# Patient Record
Sex: MALE | Race: WHITE | Employment: OTHER | URBAN - METROPOLITAN AREA
[De-identification: names, ages, dates, MRNs, and addresses within clinical notes are randomized per-mention and may not be internally consistent; named-entity substitution may affect disease eponyms.]

---

## 2020-12-09 ENCOUNTER — APPOINTMENT (OUTPATIENT)
Dept: CT IMAGING | Age: 75
DRG: 025 | End: 2020-12-09
Payer: MEDICARE

## 2020-12-09 ENCOUNTER — APPOINTMENT (OUTPATIENT)
Dept: MRI IMAGING | Age: 75
DRG: 025 | End: 2020-12-09
Payer: MEDICARE

## 2020-12-09 ENCOUNTER — HOSPITAL ENCOUNTER (INPATIENT)
Age: 75
LOS: 19 days | Discharge: NURSING, REHAB OR LONG-TERM CARE | DRG: 025 | End: 2020-12-28
Attending: EMERGENCY MEDICINE | Admitting: PSYCHIATRY & NEUROLOGY
Payer: MEDICARE

## 2020-12-09 ENCOUNTER — APPOINTMENT (OUTPATIENT)
Dept: GENERAL RADIOLOGY | Age: 75
DRG: 025 | End: 2020-12-09
Payer: MEDICARE

## 2020-12-09 PROBLEM — G93.89 BRAIN MASS: Status: ACTIVE | Noted: 2020-12-09

## 2020-12-09 LAB
% CKMB: 5.8 % (ref 0–3.5)
-: ABNORMAL
ABSOLUTE EOS #: <0.03 K/UL (ref 0–0.44)
ABSOLUTE IMMATURE GRANULOCYTE: <0.03 K/UL (ref 0–0.3)
ABSOLUTE LYMPH #: 0.98 K/UL (ref 1.1–3.7)
ABSOLUTE MONO #: 0.39 K/UL (ref 0.1–1.2)
ALBUMIN SERPL-MCNC: 3.9 G/DL (ref 3.5–5.2)
ALBUMIN/GLOBULIN RATIO: 1.3 (ref 1–2.5)
ALLEN TEST: ABNORMAL
ALP BLD-CCNC: 48 U/L (ref 40–129)
ALT SERPL-CCNC: 28 U/L (ref 5–41)
AMMONIA: 15 UMOL/L (ref 16–60)
AMORPHOUS: ABNORMAL
ANION GAP SERPL CALCULATED.3IONS-SCNC: 10 MMOL/L (ref 9–17)
ANION GAP: 10 MMOL/L (ref 7–16)
AST SERPL-CCNC: 23 U/L
BACTERIA: ABNORMAL
BASOPHILS # BLD: 0 % (ref 0–2)
BASOPHILS ABSOLUTE: <0.03 K/UL (ref 0–0.2)
BILIRUB SERPL-MCNC: 0.64 MG/DL (ref 0.3–1.2)
BILIRUBIN DIRECT: 0.18 MG/DL
BILIRUBIN URINE: NEGATIVE
BILIRUBIN, INDIRECT: 0.46 MG/DL (ref 0–1)
BUN BLDV-MCNC: 19 MG/DL (ref 8–23)
BUN/CREAT BLD: ABNORMAL (ref 9–20)
CALCIUM SERPL-MCNC: 9.5 MG/DL (ref 8.6–10.4)
CASTS UA: ABNORMAL /LPF (ref 0–8)
CHLORIDE BLD-SCNC: 98 MMOL/L (ref 98–107)
CHOLESTEROL, FASTING: 110 MG/DL
CHOLESTEROL/HDL RATIO: 3
CK MB: 9.7 NG/ML
CKMB INTERPRETATION: ABNORMAL
CO2: 25 MMOL/L (ref 20–31)
COLOR: YELLOW
CREAT SERPL-MCNC: 0.93 MG/DL (ref 0.7–1.2)
CRYSTALS, UA: ABNORMAL /HPF
DIFFERENTIAL TYPE: ABNORMAL
EOSINOPHILS RELATIVE PERCENT: 0 % (ref 1–4)
EPITHELIAL CELLS UA: ABNORMAL /HPF (ref 0–5)
FIO2: ABNORMAL
FOLATE: >20 NG/ML
GFR AFRICAN AMERICAN: >60 ML/MIN
GFR NON-AFRICAN AMERICAN: 59 ML/MIN
GFR NON-AFRICAN AMERICAN: >60 ML/MIN
GFR SERPL CREATININE-BSD FRML MDRD: >60 ML/MIN
GFR SERPL CREATININE-BSD FRML MDRD: ABNORMAL ML/MIN/{1.73_M2}
GLOBULIN: NORMAL G/DL (ref 1.5–3.8)
GLUCOSE BLD-MCNC: 113 MG/DL (ref 70–99)
GLUCOSE BLD-MCNC: 119 MG/DL (ref 74–100)
GLUCOSE URINE: NEGATIVE
HCO3 VENOUS: 29.8 MMOL/L (ref 22–29)
HCT VFR BLD CALC: 50.5 % (ref 40.7–50.3)
HDLC SERPL-MCNC: 37 MG/DL
HEMOGLOBIN: 16.8 G/DL (ref 13–17)
IMMATURE GRANULOCYTES: 0 %
INR BLD: 1
KETONES, URINE: ABNORMAL
LDL CHOLESTEROL: 52 MG/DL (ref 0–130)
LEUKOCYTE ESTERASE, URINE: NEGATIVE
LYMPHOCYTES # BLD: 18 % (ref 24–43)
MCH RBC QN AUTO: 31.1 PG (ref 25.2–33.5)
MCHC RBC AUTO-ENTMCNC: 33.3 G/DL (ref 28.4–34.8)
MCV RBC AUTO: 93.5 FL (ref 82.6–102.9)
MODE: ABNORMAL
MONOCYTES # BLD: 7 % (ref 3–12)
MUCUS: ABNORMAL
MYOGLOBIN: 111 NG/ML (ref 28–72)
NEGATIVE BASE EXCESS, VEN: ABNORMAL (ref 0–2)
NITRITE, URINE: NEGATIVE
NRBC AUTOMATED: 0 PER 100 WBC
O2 DEVICE/FLOW/%: ABNORMAL
O2 SAT, VEN: 54 % (ref 60–85)
OTHER OBSERVATIONS UA: ABNORMAL
PARTIAL THROMBOPLASTIN TIME: 25.4 SEC (ref 20.5–30.5)
PATIENT TEMP: ABNORMAL
PCO2, VEN: 48.6 MM HG (ref 41–51)
PDW BLD-RTO: 13 % (ref 11.8–14.4)
PH UA: 7.5 (ref 5–8)
PH VENOUS: 7.4 (ref 7.32–7.43)
PLATELET # BLD: 180 K/UL (ref 138–453)
PLATELET ESTIMATE: ABNORMAL
PMV BLD AUTO: 10.2 FL (ref 8.1–13.5)
PO2, VEN: 28.8 MM HG (ref 30–50)
POC CHLORIDE: 97 MMOL/L (ref 98–107)
POC CREATININE: 1.19 MG/DL (ref 0.51–1.19)
POC HEMATOCRIT: 50 % (ref 41–53)
POC HEMOGLOBIN: 16.9 G/DL (ref 13.5–17.5)
POC IONIZED CALCIUM: 1.18 MMOL/L (ref 1.15–1.33)
POC LACTIC ACID: 1.35 MMOL/L (ref 0.56–1.39)
POC PCO2 TEMP: ABNORMAL MM HG
POC PH TEMP: ABNORMAL
POC PO2 TEMP: ABNORMAL MM HG
POC POTASSIUM: 4.2 MMOL/L (ref 3.5–4.5)
POC SODIUM: 137 MMOL/L (ref 138–146)
POSITIVE BASE EXCESS, VEN: 4 (ref 0–3)
POTASSIUM SERPL-SCNC: 4.1 MMOL/L (ref 3.7–5.3)
PROTEIN UA: NEGATIVE
PROTHROMBIN TIME: 10.7 SEC (ref 9–12)
RBC # BLD: 5.4 M/UL (ref 4.21–5.77)
RBC # BLD: ABNORMAL 10*6/UL
RBC UA: ABNORMAL /HPF (ref 0–4)
RENAL EPITHELIAL, UA: ABNORMAL /HPF
SAMPLE SITE: ABNORMAL
SARS-COV-2, RAPID: NOT DETECTED
SARS-COV-2: NORMAL
SARS-COV-2: NORMAL
SEG NEUTROPHILS: 75 % (ref 36–65)
SEGMENTED NEUTROPHILS ABSOLUTE COUNT: 4.18 K/UL (ref 1.5–8.1)
SODIUM BLD-SCNC: 133 MMOL/L (ref 135–144)
SOURCE: NORMAL
SPECIFIC GRAVITY UA: 1.06 (ref 1–1.03)
TOTAL CK: 168 U/L (ref 39–308)
TOTAL CO2, VENOUS: 31 MMOL/L (ref 23–30)
TOTAL PROTEIN: 6.9 G/DL (ref 6.4–8.3)
TRICHOMONAS: ABNORMAL
TRIGLYCERIDE, FASTING: 107 MG/DL
TROPONIN INTERP: ABNORMAL
TROPONIN INTERP: ABNORMAL
TROPONIN T: ABNORMAL NG/ML
TROPONIN T: ABNORMAL NG/ML
TROPONIN, HIGH SENSITIVITY: 31 NG/L (ref 0–22)
TROPONIN, HIGH SENSITIVITY: 34 NG/L (ref 0–22)
TSH SERPL DL<=0.05 MIU/L-ACNC: 2.76 MIU/L (ref 0.3–5)
TURBIDITY: CLEAR
URINE HGB: NEGATIVE
UROBILINOGEN, URINE: NORMAL
VITAMIN B-12: 1113 PG/ML (ref 232–1245)
VLDLC SERPL CALC-MCNC: ABNORMAL MG/DL (ref 1–30)
WBC # BLD: 5.6 K/UL (ref 3.5–11.3)
WBC # BLD: ABNORMAL 10*3/UL
WBC UA: ABNORMAL /HPF (ref 0–5)
YEAST: ABNORMAL

## 2020-12-09 PROCEDURE — 74176 CT ABD & PELVIS W/O CONTRAST: CPT

## 2020-12-09 PROCEDURE — 82435 ASSAY OF BLOOD CHLORIDE: CPT

## 2020-12-09 PROCEDURE — 84132 ASSAY OF SERUM POTASSIUM: CPT

## 2020-12-09 PROCEDURE — 84443 ASSAY THYROID STIM HORMONE: CPT

## 2020-12-09 PROCEDURE — 85730 THROMBOPLASTIN TIME PARTIAL: CPT

## 2020-12-09 PROCEDURE — 6370000000 HC RX 637 (ALT 250 FOR IP): Performed by: PSYCHIATRY & NEUROLOGY

## 2020-12-09 PROCEDURE — 82746 ASSAY OF FOLIC ACID SERUM: CPT

## 2020-12-09 PROCEDURE — 6360000004 HC RX CONTRAST MEDICATION: Performed by: EMERGENCY MEDICINE

## 2020-12-09 PROCEDURE — 85610 PROTHROMBIN TIME: CPT

## 2020-12-09 PROCEDURE — 3209999900 CT THORACIC SPINE TRAUMA RECONSTRUCTION

## 2020-12-09 PROCEDURE — 96366 THER/PROPH/DIAG IV INF ADDON: CPT

## 2020-12-09 PROCEDURE — 81001 URINALYSIS AUTO W/SCOPE: CPT

## 2020-12-09 PROCEDURE — 99285 EMERGENCY DEPT VISIT HI MDM: CPT

## 2020-12-09 PROCEDURE — 2000000003 HC NEURO ICU R&B

## 2020-12-09 PROCEDURE — 6370000000 HC RX 637 (ALT 250 FOR IP): Performed by: STUDENT IN AN ORGANIZED HEALTH CARE EDUCATION/TRAINING PROGRAM

## 2020-12-09 PROCEDURE — 0042T CT BRAIN PERFUSION: CPT

## 2020-12-09 PROCEDURE — 87086 URINE CULTURE/COLONY COUNT: CPT

## 2020-12-09 PROCEDURE — 6360000004 HC RX CONTRAST MEDICATION: Performed by: PSYCHIATRY & NEUROLOGY

## 2020-12-09 PROCEDURE — 83605 ASSAY OF LACTIC ACID: CPT

## 2020-12-09 PROCEDURE — 70496 CT ANGIOGRAPHY HEAD: CPT

## 2020-12-09 PROCEDURE — 80048 BASIC METABOLIC PNL TOTAL CA: CPT

## 2020-12-09 PROCEDURE — 6360000004 HC RX CONTRAST MEDICATION: Performed by: FAMILY MEDICINE

## 2020-12-09 PROCEDURE — 80076 HEPATIC FUNCTION PANEL: CPT

## 2020-12-09 PROCEDURE — 85014 HEMATOCRIT: CPT

## 2020-12-09 PROCEDURE — 2500000003 HC RX 250 WO HCPCS: Performed by: STUDENT IN AN ORGANIZED HEALTH CARE EDUCATION/TRAINING PROGRAM

## 2020-12-09 PROCEDURE — 85025 COMPLETE CBC W/AUTO DIFF WBC: CPT

## 2020-12-09 PROCEDURE — 70450 CT HEAD/BRAIN W/O DYE: CPT

## 2020-12-09 PROCEDURE — 70553 MRI BRAIN STEM W/O & W/DYE: CPT

## 2020-12-09 PROCEDURE — 2580000003 HC RX 258: Performed by: STUDENT IN AN ORGANIZED HEALTH CARE EDUCATION/TRAINING PROGRAM

## 2020-12-09 PROCEDURE — 84484 ASSAY OF TROPONIN QUANT: CPT

## 2020-12-09 PROCEDURE — A9579 GAD-BASE MR CONTRAST NOS,1ML: HCPCS | Performed by: FAMILY MEDICINE

## 2020-12-09 PROCEDURE — 83874 ASSAY OF MYOGLOBIN: CPT

## 2020-12-09 PROCEDURE — 82550 ASSAY OF CK (CPK): CPT

## 2020-12-09 PROCEDURE — 82330 ASSAY OF CALCIUM: CPT

## 2020-12-09 PROCEDURE — 6360000002 HC RX W HCPCS: Performed by: FAMILY MEDICINE

## 2020-12-09 PROCEDURE — 99291 CRITICAL CARE FIRST HOUR: CPT

## 2020-12-09 PROCEDURE — U0002 COVID-19 LAB TEST NON-CDC: HCPCS

## 2020-12-09 PROCEDURE — 36415 COLL VENOUS BLD VENIPUNCTURE: CPT

## 2020-12-09 PROCEDURE — 82140 ASSAY OF AMMONIA: CPT

## 2020-12-09 PROCEDURE — 82803 BLOOD GASES ANY COMBINATION: CPT

## 2020-12-09 PROCEDURE — 82607 VITAMIN B-12: CPT

## 2020-12-09 PROCEDURE — 2580000003 HC RX 258: Performed by: FAMILY MEDICINE

## 2020-12-09 PROCEDURE — 95819 EEG AWAKE AND ASLEEP: CPT

## 2020-12-09 PROCEDURE — 71045 X-RAY EXAM CHEST 1 VIEW: CPT

## 2020-12-09 PROCEDURE — 82553 CREATINE MB FRACTION: CPT

## 2020-12-09 PROCEDURE — 96365 THER/PROPH/DIAG IV INF INIT: CPT

## 2020-12-09 PROCEDURE — 3209999900 CT LUMBAR SPINE TRAUMA RECONSTRUCTION

## 2020-12-09 PROCEDURE — 82565 ASSAY OF CREATININE: CPT

## 2020-12-09 PROCEDURE — 84295 ASSAY OF SERUM SODIUM: CPT

## 2020-12-09 PROCEDURE — 82947 ASSAY GLUCOSE BLOOD QUANT: CPT

## 2020-12-09 PROCEDURE — 72125 CT NECK SPINE W/O DYE: CPT

## 2020-12-09 PROCEDURE — 80061 LIPID PANEL: CPT

## 2020-12-09 PROCEDURE — 99222 1ST HOSP IP/OBS MODERATE 55: CPT | Performed by: PSYCHIATRY & NEUROLOGY

## 2020-12-09 PROCEDURE — 93005 ELECTROCARDIOGRAM TRACING: CPT | Performed by: EMERGENCY MEDICINE

## 2020-12-09 RX ORDER — ATORVASTATIN CALCIUM 40 MG/1
40 TABLET, FILM COATED ORAL NIGHTLY
Status: DISCONTINUED | OUTPATIENT
Start: 2020-12-09 | End: 2020-12-28 | Stop reason: HOSPADM

## 2020-12-09 RX ORDER — ASPIRIN 300 MG/1
300 SUPPOSITORY RECTAL ONCE
Status: COMPLETED | OUTPATIENT
Start: 2020-12-09 | End: 2020-12-09

## 2020-12-09 RX ORDER — LEVETIRACETAM 10 MG/ML
1000 INJECTION INTRAVASCULAR ONCE
Status: COMPLETED | OUTPATIENT
Start: 2020-12-09 | End: 2020-12-09

## 2020-12-09 RX ORDER — LEVETIRACETAM 5 MG/ML
500 INJECTION INTRAVASCULAR EVERY 12 HOURS
Status: DISCONTINUED | OUTPATIENT
Start: 2020-12-09 | End: 2020-12-16

## 2020-12-09 RX ORDER — SODIUM CHLORIDE 0.9 % (FLUSH) 0.9 %
10 SYRINGE (ML) INJECTION PRN
Status: DISCONTINUED | OUTPATIENT
Start: 2020-12-09 | End: 2020-12-28 | Stop reason: HOSPADM

## 2020-12-09 RX ORDER — SODIUM CHLORIDE 9 MG/ML
INJECTION, SOLUTION INTRAVENOUS CONTINUOUS
Status: DISCONTINUED | OUTPATIENT
Start: 2020-12-09 | End: 2020-12-16

## 2020-12-09 RX ORDER — LABETALOL HYDROCHLORIDE 5 MG/ML
10 INJECTION, SOLUTION INTRAVENOUS EVERY 10 MIN PRN
Status: DISCONTINUED | OUTPATIENT
Start: 2020-12-09 | End: 2020-12-14

## 2020-12-09 RX ADMIN — IOPAMIDOL 50 ML: 755 INJECTION, SOLUTION INTRAVENOUS at 15:20

## 2020-12-09 RX ADMIN — LEVETIRACETAM 1000 MG: 10 INJECTION INTRAVENOUS at 14:04

## 2020-12-09 RX ADMIN — GADOTERIDOL 18 ML: 279.3 INJECTION, SOLUTION INTRAVENOUS at 18:38

## 2020-12-09 RX ADMIN — IOPAMIDOL 90 ML: 755 INJECTION, SOLUTION INTRAVENOUS at 13:47

## 2020-12-09 RX ADMIN — Medication 10 ML: at 20:49

## 2020-12-09 RX ADMIN — SODIUM CHLORIDE: 9 INJECTION, SOLUTION INTRAVENOUS at 20:51

## 2020-12-09 RX ADMIN — FAMOTIDINE 20 MG: 10 INJECTION INTRAVENOUS at 20:49

## 2020-12-09 RX ADMIN — DESMOPRESSIN ACETATE 40 MG: 0.2 TABLET ORAL at 20:49

## 2020-12-09 RX ADMIN — ASPIRIN 300 MG: 300 SUPPOSITORY RECTAL at 14:05

## 2020-12-09 ASSESSMENT — PAIN SCALES - WONG BAKER: WONGBAKER_NUMERICALRESPONSE: 2

## 2020-12-09 ASSESSMENT — PAIN SCALES - GENERAL
PAINLEVEL_OUTOF10: 0
PAINLEVEL_OUTOF10: 0

## 2020-12-09 NOTE — CONSULTS
Department of Endovascular Neurosurgery                                         Resident Consult Note    Reason for Consult: unresponsiveness,fall,confusion,dysarthria  Requesting Physician:  Dr. Tiffany Pruett:   []Dr. April Medrano  [x]Dr. Leesa Foss  []Dr. Sharan Collet  []Dr. Rajat Duarte  []     History Obtained From:  family member - daughter, electronic medical record, staff    CHIEF COMPLAINT:        unresponsiveness,fall,confusion,dysarthria    HISTORY OF PRESENT ILLNESS:       The patient is a 66 y.o. male who presents with  Unresponsiveness,fall,confusion,dysarthria. According to the daughter patient posted via post on [employer] that was not making any sense and felt like it was not like his usual self. This morning patient called a friend telling them he had fallen and was noticed to have slurred speech. EMS was called and patient was brought to the hospital.  Per daughter patient did not have any significant past medical history  Not on any AC/AP at home  No known  history of stroke versus seizure versus tumor in the past  GCS E4, V1, M1  Patient was blinking his eyes in the ER and was noted to move right upper extremity. Patient was not responding, no gaze deviation was noted patient was also noticed to have left facial droop. BP in the   Glucose 119  Creatinine 1.19    nih 22     PAST MEDICAL HISTORY :       Past Medical History:    History reviewed. No pertinent past medical history. Past Surgical History:    History reviewed. No pertinent surgical history.     Social History:   Social History     Socioeconomic History    Marital status: Not on file     Spouse name: Not on file    Number of children: Not on file    Years of education: Not on file    Highest education level: Not on file   Occupational History    Not on file   Social Needs    Financial resource strain: Not on file    Food insecurity     Worry: Not on file     Inability: Not on file    Transportation needs     Medical: Not on file     Non-medical: Not on file   Tobacco Use    Smoking status: Unknown If Ever Smoked   Substance and Sexual Activity    Alcohol use: Not on file     Comment: SAUNDRA     Drug use: Not on file    Sexual activity: Not on file     Comment: SAUNDRA   Lifestyle    Physical activity     Days per week: Not on file     Minutes per session: Not on file    Stress: Not on file   Relationships    Social connections     Talks on phone: Not on file     Gets together: Not on file     Attends Hinduism service: Not on file     Active member of club or organization: Not on file     Attends meetings of clubs or organizations: Not on file     Relationship status: Not on file    Intimate partner violence     Fear of current or ex partner: Not on file     Emotionally abused: Not on file     Physically abused: Not on file     Forced sexual activity: Not on file   Other Topics Concern    Not on file   Social History Narrative    Not on file       Family History:   History reviewed. No pertinent family history. Allergies:  Patient has no allergy information on record. Home Medications:  Prior to Admission medications    Not on File       Current Medications:   No current facility-administered medications for this encounter. REVIEW OF SYSTEMS:       CONSTITUTIONAL: negative for fatigue and malaise   EYES: negative for double vision and photophobia    HEENT: negative for tinnitus and sore throat   RESPIRATORY: negative for cough, shortness of breath   CARDIOVASCULAR: negative for chest pain, palpitations   GASTROINTESTINAL: negative for nausea, vomiting   GENITOURINARY: negative for incontinence   MUSCULOSKELETAL: negative for neck or back pain   NEUROLOGICAL: negative for seizures   PSYCHIATRIC: negative for fatigue     Review of systems otherwise negative.     PHYSICAL EXAM:       BP (!) 181/96   Pulse 84   Temp 98.6 °F (37 °C) (Axillary)   Resp 18   Ht 6' 2\" (1.88 m) Wt 200 lb (90.7 kg)   SpO2 100%   BMI 25.68 kg/m²     CONSTITUTIONAL:  Well developed, well nourished, GCS E4,V1,M1   Blinking eyes, was moving right upper extremity, not responding, no murmur upon noxious stimulation in the extremities, no gaze deviation pupils equal round and reactive  Patient was moaning with left lower extremity noxious stimulus  Tone present in all extremities     HEAD:  normocephalic, atraumatic    EYES:  PERRLA, EOMI.   ENT:  moist mucous membranes   NECK:  supple, symmetric, no midline tenderness to palpation    BACK:  without midline tenderness, step-offs or deformities    LUNGS:  Equal air entry bilaterally   CARDIOVASCULAR:  normal s1 / s2   ABDOMEN:  Soft, no rigidity   NEUROLOGIC:  Well developed, well nourished, GCS E4,V1,M1   Blinking eyes, was moving right upper extremity, not responding, no murmur upon noxious stimulation in the extremities, no gaze deviation pupils equal round and reactive  Patient was moaning with left lower extremity noxious stimulus  Tone present in all extremities      INITIAL NIH STROKE SCALE:    Time Performed:  1:30 PM     1a. Level of consciousness:  2 - not alert, requires repeated stimulation to attend, or is obtunded and requires strong or painful stimulation to make movements (not stereotyped)   1b. Level of consciousness questions:  2 - answers neither question correctly  1c. Level of consciousness questions:  2 - performs neither task correctly  2. Best Gaze:  0 - normal  3. Visual:  0 - no visual loss  4. Facial Palsy:  1 - minor paralysis (flattened nasolabial fold, asymmetric on smiling)  5a. Motor left arm:  3 - no effort against gravity, limb falls  5b. Motor right arm:  1 - drift, limb holds 90 (or 45) degrees but drifts down before full 10 seconds: does not hit bed  6a. Motor left leg:  3 - no effort against gravity; leg falls to bed immediately  6b.   Motor right leg:  3 - no effort against gravity; leg falls to bed immediately  7. Limb Ataxia: unable to be examined,patient not following commands  8. Sensory:  1 - mild to moderate sensory loss; patient feels pinprick is less sharp or is dull on the affected side; there is a loss of superficial pain with pinprick but patient is aware of being touched   9. Best Language:  3 - mute, global aphasia; no usable speech or auditory comprehension  10. Dysarthria:  UN - intubated or other physical barrier  11. Extinction and Inattention: unable to be examined,patient not following commands    TOTAL:  20     SKIN:  no rash      LABS AND IMAGING:     CBC with Differential:    Lab Results   Component Value Date    WBC 5.6 12/09/2020    RBC 5.40 12/09/2020    HGB 16.8 12/09/2020    HCT 50.5 12/09/2020     12/09/2020    MCV 93.5 12/09/2020    MCH 31.1 12/09/2020    MCHC 33.3 12/09/2020    RDW 13.0 12/09/2020    LYMPHOPCT 18 12/09/2020    MONOPCT 7 12/09/2020    BASOPCT 0 12/09/2020    MONOSABS 0.39 12/09/2020    LYMPHSABS 0.98 12/09/2020    EOSABS <0.03 12/09/2020    BASOSABS <0.03 12/09/2020    DIFFTYPE NOT REPORTED 12/09/2020     BMP:    Lab Results   Component Value Date    NA PENDING 12/09/2020    K PENDING 12/09/2020    CL PENDING 12/09/2020    CO2 PENDING 12/09/2020    BUN PENDING 12/09/2020    CREATININE PENDING 12/09/2020    CALCIUM PENDING 12/09/2020    GFRAA PENDING 12/09/2020    LABGLOM PENDING 12/09/2020    GLUCOSE PENDING 12/09/2020       Radiology Review:  As above      ASSESSMENT AND PLAN:     There is no problem list on file for this patient. 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 Tova Rojas : 3 cm left posterior temporal lobe mass with surrounding vasogenic edema.   Minimal midline shift from left to right. 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 - CTA H/N: Unremarkable  - CT Brain Perfusion   - MRI Brain w WO to rule out intracranial structural abnormality             -EEG to rule out seizures              -Loaded with with Keppra 1 g in the ER              -will Start Keppra 500 mg twice daily   - ECHO   - ASA 81mg now then daily   - Folic acid 1mg BID   -lipitor 40mg nightly    - Fasting Lipid panel   - PT, OT, Speech eval    - Hydrate with  IVF NS @ 75cc/hr    - Telemetry    - Neuro checks per protocol  - We recommend SBP normotension  - Blood glucose goal less than 180  - Please avoid dextrose containing solutions    Additional recommendations may follow    Please contact [facility] with any changes in patients neurologic status. Thank you for your consult.        Ivet Acuna MD   12/9/2020  2:25 PM

## 2020-12-09 NOTE — ED NOTES
Report to [facility]. Pt ok to go to unit from MRI.        Maye English RN  12/09/20 2135

## 2020-12-09 NOTE — ED TRIAGE NOTES
Pt arrived via life flight for stroke like symptoms. Via report pt made a phone call yesterday and speech was slurred. Today pt made a call to a neighbor and they were unable to articulate what the pt was saying. Pt is nonverbal and only responds to right lower extremity pain. Pt is able to blink eyes at this time. Daughter called and stated pt was a DNR.

## 2020-12-09 NOTE — H&P
Neuro ICU History & Physical    Patient Name: Jonnathan Huertas  Patient : 1945  Room/Bed:   Code Status: Full code  Allergies: Not on File    CHIEF COMPLAINT     Altered mental status, weakness, dysarthria     HPI    History Obtained From: EMR and pt. The patient is a 66 y.o. male who presented to the emergency department for altered mental status. Supposedly making Facebook posts last night that did not make sense. Daughter was concerned and called her sister who lives closer to patient. Neighbor spoke to patient on the phone this afternoon and noted garbled speech and the patient suddenly stopped talking. EMS was called. Found patient altered with decreased responsiveness. Also concern for left-sided facial droop. Transported via [facility] with concern for stroke. Vital stable in route with exception of mild hypertension. Glucose within normal limits. Stroke alert called upon arrival.  Unclear last known well. GCS between 8 and 10 upon arrival; protecting airway. Initial NIH per stroke note of 20 for the following deficits: level of consciousness, facial palsy, left arm drift, weakness in lower extremities bilaterally, sensory loss, aphasia. Was taken immediately for CT head which demonstrated left posterior temporal lobe mass of approximately 3 cm with surrounding vasogenic edema and minimal midline shift. Neurosurgery team consulted. Plan for MRI with and without contrast.  Hold steroids at this time. Concern for possible seizure-like activity was loaded with 1 g of Keppra. Labs significant for mild hyponatremia 133 as well as mild elevation in myoglobin at 111. Also had mild troponin elevation but repeat troponin trending downward. Family contacted by ER resident. Only known medical problems include hypertension and possible dementia per ED note. No additional medical information on our records or in Care Everywhere. Upon exam patient was able to state his name. Heydimargo Camarena gave mother contact number for Dr. Don Serna but again there was in the packet I think no is getting e mumbling incomprehensible sounds. Intermittently following commands. Pupils equal round reactive to light. Extraocular movements intact. Mild left-sided facial droop. Able to hold both arms out in front of him with no obvious pronator drift. No spontaneous movements of lower extremities but retracting to pain. Admitted to ICU From: ED  Reason for ICU Admission: Altered mental status, brain mass       PATIENT HISTORY   Past Medical History:    History reviewed. No pertinent past medical history. Past Surgical History:    History reviewed. No pertinent surgical history. Social History:   Social History     Socioeconomic History    Marital status:       Spouse name: Not on file    Number of children: Not on file    Years of education: Not on file    Highest education level: Not on file   Occupational History    Not on file   Social Needs    Financial resource strain: Not on file    Food insecurity     Worry: Not on file     Inability: Not on file    Transportation needs     Medical: Not on file     Non-medical: Not on file   Tobacco Use    Smoking status: Unknown If Ever Smoked   Substance and Sexual Activity    Alcohol use: Not on file     Comment: SAUNDRA     Drug use: Not on file    Sexual activity: Not on file     Comment: SAUNDRA   Lifestyle    Physical activity     Days per week: Not on file     Minutes per session: Not on file    Stress: Not on file   Relationships    Social connections     Talks on phone: Not on file     Gets together: Not on file     Attends Shinto service: Not on file     Active member of club or organization: Not on file     Attends meetings of clubs or organizations: Not on file     Relationship status: Not on file    Intimate partner violence     Fear of current or ex partner: Not on file     Emotionally abused: Not on file     Physically abused: Not on file     Forced sexual activity: Not on file   Other Topics Concern    Not on file   Social History Narrative    Not on file       Family History:   History reviewed. No pertinent family history. Allergies:    Patient has no allergy information on record. Medications Prior to Admission:    Not in a hospital admission. Current Medications:  No current facility-administered medications for this encounter. REVIEW OF SYSTEMS     ROS unobtainable due to clinical condition     PHYSICAL EXAM:     BP (!) 143/91   Pulse 75   Temp 98.6 °F (37 °C) (Axillary)   Resp 12   Ht 6' 2\" (1.88 m)   Wt 200 lb (90.7 kg)   SpO2 99%   BMI 25.68 kg/m²     PHYSICAL EXAM:  CONSTITUTIONAL:  Alert and oriented x 1, in no acute distress. GCS 10. Nontoxic. Mixed aphasia. Mild dysarthria   HEAD:  normocephalic, atraumatic    EYES:  PERRLA, EOMI.   ENT:  moist mucous membranes   LUNGS:  Equal air entry bilaterally   CARDIOVASCULAR:  normal s1 / s2   ABDOMEN:  Tenderness to suprapubic region, moderate. No rebound. Abdomen is soft and nondistended   NECK supple, symmetric, no midline tenderness to palpation. c-collar in place   BACK without midline tenderness, step-offs or deformities    EXTREMITIES No deformity or evidence of acute injury to extreities   NEUROLOGIC:  Mental Status:  A & O x1 (to self), drowsy but wakes to verbal stimuli             Cranial Nerves:    II: Visual acuity:  Responds to visual threat  III: Pupils:  equal, round, reactive to light  III,IV,VI: Extra Ocular Movements: intact  VII: Facial strength: abnormal mild left-sided facial droop    Motor Exam:    Drift: Mild drift bilaterally R>L; will hold both arms up for > 5 seconds with repeated prompting  Tone:  normal    Bilateral UE 4/5 strength  Bilateral LE antigravity     Sensory:    Touch:     Withdraws all extremities to pain    Deep Tendon Reflexes:    Right Knee:  1+  Left Knee:  1+    Plantar Response:  Right:  downgoing  Left: downgoing    Clonus:  absent     SKIN No obvious ecchymosis, rashes, or lesions      LABS AND IMAGING:     RECENT LABS:  CBC with Differential:    Lab Results   Component Value Date    WBC 5.6 12/09/2020    RBC 5.40 12/09/2020    HGB 16.8 12/09/2020    HCT 50.5 12/09/2020     12/09/2020    MCV 93.5 12/09/2020    MCH 31.1 12/09/2020    MCHC 33.3 12/09/2020    RDW 13.0 12/09/2020    LYMPHOPCT 18 12/09/2020    MONOPCT 7 12/09/2020    BASOPCT 0 12/09/2020    MONOSABS 0.39 12/09/2020    LYMPHSABS 0.98 12/09/2020    EOSABS <0.03 12/09/2020    BASOSABS <0.03 12/09/2020    DIFFTYPE NOT REPORTED 12/09/2020     BMP:    Lab Results   Component Value Date     12/09/2020    K 4.1 12/09/2020    CL 98 12/09/2020    CO2 25 12/09/2020    BUN 19 12/09/2020    CREATININE 0.93 12/09/2020    CALCIUM 9.5 12/09/2020    GFRAA >60 12/09/2020    LABGLOM >60 12/09/2020    GLUCOSE 113 12/09/2020       RADIOLOGY:     CT CHEST ABDOMEN PELVIS WO CONTRAST   Final Result   No acute disease. Incidental findings as above. CT LUMBAR SPINE TRAUMA RECONSTRUCTION   Preliminary Result   CT thoracic spine: Degenerative and degenerative disc disease. No acute   fracture or traumatic malalignment. CT lumbar spine: No acute fracture. Patient has an anterolisthesis L5 upon   S1 and bilateral chronic spondylosis. Multilevel degenerative and   degenerative disc changes are noted as above. Multilevel disc bulges. Exiting L5-S1 nerve roots are elevated and borderline impinged. CT THORACIC SPINE TRAUMA RECONSTRUCTION   Preliminary Result   CT thoracic spine: Degenerative and degenerative disc disease. No acute   fracture or traumatic malalignment. CT lumbar spine: No acute fracture. Patient has an anterolisthesis L5 upon   S1 and bilateral chronic spondylosis. Multilevel degenerative and   degenerative disc changes are noted as above. Multilevel disc bulges.    Exiting L5-S1 nerve roots are elevated and borderline attending, will reevaluate patient along with attending. PLAN/MEDICAL DECISION MAKING:      NEUROLOGIC:  - CT head: left posterior temporal lobe bass approximately 3cm in diameter with surrounding vasogenic edema and minimal midline shift.  - AEDs: loaded with 1g keppra in ED; will continue keppra 500mg BID   - LTME  - Altered mental status of unclear etiology   - unclear last known well   - Brain mass on CT; concern for metastatic disease vs lymphoma; hold steroids at this time   - MRI with and without pending   - additional labs pending to rule out encephalopathy (B12, folate, ammonia, TSH)  - Goal SBP <160  - Neuro checks per protocol    CARDIOVASCULAR:  - Goal SBP <160  - Hx of HTN per family but unclear of home meds  - echocardiogram tomorrow  - Continue telemetry    PULMONARY:  - No respiratory distress; protectin airway  - CXR unremarkable  - CT chest/abd/pelvis without to evaluate for primary CA    RENAL/FLUID/ELECTROLYTE:  - BUN 19/ Creatinine 0.93  - normal kidney function  - Monitor I/Os  - IVF: 100cc/h  - mild hyponatremia 1.33; likely dehydration   - will monitor for evidence SIADH  - Replace electrolytes PRN  - Daily BMP    GI/NUTRITION:  NUTRITION:  No diet orders on file  - Bowel regimen: Glycolax PRN  - GI prophylaxis: Pepcid BID  - f/u ammonia and LFTs  - CT Abd/pelvis without contrast pending    ID:  - Afebrile in ED.  37 degrees celsius   - CXR normal  - no leukocytosis  - UA/Urine culture pending  - covid negative (12/9/20)  - Continue to monitor for fevers  - Daily CBC    HEME:   - H&H 16.8/50.5; possible hemoconcentration secondary to dehydration  - Platelets 029  - Daily CBC    ENDOCRINE:  - Continue to monitor blood glucose, goal <180  - Monitor glucose; no known hx of DM  - TSH pending    OTHER:  - PT/OT/ST     PROPHYLAXIS:  Stress ulcer: H2 blocker    DVT PROPHYLAXIS:  - SCD sleeves - Thigh High   - Hold AC and AP at this time    DISPOSITION: admit to neuro ICU      Deepika Britton DO  Neuro Critical Care Service   Pager [phone]  12/9/2020     5:26 PM

## 2020-12-09 NOTE — ED NOTES
Returned from [facility]. Pt coughs up CT and is able to clear sputum to lips. Secretions suctioned per this writer. Pt. Is unable to answer questions with words, and mouths the words \"I can't breathe. \"  Pt nods slightly that C-collar is hurting him and follows commands such as eye opening. Pt re-oriented to date and place. Unable to complete MRI checklist at this time.      Victorio Gaucher, RN  12/09/20 0714

## 2020-12-09 NOTE — ED NOTES
Bed: 12  Expected date:   Expected time:   Means of arrival:   Comments:     Tejal Bartholomew RN  12/09/20 [id number]

## 2020-12-09 NOTE — ED PROVIDER NOTES
block  ST Segments: no acute change  T Waves: no acute change  Q Waves: no acute change    Clinical Impression:  nonspecific EKG. CRITICAL CARE: There was a high probability of clinically significant/life threatening deterioration in this patient's condition which required my urgent intervention. Total critical care time was 30 minutes. This excludes any time for separately reportable procedures. This patient was evaluated in the Emergency Department for symptoms described in the history of present illness. He/she was evaluated in the context of the global COVID-19 pandemic, which necessitated consideration that the patient might be at risk for infection with the SARS-CoV-2 virus that causes COVID-19. Institutional protocols and algorithms that pertain to the evaluation of patients at risk for COVID-19 are in a state of rapid change based on information released by regulatory bodies including the CDC and federal and state organizations. These policies and algorithms were followed during the patient's care in the ED. (Please note that portions of this note were completed with a voice recognition program. Efforts were made to edit the dictations but occasionally words are mis-transcribed.  Whenever words are used in this note in any gender, they shall be construed as though they were used in the gender appropriate to the circumstances; and whenever words are used in this note in the singular or plural form, they shall be construed as though they were used in the form appropriate to the circumstances.)    MD Maix Alicia  Attending Emergency Medicine Physician             Gab Davidson MD  12/09/20 58 John Adame MD  12/09/20 [id number]

## 2020-12-09 NOTE — ED PROVIDER NOTES
[facility] ED  Emergency Department Encounter  Emergency Medicine Resident     Pt Name: Lizeth Uribe  MRN: [id number]  [place] 1945  Date of evaluation: 12/9/20  PCP:  No primary care provider on file. CHIEF COMPLAINT       Chief Complaint   Patient presents with    Cerebrovascular Accident       HISTORY OFPRESENT ILLNESS  (Location/Symptom, Timing/Onset, Context/Setting, Quality, Duration, Modifying Factors,Severity.)      Lizeth Uribe is a 66 y.o. male who presents with altered mental status, likely stroke. Patient not speaking, apparently called a neighbor with severe dysarthria who then called EMS. Patient brought in by Brittany myers. Unknown medical history or medical problems. Patient able to hold his right arm up against gravity. Some tone on the left arm when arm was held up, it drifted slowly to the bed. Stroke team at bedside upon arrival.      PAST MEDICAL / SURGICAL / SOCIAL / FAMILY HISTORY      has no past medical history on file. has no past surgical history on file. Social History     Socioeconomic History    Marital status:       Spouse name: Not on file    Number of children: Not on file    Years of education: Not on file    Highest education level: Not on file   Occupational History    Not on file   Social Needs    Financial resource strain: Not on file    Food insecurity     Worry: Not on file     Inability: Not on file    Transportation needs     Medical: Not on file     Non-medical: Not on file   Tobacco Use    Smoking status: Unknown If Ever Smoked   Substance and Sexual Activity    Alcohol use: Not on file     Comment: SAUNDRA     Drug use: Not on file    Sexual activity: Not on file     Comment: SAUNDRA   Lifestyle    Physical activity     Days per week: Not on file     Minutes per session: Not on file    Stress: Not on file   Relationships    Social connections     Talks on phone: Not on file     Gets together: Not on file Attends Spiritism service: Not on file     Active member of club or organization: Not on file     Attends meetings of clubs or organizations: Not on file     Relationship status: Not on file    Intimate partner violence     Fear of current or ex partner: Not on file     Emotionally abused: Not on file     Physically abused: Not on file     Forced sexual activity: Not on file   Other Topics Concern    Not on file   Social History Narrative    Not on file       History reviewed. No pertinent family history. Allergies:  Patient has no allergy information on record. Home Medications:  Prior to Admission medications    Not on File       REVIEW OFSYSTEMS    (2-9 systems for level 4, 10 or more for level 5)      Review of Systems   Unable to perform ROS: Mental status change       PHYSICAL EXAM   (up to 7 for level 4, 8 or more forlevel 5)      INITIAL VITALS:   ED Triage Vitals   BP Temp Temp src Pulse Resp SpO2 Height Weight   -- -- -- -- -- -- -- --       Physical Exam  Vitals signs and nursing note reviewed. Constitutional:       Comments: Awake, unresponsive   HENT:      Head: Normocephalic. Comments: L sided facial droop     Mouth/Throat:      Mouth: Mucous membranes are moist.      Pharynx: Oropharynx is clear. Cardiovascular:      Rate and Rhythm: Normal rate and regular rhythm. Pulses: Normal pulses. Pulmonary:      Effort: Pulmonary effort is normal. No respiratory distress. Breath sounds: Normal breath sounds. Comments: Protecting own airway despite unresponsiveness  Abdominal:      General: There is no distension. Palpations: Abdomen is soft. Tenderness: There is no abdominal tenderness. Skin:     Capillary Refill: Capillary refill takes 2 to 3 seconds. Neurological:      GCS: GCS eye subscore is 4. GCS verbal subscore is 1. GCS motor subscore is 3.       Comments: Hold R arm against gravity, left arm with moderate drift, does not follow commands, pupils equal and reactive. Responds to pain only in LLE, no response to pain in UE or sternal rub. DIFFERENTIAL  DIAGNOSIS     PLAN (LABS / IMAGING / EKG):  Orders Placed This Encounter   Procedures    Culture, Urine    CT HEAD WO CONTRAST    CTA HEAD NECK W CONTRAST    CT BRAIN PERFUSION    MRI BRAIN W WO CONTRAST    XR CHEST PORTABLE    CT THORACIC SPINE TRAUMA RECONSTRUCTION    CT LUMBAR SPINE TRAUMA RECONSTRUCTION    CT CERVICAL SPINE WO CONTRAST    CT CHEST ABDOMEN PELVIS WO CONTRAST    STROKE PANEL    Hemoglobin and hematocrit, blood    SODIUM (POC)    POTASSIUM (POC)    CHLORIDE (POC)    CALCIUM, IONIC (POC)    Troponin    COVID-19    Urinalysis with Microscopic    AMMONIA    TSH with Reflex    VITAMIN B12 & FOLATE    Hepatic Function Panel    Inpatient consult to Stroke Team    Inpatient consult to Neurosurgery    Inpatient consult to Neurocritical care    Venous Blood Gas, POC    Creatinine W/GFR Point of Care    Lactic Acid, POC    POCT Glucose    Anion Gap (Calc) POC    EKG 12 Lead    EEG video monitoring    PATIENT STATUS (FROM ED OR OR/PROCEDURAL) Inpatient       MEDICATIONS ORDERED:  Orders Placed This Encounter   Medications    iopamidol (ISOVUE-370) 76 % injection 90 mL    levetiracetam (KEPPRA) 1000 mg/100 mL IVPB    aspirin suppository 300 mg    iopamidol (ISOVUE-370) 76 % injection 50 mL       DDX: Stroke, seizure, postictal    Initial MDM/Plan: 66 y.o. male who presents with altered mental status. Patient apparently called his neighbor, began to have dysarthria on the phone and the neighbor called EMS. Patient unresponsive on arrival, but protecting his own airway. Response the pain in the left lower extremity only. Pupils equal round and reactive, no gaze drift but left facial droop present. Some tone in the left upper extremity but good tone in the right upper extremity. Likely stroke given patient's symptoms.  Stroke alert called upon arrival. CT head, CTA head and neck, and stroke panel ordered in addition to stroke alert. We will continue to monitor.     DIAGNOSTIC RESULTS / EMERGENCYDEPARTMENT COURSE / MDM     LABS:  Labs Reviewed   STROKE PANEL - Abnormal; Notable for the following components:       Result Value    Glucose 113 (*)     Sodium 133 (*)     Hematocrit 50.5 (*)     % CKMB 5.8 (*)     Seg Neutrophils 75 (*)     Lymphocytes 18 (*)     Eosinophils % 0 (*)     Absolute Lymph # 0.98 (*)     Myoglobin 111 (*)     Troponin, High Sensitivity 34 (*)     All other components within normal limits   SODIUM (POC) - Abnormal; Notable for the following components:    POC Sodium 137 (*)     All other components within normal limits   CHLORIDE (POC) - Abnormal; Notable for the following components:    POC Chloride 97 (*)     All other components within normal limits   TROPONIN - Abnormal; Notable for the following components:    Troponin, High Sensitivity 31 (*)     All other components within normal limits   URINALYSIS WITH MICROSCOPIC - Abnormal; Notable for the following components:    Ketones, Urine SMALL (*)     Specific Gravity, UA 1.057 (*)     All other components within normal limits   AMMONIA - Abnormal; Notable for the following components:    Ammonia 15 (*)     All other components within normal limits   VENOUS BLOOD GAS, POINT OF CARE - Abnormal; Notable for the following components:    pO2, Matthew 28.8 (*)     HCO3, Venous 29.8 (*)     Total CO2, Venous 31 (*)     Positive Base Excess, Matthew 4 (*)     O2 Sat, Matthew 54 (*)     All other components within normal limits   CREATININE W/GFR POINT OF CARE - Abnormal; Notable for the following components:    GFR Non- 59 (*)     All other components within normal limits   POCT GLUCOSE - Abnormal; Notable for the following components:    POC Glucose 119 (*)     All other components within normal limits   CULTURE, URINE   HGB/HCT   POTASSIUM (POC)   CALCIUM, IONIC (POC)   COVID-19   TSH WITH REFLEX   VITAMIN B12 visualized skull or soft tissues. CTA NECK: AORTIC ARCH/ARCH VESSELS: No dissection or arterial injury. No significant stenosis of the brachiocephalic or subclavian arteries. CAROTID ARTERIES: No dissection, arterial injury, or hemodynamically significant stenosis by NASCET criteria. VERTEBRAL ARTERIES: No dissection, arterial injury, or significant stenosis. SOFT TISSUES: The lung apices are clear. No cervical or superior mediastinal lymphadenopathy. The larynx and pharynx are unremarkable. No acute abnormality of the salivary and thyroid glands. BONES: No acute osseous abnormality. CTA HEAD: ANTERIOR CIRCULATION: No significant stenosis of the intracranial internal carotid, anterior cerebral, or middle cerebral arteries. No aneurysm. POSTERIOR CIRCULATION: No significant stenosis of the vertebral, basilar, or posterior cerebral arteries. No aneurysm. OTHER: No dural venous sinus thrombosis on this non-dedicated study     1. Noncontrast CT of the head demonstrates presence of a 3 cm left posterior temporal lobe mass with surrounding vasogenic edema. Minimal midline shift from left to right is noted. 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 MRI of the brain with contrast is recommended. 2. Unremarkable CTA of the head and neck. The findings were sent to the Radiology Results [address] at 2:15 pm on 12/9/2020to be communicated to a licensed caregiver. Ct Cervical Spine Wo Contrast    Result Date: 12/9/2020  EXAMINATION: CT OF THE CERVICAL SPINE WITHOUT CONTRAST 12/9/2020 4:12 pm TECHNIQUE: CT of the cervical spine was performed without the administration of intravenous contrast. Multiplanar reformatted images are provided for review. Dose modulation, iterative reconstruction, and/or weight based adjustment of the mA/kV was utilized to reduce the radiation dose to as low as reasonably achievable. COMPARISON: None.  HISTORY: ORDERING SYSTEM PROVIDED HISTORY: weakness, possible fall TECHNOLOGIST PROVIDED HISTORY: weakness, possible fall FINDINGS: BONES/ALIGNMENT: There is no acute fracture or traumatic malalignment. DEGENERATIVE CHANGES: Multilevel degenerative changes SOFT TISSUES: There is no prevertebral soft tissue swelling. No acute abnormality of the cervical spine. Xr Chest Portable    Result Date: 12/9/2020  EXAMINATION: ONE XRAY VIEW OF THE CHEST 12/9/2020 3:47 pm COMPARISON: None. HISTORY: ORDERING SYSTEM PROVIDED HISTORY: Altered mental status TECHNOLOGIST PROVIDED HISTORY: Altered mental status Reason for Exam: supine Acuity: Unknown Type of Exam: Unknown FINDINGS: Cardiomediastinal silhouette is upper limits of normal in size. No pulmonary consolidation, pleural effusion, or pneumothorax. No acute osseous abnormality. No acute cardiopulmonary abnormality. Ct Brain Perfusion    Result Date: 12/9/2020  EXAMINATION: CT OF THE HEAD WITH CONTRAST WITH PERFUSION 12/9/2020 3:19 pm: TECHNIQUE: CT of the head/brain was performed with the administration of intravenous contrast. Multiplanar reformatted images are provided for review. MIP images are provided for review. Dose modulation, iterative reconstruction, and/or weight based adjustment of the mA/kV was utilized to reduce the radiation dose to as low as reasonably achievable. COMPARISON: CTA head and CT head from today HISTORY: ORDERING SYSTEM PROVIDED HISTORY: stroke TECHNOLOGIST PROVIDED HISTORY: stroke FINDINGS: CT PERFUSION: There is symmetric perfusion to the brain without a perfusion mismatch. There is ring-like matching increased cerebral blood volume, cerebral blood flow, and mean transit time in the left temporal lobe mass. 1. No perfusion mismatch. 2. Left temporal lobe mass demonstrates matching increased cerebral blood flow cerebral blood volume and mean transit time.      Cta Head Neck W Contrast    Result Date: 12/9/2020  EXAMINATION: CT OF THE HEAD WITHOUT CONTRAST; CTA OF THE HEAD AND NECK WITH CONTRAST 12/9/2020 1:46 pm; 12/9/2020 1:59 pm: TECHNIQUE: CT of the head was performed without the administration of intravenous contrast. Dose modulation, iterative reconstruction, and/or weight based adjustment of the mA/kV was utilized to reduce the radiation dose to as low as reasonably achievable.; CTA of the head and neck was performed with the administration of intravenous contrast. Multiplanar reformatted images are provided for review. MIP images are provided for review. Stenosis of the internal carotid arteries measured using NASCET criteria. Dose modulation, iterative reconstruction, and/or weight based adjustment of the mA/kV was utilized to reduce the radiation dose to as low as reasonably achievable. Noncontrast CT of the head with reconstructed 2-D images are also provided for review. COMPARISON: None. HISTORY: ORDERING SYSTEM PROVIDED HISTORY: [facility] stroke TECHNOLOGIST PROVIDED HISTORY: ams stroke Reason for Exam: ams Acuity: Acute Type of Exam: Initial; ORDERING SYSTEM PROVIDED HISTORY: stroke TECHNOLOGIST PROVIDED HISTORY: stroke Reason for Exam: ams Acuity: Acute Type of Exam: Initial FINDINGS: CT HEAD: BRAIN/VENTRICLES:  No acute intracranial hemorrhage or extraaxial fluid collection. Grey-white differentiation is maintained. No hydrocephalus is evident. There is a 3 cm mass within the posterior left temporal lobe with surrounding vasogenic edema. Slight midline shift from left to right is identified. ORBITS: The visualized portion of the orbits demonstrate no acute abnormality. SINUSES:  There is opacification of the right posterior ethmoid air cells and right sphenoid sinus. The mastoid air cells are well pneumatized SOFT TISSUES/SKULL: No acute abnormality of the visualized skull or soft tissues. CTA NECK: AORTIC ARCH/ARCH VESSELS: No dissection or arterial injury. No significant stenosis of the brachiocephalic or subclavian arteries.  CAROTID ARTERIES: No dissection, arterial injury, or hemodynamically significant stenosis by NASCET criteria. VERTEBRAL ARTERIES: No dissection, arterial injury, or significant stenosis. SOFT TISSUES: The lung apices are clear. No cervical or superior mediastinal lymphadenopathy. The larynx and pharynx are unremarkable. No acute abnormality of the salivary and thyroid glands. BONES: No acute osseous abnormality. CTA HEAD: ANTERIOR CIRCULATION: No significant stenosis of the intracranial internal carotid, anterior cerebral, or middle cerebral arteries. No aneurysm. POSTERIOR CIRCULATION: No significant stenosis of the vertebral, basilar, or posterior cerebral arteries. No aneurysm. OTHER: No dural venous sinus thrombosis on this non-dedicated study     1. Noncontrast CT of the head demonstrates presence of a 3 cm left posterior temporal lobe mass with surrounding vasogenic edema. Minimal midline shift from left to right is noted. 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 MRI of the brain with contrast is recommended. 2. Unremarkable CTA of the head and neck. The findings were sent to the Radiology Results [id number] at 2:15 pm on 12/9/2020to be communicated to a licensed caregiver. Ct Chest Abdomen Pelvis Wo Contrast    Result Date: 12/9/2020  EXAMINATION: CT OF THE CHEST, ABDOMEN, AND PELVIS WITHOUT CONTRAST 12/9/2020 4:37 pm TECHNIQUE: CT of the chest, abdomen and pelvis was performed without the administration of intravenous contrast. Multiplanar reformatted images are provided for review. Dose modulation, iterative reconstruction, and/or weight based adjustment of the mA/kV was utilized to reduce the radiation dose to as low as reasonably achievable. Residual intravenous contrast noted in the renal collecting systems. COMPARISON: Chest x-ray December 9, 2020 HISTORY: ORDERING SYSTEM PROVIDED HISTORY: abd pain TECHNOLOGIST PROVIDED HISTORY: abd pain FINDINGS: Chest: Mediastinum: Calcific coronary artery disease.   Heart size is normal. There is no pericardial effusion. Without intravenous contrast, evaluation for adenopathy is of lower sensitivity. Within the limitations of a noncontrast exam, there is no evidence of hilar or mediastinal lymphadenopathy. Lungs/pleura: Clear Soft Tissues/Bones: Normal Abdomen/Pelvis: Organs: Fat containing umbilical hernia contains knuckle of small bowel without evidence of obstruction. The liver, spleen, pancreas, and adrenals appear normal.  Gallbladder normal. Kidneys appear normal.  Residual intravenous contrast within the collecting systems. Bladder is filled with hyperdense contrast.  Prostate is enlarged and nodular. Fat containing left inguinal hernia. GI/Bowel: The stomach,small bowel, and colon appear normal. Appendix is not well visualized. Pelvis: Normal Peritoneum/Retroperitoneum: The abdominal aorta and iliac arteries are normal in caliber. There is no pathologic adenopathy. Retroaortic left renal vein. Bones/Soft Tissues: Multilevel vacuum disc phenomena. 1 cm anterior subluxation L5 on S1 with bilateral pars defects. No acute disease. Incidental findings as above. Ct Lumbar Spine Trauma Reconstruction    Result Date: 12/9/2020  EXAMINATION: CT OF THE THORACIC SPINE WITHOUT CONTRAST; CT OF THE LUMBAR SPINE WITHOUT CONTRAST  12/9/2020 TECHNIQUE: CT of the thoracic spine was performed without the administration of intravenous contrast. Multiplanar reformatted images are provided for review. Dose modulation, iterative reconstruction, and/or weight based adjustment of the mA/kV was utilized to reduce the radiation dose to as low as reasonably achievable.; CT of the lumbar spine was performed without the administration of intravenous contrast. Multiplanar reformatted images are provided for review. Dose modulation, iterative reconstruction, and/or weight based adjustment of the mA/kV was utilized to reduce the radiation dose to as low as reasonably achievable.  COMPARISON: None HISTORY: ORDERING SYSTEM PROVIDED HISTORY: BLE weakness TECHNOLOGIST PROVIDED HISTORY: BLE weakness; ORDERING SYSTEM PROVIDED HISTORY: LOWER EXTREMITY WEAKNESS TECHNOLOGIST PROVIDED HISTORY: BLE weakness FINDINGS: CT thoracic spine: BONES/ALIGNMENT: There is normal alignment of the spine. The vertebral body heights are maintained. No osseous destructive lesion is seen. DEGENERATIVE CHANGES: Diffuse mild degenerative and degenerative disc changes are noted. No gross spinal canal stenosis or bony neural foraminal narrowing of the thoracic spine. SOFT TISSUES: No paraspinal mass is seen. CT lumbar spine: BONES/ALIGNMENT: A 6 mm grade 1 anterolisthesis of L5 upon S1 is noted with bilateral nonacute L5 spondylolysis. Mild grade 1 retrolisthesis L4 upon L5 is present. No acute fracture is evident. DEGENERATIVE CHANGES: Mild-to-moderate degenerative and degenerative disc changes are present most significant at L2-L3, L3-L4 and L5-S1 with vacuum phenomenon. Multilevel disc protrusions are noted with disc protrusions noted L2-L3 through L5-S1. Exiting nerve roots at L2-L3 and L3-L4 elevated but not impinged. Exiting nerve roots are elevated and appear borderline impinged at L5-S1. No gross bony canal stenosis is evident. SOFT TISSUES/RETROPERITONEUM: No paraspinal mass is seen. CT thoracic spine: Degenerative and degenerative disc disease. No acute fracture or traumatic malalignment. CT lumbar spine: No acute fracture. Patient has an anterolisthesis L5 upon S1 and bilateral chronic spondylosis. Multilevel degenerative and degenerative disc changes are noted as above. Multilevel disc bulges. Exiting L5-S1 nerve roots are elevated and borderline impinged.      Ct Thoracic Spine Trauma Reconstruction    Result Date: 12/9/2020  EXAMINATION: CT OF THE THORACIC SPINE WITHOUT CONTRAST; CT OF THE LUMBAR SPINE WITHOUT CONTRAST  12/9/2020 TECHNIQUE: CT of the thoracic spine was performed without the administration of intravenous contrast. Multiplanar reformatted images are provided for review. Dose modulation, iterative reconstruction, and/or weight based adjustment of the mA/kV was utilized to reduce the radiation dose to as low as reasonably achievable.; CT of the lumbar spine was performed without the administration of intravenous contrast. Multiplanar reformatted images are provided for review. Dose modulation, iterative reconstruction, and/or weight based adjustment of the mA/kV was utilized to reduce the radiation dose to as low as reasonably achievable. COMPARISON: None HISTORY: ORDERING SYSTEM PROVIDED HISTORY: BLE weakness TECHNOLOGIST PROVIDED HISTORY: BLE weakness; ORDERING SYSTEM PROVIDED HISTORY: LOWER EXTREMITY WEAKNESS TECHNOLOGIST PROVIDED HISTORY: BLE weakness FINDINGS: CT thoracic spine: BONES/ALIGNMENT: There is normal alignment of the spine. The vertebral body heights are maintained. No osseous destructive lesion is seen. DEGENERATIVE CHANGES: Diffuse mild degenerative and degenerative disc changes are noted. No gross spinal canal stenosis or bony neural foraminal narrowing of the thoracic spine. SOFT TISSUES: No paraspinal mass is seen. CT lumbar spine: BONES/ALIGNMENT: A 6 mm grade 1 anterolisthesis of L5 upon S1 is noted with bilateral nonacute L5 spondylolysis. Mild grade 1 retrolisthesis L4 upon L5 is present. No acute fracture is evident. DEGENERATIVE CHANGES: Mild-to-moderate degenerative and degenerative disc changes are present most significant at L2-L3, L3-L4 and L5-S1 with vacuum phenomenon. Multilevel disc protrusions are noted with disc protrusions noted L2-L3 through L5-S1. Exiting nerve roots at L2-L3 and L3-L4 elevated but not impinged. Exiting nerve roots are elevated and appear borderline impinged at L5-S1. No gross bony canal stenosis is evident. SOFT TISSUES/RETROPERITONEUM: No paraspinal mass is seen. CT thoracic spine: Degenerative and degenerative disc disease.   No acute fracture or traumatic malalignment. CT lumbar spine: No acute fracture. Patient has an anterolisthesis L5 upon S1 and bilateral chronic spondylosis. Multilevel degenerative and degenerative disc changes are noted as above. Multilevel disc bulges. Exiting L5-S1 nerve roots are elevated and borderline impinged. EKG:  EKG Interpretation    Interpreted by emergency department physician    Rhythm: normal sinus   Rate: normal  Axis: normal  Ectopy: none  Conduction: first degree AV block  ST Segments: no acute change  T Waves: no acute change  Q Waves: none    Clinical Impression: non-specific EKG    Yomi Odell    EMERGENCY DEPARTMENT COURSE:  ED Course as of Dec 09 1834   Wed Dec 09, 2020   1504 CT head consistent with mass. Neurosurgery consulted. [JG]   1505 Repeat troponin ordered. [JG]   1600 Repeat troponin stable. Discussed with neuro critical care, who accepted the patient for admission. On their evaluation, patient was having some mild abdominal tenderness. Remainder of CT scans were ordered without contrast given patient has already received a contrast load twice. MRI was ordered per stroke team.  Patient awake, acting somewhat more appropriate but is likely not back at his baseline given he lived in [facility] previously. Patient's daughter was updated on patient's admission and findings so far. [JG]      ED Course User Index  [JG] Deberah Osgood Gruenbaum,           PROCEDURES:  None    CONSULTS:  IP CONSULT TO STROKE TEAM  IP CONSULT TO NEUROSURGERY  IP CONSULT TO NEUROCRITICAL CARE  IP CONSULT TO PHARMACY  PHARMACY TO CHANGE BASE FLUIDS    CRITICAL CARE:  Please see attending note    FINAL IMPRESSION      1. Brain mass    2. [place] coma scale total score 3-8, in the field (EMT or ambulance) [facility])          [address] / [address] Admitted 12/09/2020 04:53:40 PM      PATIENT REFERRED TO:  No follow-up provider specified.     DISCHARGE MEDICATIONS:  New Prescriptions    No medications on file Nelson Thompson DO  Emergency Medicine Resident    (Please note that portions of this note were completed with a voice recognition program.Efforts were made to edit the dictations but occasionally words are mis-transcribed.)     Nelson Thompson DO  Resident  12/09/20 [id number]

## 2020-12-09 NOTE — ED NOTES
Pt able to communicate a few words to bed side nurses. Pt stated \"he may have had a stroke but didn't know\" Pt was able to lift hips for us while undressing him at this time.       Edy Simpson RN  12/09/20 [id number]

## 2020-12-09 NOTE — ED NOTES
MRI will complete MRI checklist via radiologist and call dept when ready.      Krysta Avilez RN  12/09/20 [id number]

## 2020-12-09 NOTE — PLAN OF CARE
Patient was cleared through xrays for his MRI. Spoke to [facility] and stated that the patient is ICU. RN to call back when someone is available to bring the patient over.

## 2020-12-09 NOTE — CONSULTS
Department of Neurosurgery                                            Nurse Practitioner Consult Note      Reason for Consult:  Brain Mass  Requesting Physician:  Dr. Gildardo Vega  Neurosurgeon:   [] Dr. Logan Hernandez  [] Dr. Donavan Rashid  [x] Dr. Abiodun Gonzalez  [] Dr. Jasmin Gold      History Obtained From:  Kamryn torres, Dr. Lisbet Stevens:         Weakness, dysarthria    HISTORY OF PRESENT ILLNESS:       The patient is a 66 y.o. male who presents with complaints of confusion, dysarthria, unresponsiveness and possible fall. No family present at bedside, history limited. Stroke team was able to speak with daughter who felt that the patient had posted something on Facebook that did not make sense and was not like his usual self. Per report patient called a neighbor this morning telling him that he had fallen and it was noted he had some slurred speech. EMS was called and patient was brought via [facility] with concerns for stroke. Stroke alert was called on patient's arrival.  Per report from stroke team patient did not have any significant past medical history and there is no known history of brain mass, stroke or seizure. PAST MEDICAL HISTORY :       Past Medical History:    History reviewed. No pertinent past medical history. Past Surgical History:    History reviewed. No pertinent surgical history.     Social History:   Social History     Socioeconomic History    Marital status: Not on file     Spouse name: Not on file    Number of children: Not on file    Years of education: Not on file    Highest education level: Not on file   Occupational History    Not on file   Social Needs    Financial resource strain: Not on file    Food insecurity     Worry: Not on file     Inability: Not on file    Transportation needs     Medical: Not on file     Non-medical: Not on file   Tobacco Use    Smoking status: Unknown If Ever Smoked   Substance and Sexual Activity    Alcohol use: Not on file Comment: SAUNDRA     Drug use: Not on file    Sexual activity: Not on file     Comment: SAUNDRA   Lifestyle    Physical activity     Days per week: Not on file     Minutes per session: Not on file    Stress: Not on file   Relationships    Social connections     Talks on phone: Not on file     Gets together: Not on file     Attends Bahai service: Not on file     Active member of club or organization: Not on file     Attends meetings of clubs or organizations: Not on file     Relationship status: Not on file    Intimate partner violence     Fear of current or ex partner: Not on file     Emotionally abused: Not on file     Physically abused: Not on file     Forced sexual activity: Not on file   Other Topics Concern    Not on file   Social History Narrative    Not on file       Family History:   History reviewed. No pertinent family history. Allergies:  Patient has no allergy information on record. Home Medications:  Prior to Admission medications    Not on File       Current Medications:   No current facility-administered medications for this encounter.      REVIEW OF SYSTEMS:       Unable to be obtained due to mental status    PHYSICAL EXAM:       /81   Pulse 73   Temp 98.6 °F (37 °C) (Axillary)   Resp 16   Ht 6' 2\" (1.88 m)   Wt 200 lb (90.7 kg)   SpO2 98%   BMI 25.68 kg/m²       CONSTITUTIONAL: no apparent distress, appears stated age   HEAD: normocephalic, atraumatic   ENT:  Tacky mucous membranes   NECK: supple, symmetric, no midline tenderness to palpation, c-collar in place   BACK: without midline tenderness, step-offs or deformities   LUNGS: clear to auscultation bilaterally   CARDIOVASCULAR: regular rate and rhythm   ABDOMEN: Soft, non-tender, non-distended with normal active bowel sounds   NEUROLOGIC:  EYE OPENING     Spontaneous - 4 []       To voice - 3 [x]       To pain - 2 []       None - 1 []    VERBAL RESPONSE     Appropriate, oriented - 5 []       Dazed or confused - 4 [] Syllables, expletives - 3 [x]       Grunts - 2 []       None - 1 []    MOTOR RESPONSE     Spontaneous, command - 6 []       Localizes pain - 5 []       Withdraws pain - 4 [x]       Abnormal flexion - 3 []       Abnormal extension - 2 []       None - 1 []            Total GCS: 10    Mental Status: Confused, not answering questions appropriately               Cranial Nerves:    II: Visual acuity: Response to threat bilaterally  No gaze deviation  III: Pupils:  equal, round, reactive to light  VII: Facial strength: abnormal - left-sided facial droop    Motor Exam:    Tone:  normal    Right arm with drift, 3 out of 5 strength, left upper extremity - no effort against gravity, bilateral lower extremity - no effort against gravity    Sensory:    Withdraws to pain in bilateral upper and lower extremities, diminished sensation of the left side    Deep Tendon Reflexes:    Right Knee:  2+  Left Knee:  2+    Plantar Response:    Right:  downgoing  Left:  downgoing      Gait: Unable to assess   SKIN: no rash       LABS AND IMAGING:     CBC with Differential:    Lab Results   Component Value Date    WBC 5.6 12/09/2020    RBC 5.40 12/09/2020    HGB 16.8 12/09/2020    HCT 50.5 12/09/2020     12/09/2020    MCV 93.5 12/09/2020    MCH 31.1 12/09/2020    MCHC 33.3 12/09/2020    RDW 13.0 12/09/2020    LYMPHOPCT 18 12/09/2020    MONOPCT 7 12/09/2020    BASOPCT 0 12/09/2020    MONOSABS 0.39 12/09/2020    LYMPHSABS 0.98 12/09/2020    EOSABS <0.03 12/09/2020    BASOSABS <0.03 12/09/2020    DIFFTYPE NOT REPORTED 12/09/2020     BMP:    Lab Results   Component Value Date     12/09/2020    K 4.1 12/09/2020    CL 98 12/09/2020    CO2 25 12/09/2020    BUN 19 12/09/2020    CREATININE 0.93 12/09/2020    CALCIUM 9.5 12/09/2020    GFRAA >60 12/09/2020    LABGLOM >60 12/09/2020    GLUCOSE 113 12/09/2020       Radiology Review:    CT Head:  1.  Noncontrast CT of the head demonstrates presence of a 3 cm left posterior    temporal lobe mass with surrounding vasogenic edema.  Minimal midline shift    from left to right is noted.  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 MRI of the brain with contrast is recommended. 2. Unremarkable CTA of the head and neck. The findings were sent to the Radiology Results [address] at 2:15    pm on 12/9/2020to be communicated to a licensed caregiver. ASSESSMENT AND PLAN:     There is no problem list on file for this patient. A/P:  This is a 66 y.o. male with confusion, dysarthria, weakness and found to have 3 cm left posterior temporal lobe mass with surrounding vasogenic edema, minimal midline shift from left to right  Patient care will be discussed with attending, will reevaluated patient along with attending.      - No neurosurgical interventions planned for now  - CTLS recommendations: Clear   - Obtain CT head for any acute change in mental status   - Follow-up MRI brain with and without contrast w/ stealth protocol   - Neuro checks per protocol  - Hold all antiplatelets and anticoagulants  - Continue Keppra 500mg BID    Additional recommendations may follow    Please contact neurosurgery with any changes in patients neurologic status. Thank you for your consult.        DO TERRELL Nieves pager [phone]  12/9/2020  3:09 PM

## 2020-12-10 LAB
ABSOLUTE EOS #: 0.08 K/UL (ref 0–0.44)
ABSOLUTE IMMATURE GRANULOCYTE: <0.03 K/UL (ref 0–0.3)
ABSOLUTE LYMPH #: 1.31 K/UL (ref 1.1–3.7)
ABSOLUTE MONO #: 0.52 K/UL (ref 0.1–1.2)
ANION GAP SERPL CALCULATED.3IONS-SCNC: 12 MMOL/L (ref 9–17)
BASOPHILS # BLD: 0 % (ref 0–2)
BASOPHILS ABSOLUTE: <0.03 K/UL (ref 0–0.2)
BUN BLDV-MCNC: 19 MG/DL (ref 8–23)
BUN/CREAT BLD: ABNORMAL (ref 9–20)
CALCIUM SERPL-MCNC: 8.5 MG/DL (ref 8.6–10.4)
CHLORIDE BLD-SCNC: 100 MMOL/L (ref 98–107)
CO2: 25 MMOL/L (ref 20–31)
CREAT SERPL-MCNC: 0.94 MG/DL (ref 0.7–1.2)
CULTURE: NO GROWTH
DIFFERENTIAL TYPE: ABNORMAL
EKG ATRIAL RATE: 89 BPM
EKG P AXIS: 44 DEGREES
EKG P-R INTERVAL: 230 MS
EKG Q-T INTERVAL: 344 MS
EKG QRS DURATION: 88 MS
EKG QTC CALCULATION (BAZETT): 418 MS
EKG R AXIS: 11 DEGREES
EKG T AXIS: 89 DEGREES
EKG VENTRICULAR RATE: 89 BPM
EOSINOPHILS RELATIVE PERCENT: 1 % (ref 1–4)
ESTIMATED AVERAGE GLUCOSE: 111 MG/DL
GFR AFRICAN AMERICAN: >60 ML/MIN
GFR NON-AFRICAN AMERICAN: >60 ML/MIN
GFR SERPL CREATININE-BSD FRML MDRD: ABNORMAL ML/MIN/{1.73_M2}
GFR SERPL CREATININE-BSD FRML MDRD: ABNORMAL ML/MIN/{1.73_M2}
GLUCOSE BLD-MCNC: 102 MG/DL (ref 70–99)
HBA1C MFR BLD: 5.5 % (ref 4–6)
HCT VFR BLD CALC: 45.1 % (ref 40.7–50.3)
HEMOGLOBIN: 15 G/DL (ref 13–17)
IMMATURE GRANULOCYTES: 0 %
LYMPHOCYTES # BLD: 24 % (ref 24–43)
Lab: NORMAL
MAGNESIUM: 2 MG/DL (ref 1.6–2.6)
MCH RBC QN AUTO: 31.3 PG (ref 25.2–33.5)
MCHC RBC AUTO-ENTMCNC: 33.3 G/DL (ref 28.4–34.8)
MCV RBC AUTO: 94.2 FL (ref 82.6–102.9)
MONOCYTES # BLD: 9 % (ref 3–12)
NRBC AUTOMATED: 0 PER 100 WBC
PDW BLD-RTO: 12.9 % (ref 11.8–14.4)
PHOSPHORUS: 3 MG/DL (ref 2.5–4.5)
PLATELET # BLD: 158 K/UL (ref 138–453)
PLATELET ESTIMATE: ABNORMAL
PMV BLD AUTO: 9.7 FL (ref 8.1–13.5)
POTASSIUM SERPL-SCNC: 3.6 MMOL/L (ref 3.7–5.3)
RBC # BLD: 4.79 M/UL (ref 4.21–5.77)
RBC # BLD: ABNORMAL 10*6/UL
SEG NEUTROPHILS: 66 % (ref 36–65)
SEGMENTED NEUTROPHILS ABSOLUTE COUNT: 3.58 K/UL (ref 1.5–8.1)
SODIUM BLD-SCNC: 137 MMOL/L (ref 135–144)
SPECIMEN DESCRIPTION: NORMAL
TROPONIN INTERP: ABNORMAL
TROPONIN T: ABNORMAL NG/ML
TROPONIN, HIGH SENSITIVITY: 28 NG/L (ref 0–22)
WBC # BLD: 5.5 K/UL (ref 3.5–11.3)
WBC # BLD: ABNORMAL 10*3/UL

## 2020-12-10 PROCEDURE — 92523 SPEECH SOUND LANG COMPREHEN: CPT

## 2020-12-10 PROCEDURE — 51798 US URINE CAPACITY MEASURE: CPT

## 2020-12-10 PROCEDURE — 2580000003 HC RX 258: Performed by: FAMILY MEDICINE

## 2020-12-10 PROCEDURE — 93010 ELECTROCARDIOGRAM REPORT: CPT | Performed by: INTERNAL MEDICINE

## 2020-12-10 PROCEDURE — 84100 ASSAY OF PHOSPHORUS: CPT

## 2020-12-10 PROCEDURE — 2500000003 HC RX 250 WO HCPCS: Performed by: STUDENT IN AN ORGANIZED HEALTH CARE EDUCATION/TRAINING PROGRAM

## 2020-12-10 PROCEDURE — 85025 COMPLETE CBC W/AUTO DIFF WBC: CPT

## 2020-12-10 PROCEDURE — 6360000002 HC RX W HCPCS: Performed by: STUDENT IN AN ORGANIZED HEALTH CARE EDUCATION/TRAINING PROGRAM

## 2020-12-10 PROCEDURE — 97162 PT EVAL MOD COMPLEX 30 MIN: CPT

## 2020-12-10 PROCEDURE — 95720 EEG PHY/QHP EA INCR W/VEEG: CPT | Performed by: PSYCHIATRY & NEUROLOGY

## 2020-12-10 PROCEDURE — 84484 ASSAY OF TROPONIN QUANT: CPT

## 2020-12-10 PROCEDURE — 36415 COLL VENOUS BLD VENIPUNCTURE: CPT

## 2020-12-10 PROCEDURE — 95714 VEEG EA 12-26 HR UNMNTR: CPT

## 2020-12-10 PROCEDURE — 6370000000 HC RX 637 (ALT 250 FOR IP): Performed by: STUDENT IN AN ORGANIZED HEALTH CARE EDUCATION/TRAINING PROGRAM

## 2020-12-10 PROCEDURE — 83735 ASSAY OF MAGNESIUM: CPT

## 2020-12-10 PROCEDURE — 80048 BASIC METABOLIC PNL TOTAL CA: CPT

## 2020-12-10 PROCEDURE — 97166 OT EVAL MOD COMPLEX 45 MIN: CPT

## 2020-12-10 PROCEDURE — 99223 1ST HOSP IP/OBS HIGH 75: CPT | Performed by: PSYCHIATRY & NEUROLOGY

## 2020-12-10 PROCEDURE — 97535 SELF CARE MNGMENT TRAINING: CPT

## 2020-12-10 PROCEDURE — 97530 THERAPEUTIC ACTIVITIES: CPT

## 2020-12-10 PROCEDURE — 83036 HEMOGLOBIN GLYCOSYLATED A1C: CPT

## 2020-12-10 PROCEDURE — 2000000003 HC NEURO ICU R&B

## 2020-12-10 RX ORDER — DEXAMETHASONE SODIUM PHOSPHATE 4 MG/ML
4 INJECTION, SOLUTION INTRA-ARTICULAR; INTRALESIONAL; INTRAMUSCULAR; INTRAVENOUS; SOFT TISSUE EVERY 6 HOURS
Status: DISCONTINUED | OUTPATIENT
Start: 2020-12-10 | End: 2020-12-13

## 2020-12-10 RX ADMIN — Medication 10 ML: at 21:00

## 2020-12-10 RX ADMIN — LEVETIRACETAM 500 MG: 5 INJECTION INTRAVENOUS at 16:17

## 2020-12-10 RX ADMIN — ENOXAPARIN SODIUM 30 MG: 100 INJECTION SUBCUTANEOUS at 21:00

## 2020-12-10 RX ADMIN — DESMOPRESSIN ACETATE 40 MG: 0.2 TABLET ORAL at 20:59

## 2020-12-10 RX ADMIN — FAMOTIDINE 20 MG: 10 INJECTION INTRAVENOUS at 21:00

## 2020-12-10 RX ADMIN — POTASSIUM BICARBONATE 40 MEQ: 782 TABLET, EFFERVESCENT ORAL at 16:17

## 2020-12-10 RX ADMIN — FAMOTIDINE 20 MG: 10 INJECTION INTRAVENOUS at 08:55

## 2020-12-10 RX ADMIN — ENOXAPARIN SODIUM 30 MG: 100 INJECTION SUBCUTANEOUS at 12:02

## 2020-12-10 RX ADMIN — LEVETIRACETAM 500 MG: 5 INJECTION INTRAVENOUS at 01:50

## 2020-12-10 RX ADMIN — DEXAMETHASONE SODIUM PHOSPHATE 4 MG: 4 INJECTION, SOLUTION INTRAMUSCULAR; INTRAVENOUS at 18:59

## 2020-12-10 ASSESSMENT — PAIN - FUNCTIONAL ASSESSMENT: PAIN_FUNCTIONAL_ASSESSMENT: 0-10

## 2020-12-10 ASSESSMENT — PAIN SCALES - GENERAL: PAINLEVEL_OUTOF10: 0

## 2020-12-10 NOTE — PROCEDURES
LONG-TERM EEG-VIDEO [id number]    Patient: Ariadna Hernandez  Age: 66 y.o. MRN: [id number]    Referring Physician: No ref. provider found  History: The patient is a 66 y.o. male who presented breakthrough seizure/encephalopathy. This long-term video-EEG monitoring study was performed to determine the nature of the patient's clinical events. The patient is on neuroactive medications. Ariadna Hernandez   Current Facility-Administered Medications   Medication Dose Route Frequency Provider Last Rate Last Dose    0.9 % sodium chloride infusion   Intravenous Continuous Sanjana Corns,  mL/hr at 12/09/20 2051      labetalol (NORMODYNE;TRANDATE) injection 10 mg  10 mg Intravenous Q10 Min PRN Sanjana Corns, DO        levetiracetam (KEPPRA) 500 mg/100 mL IVPB  500 mg Intravenous Q12H Sanjana Corns, DO   Stopped at 12/10/20 0205    famotidine (PEPCID) injection 20 mg  20 mg Intravenous BID Sanjana Corns, DO   20 mg at 12/09/20 2049    atorvastatin (LIPITOR) tablet 40 mg  40 mg Oral Nightly Sanjana Corns, DO   40 mg at 12/09/20 2049    sodium chloride flush 0.9 % injection 10 mL  10 mL Intravenous PRN Madisyn Tang MD   10 mL at 12/09/20 2049     Technical Description: This is a 21-channel digital EEG recording with time-locked video. Electrodes were placed in accordance with the 10-20 International System of Electrode Placement. Single lead EKG monitoring was included. Baseline EEG Recording:  A formal baseline EEG recording was not obtained. Day 1 - 12/9/20, starting at 19:39    Interictal EEG Samples: The background activity over the right hemisphere consisted of 8 to 9 Hz of alpha activity of 25 to 40 µV. Activity over the left hemisphere was less well moderated and consisted of 7 to 8 Hz of activity.   There was continuous left frontotemporal polymorphic 2 to 3 Hz of delta slowing of 40 to 50 µV.  Occasional left anterior temporal sharp waves at [facility] were seen. During behavioral sleep, sleep spindles were seen better formed over the right hemisphere. . The EKG channel revealed no abnormalities. Ictal EEG Recording / Patient Events: During this period the patient had no events or seizures. Summary: During this day of recording no events were recorded. The interictal EEG was abnormal due to continuous left frontotemporal polymorphic delta slowing suggesting underlying structural defect. Occasional left anterior temporal sharp waves at F7 conferred an increased risk for focal onset seizures. Monitoring was continued in order to record the patient's typical events. The EKG channel revealed no abnormalities. Day 2 - 12/10/20, reviewed through 7:30 pm    Interictal EEG Samples: Interictal EEG was unchanged from yesterday. Ictal EEG Recording / Patient Events: During this period the patient had no events or seizures. Summary: During this day of recording no events were recorded. The interictal EEG was abnormal due to continuous left frontotemporal polymorphic delta slowing suggesting underlying structural defect. Rare left anterior temporal sharp waves at F7 conferred an increased risk for focal onset seizures. Monitoring was continued in order to record the patient's typical events. The EKG channel revealed no abnormalities. Elaine Ellison MD  Diplomate, American Board of Psychiatry and Neurology  Diplomate, American Board of Clinical Neurophysiology  Diplomate, American Board of Epilepsy     Please note this is a preliminary report and updated daily. The final report will have a summary of behavior and electrographic findings with clinical correlation.

## 2020-12-10 NOTE — CARE COORDINATION
Case Management Initial Discharge Plan  Lillie Malik,             Spoke with daughter via phone to discuss discharge plans. Information verified: address, contacts, phone number, , insurance Yes    Emergency Contact/Next of Kin name & number: Daughter Jennifer Given [phone]    PCP: Dr Nuzhat Segura  Date of last visit: 2020    Insurance Provider: [facility] [place] [facility]    Discharge Planning    Living Arrangements:    alone  Support Systems:   family and friends    Home has 1 stories  0 stairs to climb to get into front door,  0stairs to climb to reach second floor  Location of bedroom/bathroom in home main floor    Patient able to perform ADL's:Independent    Current Services (outpatient & in home) none   DME equipment: none   DME provider: n/a    Receiving oral anticoagulation therapy? No    If indicated:   Physician managing anticoagulation treatment:   Where does patient obtain lab work for ATC treatment? Potential Assistance Needed:       Patient agreeable to home care: Yes  Freedom of choice provided:  yes    Prior SNF/Rehab Placement and Facility:   Agreeable to SNF/Rehab: acute rehab not snf  Avery of choice provided: yes     Evaluation: no    Expected Discharge date:       Patient expects to be discharged to: Follow Up Appointment: Best Day/ Time:      Transportation provider: self and family  Transportation arrangements needed for discharge: Yes    Readmission Risk              Risk of Unplanned Readmission:        11             Does patient have a readmission risk score greater than 14?: no  If yes, follow-up appointment must be made within 7 days of discharge.      Goals of Care: safety    [facility]          Electronically signed by Dean RN on 12/10/20 at 5:10 PM EST

## 2020-12-10 NOTE — PROGRESS NOTES
Patient arrived to unit from MRI. Vitals taken. Patient and family updated. Report given to Fabio Reed and Winston Garcia.

## 2020-12-10 NOTE — PROGRESS NOTES
Daily Progress Note  Neuro Critical Care    Patient Name: Gregg Subramanian  Patient : 1945  Room/Bed: 0522/0522-01  Code Status: Full code  Allergies: No Known Allergies    CHIEF COMPLAINT:        Altered mental status, brain mass      INTERVAL HISTORY    Initial Presentation (Admitted 20): The patient is a 66 y.o. male who presented to the emergency department for altered mental status. Supposedly making Facebook posts last night that did not make sense. Daughter was concerned and called her sister who lives closer to patient. Neighbor spoke to patient on the phone this afternoon and noted garbled speech and the patient suddenly stopped talking. EMS was called. Found patient altered with decreased responsiveness. Also concern for left-sided facial droop. Transported via [facility] with concern for stroke. Vital stable in route with exception of mild hypertension. Glucose within normal limits. Stroke alert called upon arrival.  Unclear last known well. GCS between 8 and 10 upon arrival; protecting airway. Initial NIH per stroke note of 20 for the following deficits: level of consciousness, facial palsy, left arm drift, weakness in lower extremities bilaterally, sensory loss, aphasia. Was taken immediately for CT head which demonstrated left posterior temporal lobe mass of approximately 3 cm with surrounding vasogenic edema and minimal midline shift. Neurosurgery team consulted. Plan for MRI with and without contrast.  Hold steroids at this time. Concern for possible seizure-like activity was loaded with 1 g of Keppra. Labs significant for mild hyponatremia 133 as well as mild elevation in myoglobin at 111. Also had mild troponin elevation but repeat troponin trending downward. Family contacted by ER resident. Only known medical problems include hypertension and possible dementia per ED note.   No additional medical information on our records or in Care Everywhere.     Upon exam patient was able to state his name. Otherwis mumbling incomprehensible sounds. Intermittently following commands. Pupils equal round reactive to light. Extraocular movements intact. Mild left-sided facial droop. Able to hold both arms out in front of him with no obvious pronator drift. No spontaneous movements of lower extremities but retracting to pain. Last 24h:   No acute events overnight. More alert this morning. A/O to person and can state that he is in hospital. Speaking in short sentences with mild dysarthria and broken speech. Persistent facial droop. Following commands. No pronator drift. Mild coughing with bedside swallow; speech to perform formal swallow study later today.      CURRENT MEDICATIONS:  SCHEDULED MEDICATIONS:   levetiracetam  500 mg Intravenous Q12H    famotidine (PEPCID) injection  20 mg Intravenous BID    atorvastatin  40 mg Oral Nightly     CONTINUOUS INFUSIONS:   sodium chloride 100 mL/hr at 20     PRN MEDICATIONS:   labetalol, sodium chloride flush    VITALS:  Temperature Range: Temp: 96.8 °F (36 °C) Temp  Av.8 °F (36.6 °C)  Min: 96.8 °F (36 °C)  Max: 98.6 °F (37 °C)  BP Range: Systolic (26MXZ), BNM:320 , Min:111 , OQA:264     Diastolic (88TTW), GOL:45, Min:57, Max:96    Pulse Range: Pulse  Av.6  Min: 55  Max: 92  Respiration Range: Resp  Av  Min: 10  Max: 26  Current Pulse Ox: SpO2: 97 %  24HR Pulse Ox Range: SpO2  Av %  Min: 96 %  Max: 100 %  Patient Vitals for the past 12 hrs:   BP Temp Temp src Pulse Resp SpO2   12/10/20 0700 (!) 116/57 -- -- 56 20 97 %   12/10/20 0600 (!) 111/57 -- -- 58 21 96 %   12/10/20 0500 117/64 -- -- 55 22 96 %   12/10/20 0400 130/66 96.8 °F (36 °C) Oral 61 20 96 %   12/10/20 0200 (!) 147/68 -- -- 62 21 98 %   12/10/20 0100 126/69 -- -- 66 24 97 %   12/10/20 0000 121/68 97.7 °F (36.5 °C) Oral 66 20 98 %   20 2300 130/68 -- -- 69 18 100 %   20 2200 139/65 -- -- 68 23 99 %   20 2100 (!) 158/83 -- -- 72 26 98 %   12/09/20 2000 (!) 140/71 97.9 °F (36.6 °C) Oral 60 15 97 %     Estimated body mass index is 25.68 kg/m² as calculated from the following:    Height as of this encounter: 6' 2\" (1.88 m). Weight as of this encounter: 200 lb (90.7 kg).  []<16 Severe malnutrition  []16-16.99 Moderate malnutrition  []17-18.49 Mild malnutrition  []18.5-24.9 Normal  [x]25-29.9 Overweight (not obese)  []30-34.9 Obese class 1 (Low Risk)  []35-39.9 Obese class 2 (Moderate Risk)  []?40 Obese class 3 (High Risk)    RECENT LABS:   Lab Results   Component Value Date    WBC 5.5 12/10/2020    HGB 15.0 12/10/2020    HCT 45.1 12/10/2020     12/10/2020    HDL 37 (L) 12/09/2020    ALT 28 12/09/2020    AST 23 12/09/2020     12/10/2020    K 3.6 (L) 12/10/2020     12/10/2020    CREATININE 0.94 12/10/2020    BUN 19 12/10/2020    CO2 25 12/10/2020    TSH 2.76 12/09/2020    INR 1.0 12/09/2020     24 HOUR INTAKE/OUTPUT:    Intake/Output Summary (Last 24 hours) at 12/10/2020 0758  Last data filed at 12/10/2020 0400  Gross per 24 hour   Intake 742 ml   Output 400 ml   Net 342 ml       IMAGING:   MRI BRAIN W WO CONTRAST   Final Result   Ring-enhancing left temporal lobe mass consistent with abscess versus primary   or secondary neoplasm. Local edema and slight midline shift. Mixed   cytotoxic and vasogenic edema. Microscopic hemorrhage. CT CHEST ABDOMEN PELVIS WO CONTRAST   Final Result   No acute disease. Incidental findings as above. CT LUMBAR SPINE TRAUMA RECONSTRUCTION   Final Result   CT thoracic spine: Degenerative and degenerative disc disease. No acute   fracture or traumatic malalignment. CT lumbar spine: No acute fracture. Patient has an anterolisthesis L5 upon   S1 and bilateral chronic spondylosis. Multilevel degenerative and   degenerative disc changes are noted as above. Multilevel disc bulges. Exiting L5-S1 nerve roots are elevated and borderline impinged.          CT THORACIC SPINE Nabil Pena  [] There are no new interval images to review. PHYSICAL EXAM       CONSTITUTIONAL:  Well developed. Drowsy this morning but more alert. GCS 15. alert and oriented x 1, in no acute distress. Nontoxic. Dysarthric. Broken speech   HEAD:  normocephalic, atraumatic    EYES:  PERRLA, EOMI.   ENT:  moist mucous membranes   NECK:  supple, symmetric   LUNGS:  Equal air entry bilaterally   CARDIOVASCULAR:  normal s1 / s2, RRR, distal pulses intact   ABDOMEN:  Soft, no rigidity   NEUROLOGIC:  Mental Status:  Not oriented to person, Drowsy but wakes to verbal stimuli. GCS 15. Cranial Nerves:    II: Visual acuity:  Responds to visual threat  III: Pupils:  equal, round, reactive to light  III,IV,VI: Extra Ocular Movements: intact  VII: Facial strength: abnormal mild left facial droop  IX: Palate:  intact  XII: Tongue movement:  normal    Motor Exam:    Drift:  absent  Tone:  normal    4/5 strength bilateral UEs  No spont movements of lower extremities and will no follow commands but will lift off bed to painful stimuli    Sensory:    Touch:    Right Upper Extremity:  normal  Left Upper Extremity:  normal  Right Lower Extremity:  abnormal - diminished sensation  Left Lower Extremity:  abnormal - diminished sensatio    Deep Tendon Reflexes:    Right Knee:  1+  Left Knee:  1+    Plantar Response:  Right:  equivocal  Left:  equivocal    Clonus:  absent         ASSESSMENT AND PLAN:       This is a 66 y.o. male with altered mental status. Initially stroke alert due to dysarthria and concern for unilateral deficits. Found to have a 3cm left temporal lobe brain mass, vasogenic edema, minimal midline shift. MRI showed ring enhancing lesion concerning for mass vs abscess.      NEUROLOGIC:  - CT head: left posterior temporal lobe bass approximately 3cm in diameter with surrounding vasogenic edema and minimal midline shift.  - AEDs: loaded with 1g keppra in ED; will continue keppra 500mg BID              - LTME; negative; continue through 12/11  - Altered mental status of unclear etiology              - unclear last known well              - Brain mass on CT; concern for infectious etiology vs neoplasm              - MRI: Ring-enhancing left temporal lobe mass consistent with abscess versus neoplasm. Mixed cytotoxic and vasogenic edema with minimal midline shift. Microscopic hemorrhage.    -NS following; appreciate recs   - start steroids if cleared by NS team  - Inintially loaded with ASA due to concern for stroke; will hold AP at this time  - Goal SBP <160  - Neuro checks per protocol    CARDIOVASCULAR:  - Goal SBP <160  - Hx of HTN per family but unclear of home meds  - echocardiogram completed; results pending  - continue lipitor  - Lipid: mildly low HDL, otherwise unremarkable   - Trop 34 > 31 > 28  - Continue telemetry    PULMONARY:  - No respiratory distress; protectin airway  - CXR unremarkable  - CT chest/abd/pelvis without to evaluate for primary CA    RENAL/FLUID/ELECTROLYTE:  - BUN 19/ Creatinine 0.93  - normal kidney function  - Monitor I/Os  - IVF: 100cc/h; will decrease as pt is able to tolerate PO  - Initial mild hyponatremia; resolved after fluids; sodium 137  - Replace electrolytes PRN  - Daily BMP    GI/NUTRITION:  NUTRITION:  Diet NPO Effective Now  - Bowel regimen: Glycolax PRN  - GI prophylaxis: Pepcid BID  - ammonia and LFTs within normal limits  - CT Abd/pelvis without contrast pending    ID:  - Afebrile   - CXR normal  - no leukocytosis  - UA/Urine culture pending  - covid negative (12/9/20)  - Continue to monitor for fevers  - Daily CBC    HEME:   - H&H 15/45  - Platelets 400  - Daily CBC    ENDOCRINE:  - Continue to monitor blood glucose, goal <180  - A1C pending    OTHER:  - PT/OT/ST   - Code Status: Full code    PROPHYLAXIS:  Stress ulcer: H2 blocker; DC after starting PO    DVT PROPHYLAXIS:  - SCD sleeves - Thigh High   - Lovenox for DVT prophylaxis; will hold prior to NS taking to

## 2020-12-10 NOTE — CARE COORDINATION
Spoke to daughter on the phone,  She informs me that at this time the patient lives alone but she and her fiance are planning on moving in with the patient post discharge

## 2020-12-10 NOTE — PLAN OF CARE
TODAY:  12/10/20      AWAKE & FOLLOWING COMMANDS:  [] No   [x] Yes    INTUBATED:   [x] No   [] Yes    SEDATION/ANALGESIA:    [] Propofol gtt  [] Versed gtt  [] Ativan gtt   [x] No Sedation       FEEDING: Able to take PO?  [] No:  [x] NPO for: speech for eval     DVT Prophylaxis:  [x] Yes: Starting lovenox           [] No rationale:      Stress Ulcer Prophylaxis: [] Yes:   [x] Not indicated    VASOPRESSORS:  [x] No    [] Yes     CENTRAL/ARTERIAL LINES:  [x] No    .      MOJICA CATHETER: [x] No        DRAINS: [x] No      Head of Bed: [x] Elevated:          [] Flat    Glucose management: [x] Not indicated, consistently less than 180 [] Sliding Scale       [] Long Acting:    Secondary Stroke PPX: [] Antiplatelet:  No              [x] Statin:    Yes Lipitor 40            Electronically signed by Hyun Gillespie MD on 12/10/2020 at 9:58 AM

## 2020-12-10 NOTE — PROGRESS NOTES
Speech Language Pathology  Facility/Department: [address]  Initial Speech/Language/Cognitive Assessment    NAME: Stacy Reynaga  : 1945   MRN: [id number]  ADMISSION DATE: 2020  ADMITTING DIAGNOSIS: has Brain mass on their problem list.    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  Local edema and slight midline shift.  Mixed    cytotoxic and vasogenic edema.  Microscopic hemorrhage.                Primary Complaint: Per chart, The patient is a 66 y.o. male who presented to the emergency department for altered mental status. Supposedly making Facebook posts last night that did not make sense. Daughter was concerned and called her sister who lives closer to patient. Neighbor spoke to patient on the phone this afternoon and noted garbled speech and the patient suddenly stopped talking. EMS was called. Found patient altered with decreased responsiveness. Also concern for left-sided facial droop. Transported via [facility] with concern for stroke. Vital stable in route with exception of mild hypertension. Glucose within normal limits. Stroke alert called upon arrival. Unclear last known well. GCS between 8 and 10 upon arrival; protecting airway. Initial NIH per stroke note of 20 for the following deficits: level of consciousness, facial palsy, left arm drift, weakness in lower extremities bilaterally, sensory loss, aphasia. Was taken immediately for CT head which demonstrated left posterior temporal lobe mass of approximately 3 cm with surrounding vasogenic edema and minimal midline shift. Neurosurgery team consulted. Plan for MRI with and without contrast. Hold steroids at this time. Concern for possible seizure-like activity was loaded with 1 g of Keppra.  Labs significant for mild hyponatremia 133 as well as mild elevation in myoglobin at 111. Also had mild troponin elevation but repeat troponin trending downward. Family contacted by ER resident. Only known medical problems include hypertension and possible dementia per ED note. No additional medical information on our records or in Care Everywhere. Upon exam patient was able to state his name. Kenyon Lanes gave mother contact number for Dr. Jacquie Rodríguez but again there was in the packet I think no is getting e mumbling incomprehensible sounds. Intermittently following commands. Pupils equal round reactive to light. Extraocular movements intact. Mild left-sided facial droop. Able to hold both arms out in front of him with no obvious pronator drift. No spontaneous movements of lower extremities but retracting to pain. Pain:  Pain Assessment  Pain Assessment: 0-10  Pain Level: 0    Assessment: Pt presents with moderate-severe receptive & expressive language deficits characterized by difficulty with stating biographical/orientation information, follow 1- and 2-step commands, answering yes/no questions, identifying common objects, and completing automatic language tasks. Pt stated \"I can't think of the word right now. \" No dysarthria or oral motor deficits noted. ST to follow up to address noted deficits. Results & recommendations reviewed with pt & reported to RN. Recommendations:  Requires SLP Intervention: Yes  Duration/Frequency of Treatment: 3-5x per week  D/C Recommendations: Further therapy recommended at discharge. Plan:   Goals:  Short-term Goals  Goal 1: Pt will state biographical/orientation information with 90% accuracy. Goal 2: Pt will complete 1- and 2-step commands with 90% accuracy. Goal 3: Pt will answer yes/no questions with 90% accuracy. Goal 4: Pt will identify common objects with 90% accuracy. Goal 5: Pt will compelte automatic language tasks with 90% accuracy.    Patient/family involved in developing goals and treatment plan: yes    Subjective:  General  Chart Reviewed: Yes  Family / Caregiver Present: No     Vision  Vision: Within Functional Limits  Hearing  Hearing: Exceptions to [facility]  Hearing Exceptions: Hard of hearing/hearing concerns     Objective:     Oral/Motor  Oral Motor: Within functional limits    Auditory Comprehension  Comprehension: Exceptions  Yes/No Questions: Severe(1/3)  One Step Basic Commands: Moderate(3/5)  Two Step Basic Commands: Severe(0/2)  Common Objects: Severe(0/3)    Expression  Primary Mode of Expression: Verbal    Verbal Expression  Verbal Expression: Exceptions to functional limits  Automatic Speech: Severe(not able to count 1-10 despite model, for MATT, stated \"monday monday monday\")  Confrontation: Severe(0/3)  Divergent: Severe(0 concrete category members in 30 seconds)  Responsive: Severe(0/3)    Motor Speech  Motor Speech: Within Functional Limits    Cognition:      Orientation  Overall Orientation Status: Impaired  Orientation Level: Oriented to person;Disoriented to place; Disoriented to time;Disoriented to situation       Prognosis:  Speech Therapy Prognosis  Prognosis: Fair  Individuals consulted  Consulted and agree with results and recommendations: Patient    Education:  Patient Education: yes  Patient Education Response: Verbalizes understanding          Therapy Time:   Individual Concurrent Group Co-treatment   Time In 1108         Time Out 1119         Minutes 6                 Michael Ronquillo M.S. [id number] [facility]    12/10/2020 12:05 PM

## 2020-12-10 NOTE — PROGRESS NOTES
Physical Therapy    Facility/Department: [address]  Initial Assessment    NAME: Scott Guo  : 1945  MRN: [id number]    Date of Service: 12/10/2020  Chief Complaint   Patient presents with    Cerebrovascular Accident       Discharge Recommendations:    Further therapy recommended at discharge. PT Equipment Recommendations  Equipment Needed: (Continue to assess pending progression of mobility- pt currently unsafe to perform functional mobility unassisted)    Assessment   Body structures, Functions, Activity limitations: Decreased functional mobility ; Decreased posture;Decreased endurance;Decreased ROM; Decreased strength;Decreased safe awareness;Decreased balance  Assessment: Pt ambulated 2ft w/ RW Vj, modA to perform bed mobility. Pt is a high fall risk due to significant confusion and decreased cognition. Pt is expected to require continued skilled physical therapy to address functional mobility deficits and maximize independence. Prognosis: Good  Decision Making: Medium Complexity  PT Education: Goals;PT Role;Plan of Care;General Safety  REQUIRES PT FOLLOW UP: Yes  Activity Tolerance  Activity Tolerance: Patient limited by endurance; Patient limited by cognitive status       Patient Diagnosis(es): The primary encounter diagnosis was Brain mass. A diagnosis of Светлана coma scale total score 3-8, in the field (EMT or ambulance) [facility]) was also pertinent to this visit. has no past medical history on file. has no past surgical history on file.     Restrictions  Restrictions/Precautions  Restrictions/Precautions: Fall Risk  Required Braces or Orthoses?: No  Position Activity Restriction  Other position/activity restrictions: CTLS clear, LTME  Vision/Hearing  Vision: Within Functional Limits  Hearing: Exceptions to [facility]  Hearing Exceptions: Hard of hearing/hearing concerns     Subjective  General  Patient assessed for rehabilitation services?: Yes  Response To Previous Treatment: Not applicable  Family / Caregiver Present: No  Follows Commands: Impaired(pt intermittently)  Subjective  Subjective: RN and pt in agreement for PT eval; pt supine in bed upon PT arrival, pt on 2.5L NC. Pt pleasantly confused throughout session. Pain Screening  Patient Currently in Pain: Denies  Vital Signs  Patient Currently in Pain: Denies       Orientation  Orientation  Overall Orientation Status: Impaired  Orientation Level: Oriented to person;Disoriented to place; Disoriented to time;Disoriented to situation  Social/Functional History  Social/Functional History  Lives With: Alone  Type of Home: House  Home Layout: One level  Home Access: Level entry  Bathroom Shower/Tub: Walk-in shower  Bathroom Toilet: Standard  Bathroom Equipment: Built-in shower seat, Grab bars in [place]: Rolling walker, Cane(pt reported no use of DME at baseline)  ADL Assistance: Independent  Homemaking Assistance: Independent  Homemaking Responsibilities: Yes  Meal Prep Responsibility: Primary  Laundry Responsibility: Primary  Cleaning Responsibility: Primary  Ambulation Assistance: Independent  Transfer Assistance: Independent  Active : Yes  Mode of Transportation: Car  Occupation: Retired  Additional Comments: above information obtained from pt but pt questionable historian due to confusion  Cognition   Cognition  Overall Cognitive Status: Exceptions  Arousal/Alertness: Delayed responses to stimuli  Following Commands: Follows one step commands with repetition; Follows one step commands with increased time  Attention Span: Attends with cues to redirect  Safety Judgement: Decreased awareness of need for assistance;Decreased awareness of need for safety  Insights: Decreased awareness of deficits  Initiation: Requires cues for all  Sequencing: Requires cues for all    Objective  Joint Mobility  Spine: WFL  ROM RLE: PROM WFL- difficulty assessing AROM due to following commands  ROM LLE: PROM WFL- difficulty assessing AROM Training, Patient/Caregiver Education & Training, ROM, Balance Training, Gait Training, Home Exercise Program, Functional Mobility Training, Stair training, Safety Education & Training  Safety Devices  Type of devices: Left in bed, Bed alarm in place, Call light within reach, Gait belt, Nurse notified, Patient at risk for falls  Restraints  Initially in place: No  AM-PAC Score     AM-PAC Inpatient Mobility without Stair Climbing Raw Score : 13 (12/10/20 1426)  AM-PAC Inpatient without Stair Climbing T-Scale Score : 38.96 (12/10/20 1426)  Mobility Inpatient CMS 0-100% Score: 58.44 (12/10/20 1426)  Mobility Inpatient without Stair CMS G-Code Modifier : CK (12/10/20 1426)       Goals  Short term goals  Time Frame for Short term goals: 14  Short term goal 1: Pt to perform bed mobility CGA  Short term goal 2: Pt to demonstrate functional transfers CGA  Short term goal 3: Ambulate 100ft w/ least restrictive AD CGA  Short term goal 4: Demonstrate standing balance of good - to decrease fall risk  Patient Goals   Patient goals :  To go home       Therapy Time   Individual Concurrent Group Co-treatment   Time In 7730         Time Out 0920         Minutes 27         Timed Code Treatment Minutes: 2[address] Antonio Green PT

## 2020-12-10 NOTE — PROGRESS NOTES
Occupational Therapy   Occupational Therapy Initial Assessment  Date: 12/10/2020   Patient Name: Samantha Felix  MRN: [id number]     : 1945    Date of Service: 12/10/2020    Discharge Recommendations:    Further therapy recommended at discharge. Assessment   Performance deficits / Impairments: Decreased functional mobility ; Decreased safe awareness;Decreased ADL status; Decreased endurance;Decreased high-level IADLs;Decreased cognition;Decreased strength  Assessment: pt would be unsafe to return to home alone at this time due to required increased physical assistance verbal cuing. pt would benefit from further therap giselle discharge in order to increase safety and independence. Treatment Diagnosis: stroke like symptoms  Prognosis: Good  Decision Making: Medium Complexity  Patient Education: pt ed on POC, purpose ofeval, importance of movement, safety during functional transfers/functional mobility, benefits of therapy. fair return  REQUIRES OT FOLLOW UP: Yes  Activity Tolerance  Activity Tolerance: Patient Tolerated treatment well;Treatment limited secondary to decreased cognition  Safety Devices  Safety Devices in place: Yes  Type of devices: Gait belt;Patient at risk for falls;Call light within reach; Bed alarm in place; Left in bed  Restraints  Initially in place: No           Patient Diagnosis(es): The primary encounter diagnosis was Brain mass. A diagnosis of Bryant coma scale total score 3-8, in the field (EMT or ambulance) [facility]) was also pertinent to this visit. has no past medical history on file. has no past surgical history on file.     Treatment Diagnosis: stroke like symptoms      Restrictions  Restrictions/Precautions  Restrictions/Precautions: Fall Risk  Required Braces or Orthoses?: No  Position Activity Restriction  Other position/activity restrictions: CTLS clear, LTME    Subjective   General  Patient assessed for rehabilitation services?: Yes  Family / Caregiver Present: No  Diagnosis: stroke like symptoms  General Comment  Comments: RN ok'd for therapy this morning. Pt agreeable to participate in session and cooperative/pleasant throughout, although confused  Patient Currently in Pain: Denies    Social/Functional History  Social/Functional History  Lives With: Alone  Type of Home: House  Home Layout: One level  Home Access: Level entry  Bathroom Shower/Tub: Walk-in shower  Bathroom Toilet: Standard  Bathroom Equipment: Built-in shower seat, Grab bars in [address] [place]: Rolling walker, Cane(pt reported no use of DME at baseline)  ADL Assistance: Independent  Homemaking Assistance: Independent  Homemaking Responsibilities: Yes  Meal Prep Responsibility: Primary  Laundry Responsibility: Primary  Cleaning Responsibility: Primary  Ambulation Assistance: Independent  Transfer Assistance: Independent  Active : Yes  Mode of Transportation: Car  Occupation: Retired  Additional Comments: above information obtained from pt but pt questionable historian due to confusion       Objective   Vision: Within Functional Limits  Hearing: Exceptions to [facility]  Hearing Exceptions: Hard of hearing/hearing concerns    Orientation  Overall Orientation Status: Impaired  Orientation Level: Oriented to person;Disoriented to place; Disoriented to time;Disoriented to situation     Balance  Sitting Balance: Stand by assistance(~6 minutes on eOB)  Standing Balance: Minimal assistance(with RW)  Standing Balance  Time: ~2 minutes  Activity: pt took 2 side steps toward [facility]  Comment: pt required min A for walker navigation  ADL  Feeding: Supervision  Grooming: Minimal assistance  UE Bathing: Minimal assistance; Increased time to complete  LE Bathing: Maximum assistance; Increased time to complete  UE Dressing: Minimal assistance; Increased time to complete  LE Dressing: Maximum assistance; Increased time to complete  Toileting: Increased time to complete; Moderate assistance  Additional Comments: pt using urinal upon arrival and required assistance for placement and keeping urinal in place  Tone RUE  RUE Tone: Normotonic  Tone LUE  LUE Tone: Normotonic  Coordination  Movements Are Fluid And Coordinated: No  Coordination and Movement description: Decreased speed;Right UE;Left UE     Bed mobility  Supine to Sit: Moderate assistance  Sit to Supine: Minimal assistance  Scooting: Minimal assistance  Transfers  Sit to stand: Minimal assistance;2 Person assistance  Stand to sit: Minimal assistance;2 Person assistance  Transfer Comments: with RW     Cognition  Overall Cognitive Status: Exceptions  Arousal/Alertness: Delayed responses to stimuli  Following Commands: Follows one step commands with repetition; Follows one step commands with increased time  Attention Span: Attends with cues to redirect  Safety Judgement: Decreased awareness of need for assistance;Decreased awareness of need for safety  Insights: Decreased awareness of deficits  Initiation: Requires cues for all  Sequencing: Requires cues for all        Sensation  Overall Sensation Status: WFL        LUE AROM (degrees)  LUE AROM : Exceptions  L Shoulder Flexion 0-180: 0-80  L Elbow Flexion 0-145: WFL  L Wrist Flexion 0-80: WFL  L Wrist Extension 0-70: WFL  Left Hand AROM (degrees)  Left Hand AROM: WFL  RUE AROM (degrees)  RUE AROM : Exceptions  R Shoulder Flexion 0-180: 0-90  R Elbow Flexion 0-145:  Wfl  R Wrist Flexion 0-80: wFL  R Wrist Extension 0-70: WFL  Right Hand AROM (degrees)  Right Hand AROM: WFL  LUE Strength  Gross LUE Strength: Exceptions to [facility]  L Hand General: 4-/5  RUE Strength  Gross RUE Strength: Exceptions to [facility]  R Hand General: 4-/5         Plan   Plan  Times per week: 3-5x/wk    AM-PAC Score        AM-PAC Inpatient Daily Activity Raw Score: 15 (12/10/20 1244)  AM-PAC Inpatient ADL T-Scale Score : 34.69 (12/10/20 1244)  ADL Inpatient CMS 0-100% Score: 56.46 (12/10/20 1244)  ADL Inpatient CMS G-Code Modifier : CK (12/10/20 1244)    Goals  Short term goals  Time Frame for Short term goals: pt will, by discharge  Short term goal 1: maintain attention to task for ~6 minutes with 2-3 verbal cues for redirection  Short term goal 2: complete LB ADLs with mod A, set up and AE, as needed  Short term goal 3: complete UB ADLs and grooming tasks with SBA and set up  Short term goal 4: increase activity tolerance to 20+ minutes in order to participate in daily tasks  Short term goal 5: dem SBA during functional transfers/functional mobility with LRD       Therapy Time   Individual Concurrent Group Co-treatment   Time In 0853         Time Out 0918         Minutes 25      co-eval with PT    Timed Code Treatment Minutes: Lisa 4145, OTR/L

## 2020-12-10 NOTE — PROCEDURES
LONG-TERM EEG-VIDEO [address]    Patient: Paul Moise  Age: 66 y.o. MRN: [id number]    Referring Physician: No ref. provider found  History: The patient is a 66 y.o. male who presented breakthrough seizure/encephalopathy. This long-term video-EEG monitoring study was performed to determine the nature of the patient's clinical events. The patient is on neuroactive medications. Pual Moise   Current Facility-Administered Medications   Medication Dose Route Frequency Provider Last Rate Last Dose    0.9 % sodium chloride infusion   Intravenous Continuous Emmy Baltic, DO        labetalol (NORMODYNE;TRANDATE) injection 10 mg  10 mg Intravenous Q10 Min PRN Emmy Anderson, DO        levetiracetam (KEPPRA) 500 mg/100 mL IVPB  500 mg Intravenous Q12H Emmy Baltic, DO        famotidine (PEPCID) injection 20 mg  20 mg Intravenous BID Emmy Anderson, DO        atorvastatin (LIPITOR) tablet 40 mg  40 mg Oral Nightly Emmy Anderson, DO        sodium chloride flush 0.9 % injection 10 mL  10 mL Intravenous PRN Jessica Shea MD         Technical Description: This is a 21-channel digital EEG recording with time-locked video. Electrodes were placed in accordance with the 10-20 International System of Electrode Placement. Single lead EKG monitoring was included. Baseline EEG Recording:  A formal baseline EEG recording was not obtained. Day 1 - 12/9/20, starting at 19:39    Interictal EEG Samples: The background activity over the right hemisphere consisted of 8 to 9 Hz of alpha activity of 25 to 40 µV. Activity over the left hemisphere was less well moderated and consisted of 7 to 8 Hz of activity. There was continuous left frontotemporal polymorphic 2 to 3 Hz of delta slowing of 40 to 50 µV. Occasional left anterior temporal sharp waves at [facility] were seen.   During behavioral sleep, sleep spindles were

## 2020-12-11 LAB
ANION GAP SERPL CALCULATED.3IONS-SCNC: 9 MMOL/L (ref 9–17)
BUN BLDV-MCNC: 18 MG/DL (ref 8–23)
BUN/CREAT BLD: ABNORMAL (ref 9–20)
CALCIUM SERPL-MCNC: 8.6 MG/DL (ref 8.6–10.4)
CHLORIDE BLD-SCNC: 107 MMOL/L (ref 98–107)
CO2: 21 MMOL/L (ref 20–31)
CREAT SERPL-MCNC: 0.84 MG/DL (ref 0.7–1.2)
GFR AFRICAN AMERICAN: >60 ML/MIN
GFR NON-AFRICAN AMERICAN: >60 ML/MIN
GFR SERPL CREATININE-BSD FRML MDRD: ABNORMAL ML/MIN/{1.73_M2}
GFR SERPL CREATININE-BSD FRML MDRD: ABNORMAL ML/MIN/{1.73_M2}
GLUCOSE BLD-MCNC: 137 MG/DL (ref 70–99)
GLUCOSE BLD-MCNC: 165 MG/DL (ref 75–110)
GLUCOSE BLD-MCNC: 171 MG/DL (ref 75–110)
HCT VFR BLD CALC: 49.5 % (ref 40.7–50.3)
HEMOGLOBIN: 16.1 G/DL (ref 13–17)
LV EF: 55 %
LVEF MODALITY: NORMAL
MAGNESIUM: 2.3 MG/DL (ref 1.6–2.6)
MCH RBC QN AUTO: 31.3 PG (ref 25.2–33.5)
MCHC RBC AUTO-ENTMCNC: 32.5 G/DL (ref 28.4–34.8)
MCV RBC AUTO: 96.3 FL (ref 82.6–102.9)
NRBC AUTOMATED: 0 PER 100 WBC
PDW BLD-RTO: 12.5 % (ref 11.8–14.4)
PHOSPHORUS: 2.5 MG/DL (ref 2.5–4.5)
PLATELET # BLD: 165 K/UL (ref 138–453)
PMV BLD AUTO: 9.9 FL (ref 8.1–13.5)
POTASSIUM SERPL-SCNC: 4.2 MMOL/L (ref 3.7–5.3)
RBC # BLD: 5.14 M/UL (ref 4.21–5.77)
SODIUM BLD-SCNC: 137 MMOL/L (ref 135–144)
WBC # BLD: 5.1 K/UL (ref 3.5–11.3)

## 2020-12-11 PROCEDURE — 85027 COMPLETE CBC AUTOMATED: CPT

## 2020-12-11 PROCEDURE — 99221 1ST HOSP IP/OBS SF/LOW 40: CPT | Performed by: PHYSICAL MEDICINE & REHABILITATION

## 2020-12-11 PROCEDURE — 6360000002 HC RX W HCPCS: Performed by: STUDENT IN AN ORGANIZED HEALTH CARE EDUCATION/TRAINING PROGRAM

## 2020-12-11 PROCEDURE — 97110 THERAPEUTIC EXERCISES: CPT

## 2020-12-11 PROCEDURE — 6370000000 HC RX 637 (ALT 250 FOR IP): Performed by: STUDENT IN AN ORGANIZED HEALTH CARE EDUCATION/TRAINING PROGRAM

## 2020-12-11 PROCEDURE — 99291 CRITICAL CARE FIRST HOUR: CPT | Performed by: NEUROLOGICAL SURGERY

## 2020-12-11 PROCEDURE — 84100 ASSAY OF PHOSPHORUS: CPT

## 2020-12-11 PROCEDURE — 99233 SBSQ HOSP IP/OBS HIGH 50: CPT | Performed by: PSYCHIATRY & NEUROLOGY

## 2020-12-11 PROCEDURE — 82947 ASSAY GLUCOSE BLOOD QUANT: CPT

## 2020-12-11 PROCEDURE — 97116 GAIT TRAINING THERAPY: CPT

## 2020-12-11 PROCEDURE — 2500000003 HC RX 250 WO HCPCS: Performed by: STUDENT IN AN ORGANIZED HEALTH CARE EDUCATION/TRAINING PROGRAM

## 2020-12-11 PROCEDURE — 95714 VEEG EA 12-26 HR UNMNTR: CPT

## 2020-12-11 PROCEDURE — 36415 COLL VENOUS BLD VENIPUNCTURE: CPT

## 2020-12-11 PROCEDURE — 2060000000 HC ICU INTERMEDIATE R&B

## 2020-12-11 PROCEDURE — 80048 BASIC METABOLIC PNL TOTAL CA: CPT

## 2020-12-11 PROCEDURE — 93306 TTE W/DOPPLER COMPLETE: CPT

## 2020-12-11 PROCEDURE — APPSS30 APP SPLIT SHARED TIME 16-30 MINUTES: Performed by: REGISTERED NURSE

## 2020-12-11 PROCEDURE — 97535 SELF CARE MNGMENT TRAINING: CPT

## 2020-12-11 PROCEDURE — 97530 THERAPEUTIC ACTIVITIES: CPT

## 2020-12-11 PROCEDURE — 2580000003 HC RX 258: Performed by: STUDENT IN AN ORGANIZED HEALTH CARE EDUCATION/TRAINING PROGRAM

## 2020-12-11 PROCEDURE — 83735 ASSAY OF MAGNESIUM: CPT

## 2020-12-11 PROCEDURE — 95720 EEG PHY/QHP EA INCR W/VEEG: CPT | Performed by: PSYCHIATRY & NEUROLOGY

## 2020-12-11 RX ORDER — DEXTROSE MONOHYDRATE 50 MG/ML
100 INJECTION, SOLUTION INTRAVENOUS PRN
Status: DISCONTINUED | OUTPATIENT
Start: 2020-12-11 | End: 2020-12-28 | Stop reason: HOSPADM

## 2020-12-11 RX ORDER — DEXTROSE MONOHYDRATE 25 G/50ML
12.5 INJECTION, SOLUTION INTRAVENOUS PRN
Status: DISCONTINUED | OUTPATIENT
Start: 2020-12-11 | End: 2020-12-28 | Stop reason: HOSPADM

## 2020-12-11 RX ORDER — NICOTINE POLACRILEX 4 MG
15 LOZENGE BUCCAL PRN
Status: DISCONTINUED | OUTPATIENT
Start: 2020-12-11 | End: 2020-12-28 | Stop reason: HOSPADM

## 2020-12-11 RX ADMIN — LEVETIRACETAM 500 MG: 5 INJECTION INTRAVENOUS at 02:42

## 2020-12-11 RX ADMIN — FAMOTIDINE 20 MG: 10 INJECTION INTRAVENOUS at 20:59

## 2020-12-11 RX ADMIN — ENOXAPARIN SODIUM 30 MG: 100 INJECTION SUBCUTANEOUS at 20:59

## 2020-12-11 RX ADMIN — FAMOTIDINE 20 MG: 10 INJECTION INTRAVENOUS at 09:23

## 2020-12-11 RX ADMIN — DEXAMETHASONE SODIUM PHOSPHATE 4 MG: 4 INJECTION, SOLUTION INTRAMUSCULAR; INTRAVENOUS at 10:17

## 2020-12-11 RX ADMIN — LEVETIRACETAM 500 MG: 5 INJECTION INTRAVENOUS at 13:38

## 2020-12-11 RX ADMIN — SODIUM CHLORIDE: 9 INJECTION, SOLUTION INTRAVENOUS at 13:38

## 2020-12-11 RX ADMIN — POTASSIUM BICARBONATE 40 MEQ: 782 TABLET, EFFERVESCENT ORAL at 09:24

## 2020-12-11 RX ADMIN — DEXAMETHASONE SODIUM PHOSPHATE 4 MG: 4 INJECTION, SOLUTION INTRAMUSCULAR; INTRAVENOUS at 18:17

## 2020-12-11 RX ADMIN — DEXAMETHASONE SODIUM PHOSPHATE 4 MG: 4 INJECTION, SOLUTION INTRAMUSCULAR; INTRAVENOUS at 23:38

## 2020-12-11 RX ADMIN — DEXAMETHASONE SODIUM PHOSPHATE 4 MG: 4 INJECTION, SOLUTION INTRAMUSCULAR; INTRAVENOUS at 00:18

## 2020-12-11 RX ADMIN — DESMOPRESSIN ACETATE 40 MG: 0.2 TABLET ORAL at 20:59

## 2020-12-11 RX ADMIN — DEXAMETHASONE SODIUM PHOSPHATE 4 MG: 4 INJECTION, SOLUTION INTRAMUSCULAR; INTRAVENOUS at 05:31

## 2020-12-11 RX ADMIN — ENOXAPARIN SODIUM 30 MG: 100 INJECTION SUBCUTANEOUS at 09:23

## 2020-12-11 RX ADMIN — INSULIN LISPRO 1 UNITS: 100 INJECTION, SOLUTION INTRAVENOUS; SUBCUTANEOUS at 18:17

## 2020-12-11 ASSESSMENT — PAIN SCALES - GENERAL
PAINLEVEL_OUTOF10: 0
PAINLEVEL_OUTOF10: 0

## 2020-12-11 NOTE — PROGRESS NOTES
Daily Progress Note  Neuro Critical Care    Patient Name: Ella Stein  Patient : 1945  Room/Bed: 0522/0522-01  Code Status: Full code  Allergies: No Known Allergies    CHIEF COMPLAINT:        Altered mental status, brain mass      INTERVAL HISTORY    Initial Presentation (Admitted 20): The patient is a 66 y.o. male who presented to the emergency department for altered mental status. Supposedly making Facebook posts last night that did not make sense. Daughter was concerned and called her sister who lives closer to patient. Neighbor spoke to patient on the phone this afternoon and noted garbled speech and the patient suddenly stopped talking. EMS was called. Found patient altered with decreased responsiveness. Also concern for left-sided facial droop. Transported via [facility] with concern for stroke. Vital stable in route with exception of mild hypertension. Glucose within normal limits. Stroke alert called upon arrival.  Unclear last known well. GCS between 8 and 10 upon arrival; protecting airway. Initial NIH per stroke note of 20 for the following deficits: level of consciousness, facial palsy, left arm drift, weakness in lower extremities bilaterally, sensory loss, aphasia. Was taken immediately for CT head which demonstrated left posterior temporal lobe mass of approximately 3 cm with surrounding vasogenic edema and minimal midline shift. Neurosurgery team consulted. Plan for MRI with and without contrast.  Hold steroids at this time. Concern for possible seizure-like activity was loaded with 1 g of Keppra. Labs significant for mild hyponatremia 133 as well as mild elevation in myoglobin at 111. Also had mild troponin elevation but repeat troponin trending downward. Family contacted by ER resident. Only known medical problems include hypertension and possible dementia per ED note.   No additional medical information on our records or in Care Everywhere.     Upon exam patient was able to state his name. Otherwis mumbling incomprehensible sounds. Intermittently following commands. Pupils equal round reactive to light. Extraocular movements intact. Mild left-sided facial droop. Able to hold both arms out in front of him with no obvious pronator drift. No spontaneous movements of lower extremities but retracting to pain. 12/10:  No acute events overnight. More alert this morning. A/O to person and can state that he is in hospital. Speaking in short sentences with mild dysarthria and broken speech. Persistent facial droop. Following commands. No pronator drift. Mild coughing with bedside swallow; speech to perform formal swallow study later today. Last 24 hrs  No acute events overnight. No BM overnight. Good UOP.     CURRENT MEDICATIONS:  SCHEDULED MEDICATIONS:   insulin lispro  0-6 Units Subcutaneous TID WC    enoxaparin  30 mg Subcutaneous BID    potassium bicarb-citric acid  40 mEq Oral Daily    dexamethasone  4 mg Intravenous Q6H    levetiracetam  500 mg Intravenous Q12H    famotidine (PEPCID) injection  20 mg Intravenous BID    atorvastatin  40 mg Oral Nightly     CONTINUOUS INFUSIONS:   dextrose      sodium chloride 75 mL/hr at 20 1338     PRN MEDICATIONS:   glucose, dextrose, glucagon (rDNA), dextrose, labetalol, sodium chloride flush    VITALS:  Temperature Range: Temp: 97.9 °F (36.6 °C) Temp  Av.8 °F (36.6 °C)  Min: 97.8 °F (36.6 °C)  Max: 98 °F (36.7 °C)  BP Range: Systolic (75GZH), OKD:625 , Min:94 , BFC:783     Diastolic (02QFV), WUC:66, Min:54, Max:91    Pulse Range: Pulse  Av.8  Min: 56  Max: 98  Respiration Range: Resp  Av.2  Min: 14  Max: 23  Current Pulse Ox: SpO2: 98 %  24HR Pulse Ox Range: SpO2  Av.8 %  Min: 94 %  Max: 100 %  Patient Vitals for the past 12 hrs:   BP Temp Temp src Pulse Resp SpO2   20 1400 -- -- -- 97 21 98 %   20 1300 (!) 128/57 -- -- 79 19 99 %   20 1200 (!) 135/59 97.9 °F (36.6 °C) Oral 82 20 100 %   12/11/20 1100 (!) 157/91 -- -- 98 -- --   12/11/20 1000 138/67 -- -- 86 17 97 %   12/11/20 0900 (!) 116/59 -- -- 64 16 96 %   12/11/20 0800 137/78 97.8 °F (36.6 °C) Oral 75 14 97 %   12/11/20 0743 -- 97.8 °F (36.6 °C) Oral -- -- --   12/11/20 0700 137/79 -- -- 69 19 98 %   12/11/20 0600 124/75 -- -- 65 18 97 %   12/11/20 0500 138/64 -- -- 67 18 96 %   12/11/20 0400 138/74 97.8 °F (36.6 °C) -- 67 15 97 %   12/11/20 0300 (!) 94/54 -- -- 56 22 95 %     Estimated body mass index is 25.68 kg/m² as calculated from the following:    Height as of this encounter: 6' 2\" (1.88 m). Weight as of this encounter: 200 lb (90.7 kg).  []<16 Severe malnutrition  []16-16.99 Moderate malnutrition  []17-18.49 Mild malnutrition  []18.5-24.9 Normal  [x]25-29.9 Overweight (not obese)  []30-34.9 Obese class 1 (Low Risk)  []35-39.9 Obese class 2 (Moderate Risk)  []?40 Obese class 3 (High Risk)    RECENT LABS:   Lab Results   Component Value Date    WBC 5.1 12/11/2020    HGB 16.1 12/11/2020    HCT 49.5 12/11/2020     12/11/2020    HDL 37 (L) 12/09/2020    ALT 28 12/09/2020    AST 23 12/09/2020     12/11/2020    K 4.2 12/11/2020     12/11/2020    CREATININE 0.84 12/11/2020    BUN 18 12/11/2020    CO2 21 12/11/2020    TSH 2.76 12/09/2020    INR 1.0 12/09/2020    LABA1C 5.5 12/10/2020     24 HOUR INTAKE/OUTPUT:    Intake/Output Summary (Last 24 hours) at 12/11/2020 1426  Last data filed at 12/11/2020 0900  Gross per 24 hour   Intake 1999 ml   Output 1750 ml   Net 249 ml       IMAGING:   MRI BRAIN W WO CONTRAST   Final Result   Ring-enhancing left temporal lobe mass consistent with abscess versus primary   or secondary neoplasm. Local edema and slight midline shift. Mixed   cytotoxic and vasogenic edema. Microscopic hemorrhage. CT CHEST ABDOMEN PELVIS WO CONTRAST   Final Result   No acute disease. Incidental findings as above.          CT LUMBAR SPINE TRAUMA RECONSTRUCTION   Final Result   CT thoracic spine: Degenerative and degenerative disc disease. No acute   fracture or traumatic malalignment. CT lumbar spine: No acute fracture. Patient has an anterolisthesis L5 upon   S1 and bilateral chronic spondylosis. Multilevel degenerative and   degenerative disc changes are noted as above. Multilevel disc bulges. Exiting L5-S1 nerve roots are elevated and borderline impinged. CT THORACIC SPINE TRAUMA RECONSTRUCTION   Final Result   CT thoracic spine: Degenerative and degenerative disc disease. No acute   fracture or traumatic malalignment. CT lumbar spine: No acute fracture. Patient has an anterolisthesis L5 upon   S1 and bilateral chronic spondylosis. Multilevel degenerative and   degenerative disc changes are noted as above. Multilevel disc bulges. Exiting L5-S1 nerve roots are elevated and borderline impinged. CT CERVICAL SPINE WO CONTRAST   Final Result   No acute abnormality of the cervical spine. XR CHEST PORTABLE   Final Result   No acute cardiopulmonary abnormality. CT BRAIN PERFUSION   Final Result   1. No perfusion mismatch. 2. Left temporal lobe mass demonstrates matching increased cerebral blood   flow cerebral blood volume and mean transit time. CTA HEAD NECK W CONTRAST   Final Result   1. Noncontrast CT of the head demonstrates presence of a 3 cm left posterior   temporal lobe mass with surrounding vasogenic edema. Minimal midline shift   from left to right is noted. 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 MRI of the brain with contrast is recommended. 2. Unremarkable CTA of the head and neck. The findings were sent to the Radiology Results [address] at 2:15   pm on 12/9/2020to be communicated to a licensed caregiver. CT HEAD WO CONTRAST   Final Result   1. Noncontrast CT of the head demonstrates presence of a 3 cm left posterior   temporal lobe mass with surrounding vasogenic edema. Minimal midline shift   from left to right is noted. 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 MRI of the brain with contrast is recommended. 2. Unremarkable CTA of the head and neck. The findings were sent to the Radiology Results [address] at 2:15   pm on 12/9/2020to be communicated to a licensed caregiver. Labs and Images reviewed with:  [x] Dr. Prem Granados    [] Dr. Perez Araujo  [] Dr. Jaziel Hamlin  [] There are no new interval images to review. PHYSICAL EXAM       CONSTITUTIONAL:  Well developed. alert. GCS 15. alert and oriented x 2, in no acute distress. Nontoxic. Dysarthric   HEAD:  normocephalic, atraumatic    EYES:  PERRLA, EOMI.   ENT:  moist mucous membranes   NECK:  supple, symmetric   LUNGS:  Equal air entry bilaterally   CARDIOVASCULAR:  normal s1 / s2, RRR, distal pulses intact   ABDOMEN:  Soft, no rigidity   NEUROLOGIC:  Mental Status:  Not oriented to person, Drowsy but wakes to verbal stimuli. GCS 15. Cranial Nerves:    II: Visual acuity:  Responds to visual threat  III: Pupils:  equal, round, reactive to light  III,IV,VI: Extra Ocular Movements: intact  VII: Facial strength: abnormal mild left facial droop  IX: Palate:  intact  XII: Tongue movement:  normal    Motor Exam:    Drift:  absent  Tone:  normal    4/5 strength bilateral UEs  No spont movements of lower extremities, does  Not hit bed    Sensory:    Touch:    Right Upper Extremity:  normal  Left Upper Extremity:  normal  Right Lower Extremity:  abnormal - diminished sensation  Left Lower Extremity:  abnormal - diminished sensatio    Deep Tendon Reflexes:    Right Knee:  1+  Left Knee:  1+    Plantar Response:  Right:  equivocal  Left:  equivocal    Clonus:  absent         ASSESSMENT AND PLAN:       This is a 66 y.o. male with altered mental status. Initially stroke alert due to dysarthria and concern for unilateral deficits.  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 For any changes in exam or patient status please contact Neuro Critical Care.       Fabian Contreras DO  Neuro Critical Care  Pager [phone]  12/11/2020     2:26 PM

## 2020-12-11 NOTE — PROCEDURES
LONG-TERM EEG-VIDEO [address]    Patient: Lawanda Stephenson  Age: 66 y.o. MRN: [id number]    Referring Physician: No ref. provider found  History: The patient is a 66 y.o. male who presented breakthrough seizure/encephalopathy. This long-term video-EEG monitoring study was performed to determine the nature of the patient's clinical events. The patient is on neuroactive medications. Lawanda Stephenson   Current Facility-Administered Medications   Medication Dose Route Frequency Provider Last Rate Last Dose    enoxaparin (LOVENOX) injection 30 mg  30 mg Subcutaneous BID [place] Bee, DO   30 mg at 12/11/20 2793    potassium bicarb-citric acid (EFFER-K) effervescent tablet 40 mEq  40 mEq Oral Daily [place] Bee, DO   40 mEq at 12/11/20 8145    dexamethasone (DECADRON) injection 4 mg  4 mg Intravenous Q6H [facility], DO   4 mg at 12/11/20 0531    0.9 % sodium chloride infusion   Intravenous Continuous [place] [facility],  mL/hr at 12/09/20 2051      labetalol (NORMODYNE;TRANDATE) injection 10 mg  10 mg Intravenous Q10 Min PRN [facility], DO        levetiracetam (KEPPRA) 500 mg/100 mL IVPB  500 mg Intravenous Q12H [place] Bee, DO   Stopped at 12/11/20 0257    famotidine (PEPCID) injection 20 mg  20 mg Intravenous BID Deirdre Bee, DO   20 mg at 12/11/20 6029    atorvastatin (LIPITOR) tablet 40 mg  40 mg Oral Nightly Deirdre Bee, DO   40 mg at 12/10/20 2059    sodium chloride flush 0.9 % injection 10 mL  10 mL Intravenous PRN Delmy Beebe MD   10 mL at 12/10/20 2100     Technical Description: This is a 21-channel digital EEG recording with time-locked video. Electrodes were placed in accordance with the 10-20 International System of Electrode Placement. Single lead EKG monitoring was included. Baseline EEG Recording:  A formal baseline EEG recording was not obtained. Day 1 - 12/9/20, starting at 19:39    Interictal EEG Samples: The background activity over the right hemisphere consisted of 8 to 9 Hz of alpha activity of 25 to 40 µV. Activity over the left hemisphere was less well moderated and consisted of 7 to 8 Hz of activity. There was continuous left frontotemporal polymorphic 2 to 3 Hz of delta slowing of 40 to 50 µV. Occasional left anterior temporal sharp waves at [facility] were seen. During behavioral sleep, sleep spindles were seen better formed over the right hemisphere. . The EKG channel revealed no abnormalities. Ictal EEG Recording / Patient Events: During this period the patient had no events or seizures. Summary: During this day of recording no events were recorded. The interictal EEG was abnormal due to continuous left frontotemporal polymorphic delta slowing suggesting underlying structural defect. Occasional left anterior temporal sharp waves at F7 conferred an increased risk for focal onset seizures. Monitoring was continued in order to record the patient's typical events. The EKG channel revealed no abnormalities. Day 2 - 12/10/20    Interictal EEG Samples: Interictal EEG was unchanged from yesterday. Ictal EEG Recording / Patient Events: During this period the patient had no events or seizures. Summary: During this day of recording no events were recorded. The interictal EEG was abnormal due to continuous left frontotemporal polymorphic delta slowing suggesting underlying structural defect. Rare left anterior temporal sharp waves at F7 conferred an increased risk for focal onset seizures. Monitoring was continued in order to record the patient's typical events. The EKG channel revealed no abnormalities. Day 3 - 12/11/20, reviewed through end of the recording at 11 am    Interictal EEG Samples: Interictal EEG was unchanged from yesterday.     Ictal EEG Recording / Patient Events: During this period the patient had no events or seizures. Summary: During this day of recording no events were recorded. The interictal EEG was abnormal due to continuous left frontotemporal polymorphic delta slowing suggesting underlying structural defect. Rare left anterior temporal sharp waves at F7 conferred an increased risk for focal onset seizures. Monitoring was discontinued. The EKG channel revealed no abnormalities. Summary and behavior: The interictal EEG was abnormal. Continuous left frontotemporal polymorphic delta slowing was seen. Rare left anterior temporal sharp waves at [facility] were seen. Conclusion: This is an abnormal 3-day video EEG recording. Continuous left frontotemporal polymorphic delta slowing suggested underlying structural defect. Rare left anterior temporal sharp waves at F7 conferred an increased risk for focal onset seizures.      Juli Colvin MD  Diplomate, American Board of Psychiatry and Neurology  Diplomate, American Board of Clinical Neurophysiology  Diplomate, American Board of Epilepsy

## 2020-12-11 NOTE — PROGRESS NOTES
Physical Therapy  Facility/Department: [facility] NEURO  Daily Treatment Note  NAME: Sanchez Don  : 1945  MRN: [id number]    Date of Service: 2020    Discharge Recommendations:  Patient would benefit from continued therapy after discharge   PT Equipment Recommendations  Other: CTA    Assessment   Body structures, Functions, Activity limitations: Decreased functional mobility ; Decreased posture;Decreased endurance;Decreased ROM; Decreased strength;Decreased safe awareness;Decreased balance  Assessment: Pt ambulated 10ft forward, 10ft backward with min Ax 2. Pt required increased time to process commands, and frequent rest breaks due to fatigue. Pt would benefit from cont PT to address deficits. Prognosis: Good  PT Education: Goals;PT Role;Plan of Care;General Safety;Gait Training;Transfer Training  REQUIRES PT FOLLOW UP: Yes  Activity Tolerance  Activity Tolerance: Patient limited by cognitive status; Patient limited by fatigue;Patient limited by pain(c/o pain in LLE when ambulating)     Patient Diagnosis(es): The primary encounter diagnosis was Brain mass. A diagnosis of Светлана coma scale total score 3-8, in the field (EMT or ambulance) [facility]) was also pertinent to this visit. has no past medical history on file. has no past surgical history on file. Restrictions  Restrictions/Precautions  Restrictions/Precautions: Fall Risk  Required Braces or Orthoses?: No  Position Activity Restriction  Other position/activity restrictions: Up with assist  Subjective   General  Chart Reviewed: Yes  Response To Previous Treatment: Patient with no complaints from previous session. Family / Caregiver Present: No  Subjective  Subjective: RN and pt agreeable to PT. Pt supine in bed upon arrival, on 2.5L NC. Objective   Bed mobility  Supine to Sit: Moderate assistance  Scooting: Contact guard assistance  Comment: Pt required verbal cues for sequencing. Retired to w/c at end of session.   Transfers  Sit to Stand: Minimal Assistance;2 Person Assistance  Stand to sit: Minimal Assistance;2 Person Assistance  Bed to Chair: Minimal assistance;2 Person Assistance  Comment: STS x2 with RW. Pt required increased time to process commands, verbal cues for hand placement on walker, and sequencing with good return. Ambulation  Ambulation?: Yes  Ambulation 1  Surface: level tile  Device: Rolling Walker  Assistance: Minimal assistance;2 Person assistance  Quality of Gait: High reliance on RW, high steppage gait with L.  Gait Deviations: Slow Juliet;Decreased step length;Decreased step height  Distance: 10ft forward, 10ft backward  Comments: Required assistance with backward due to LOB, MIN A for recovery. Ambulation limited due to fatigue, and c/o pain in LLE (RN notified). Stairs/Curb  Stairs?: No     Balance  Sitting - Static: Good;-  Sitting - Dynamic: Good;-  Standing - Static: Fair;-  Standing - Dynamic: Fair;-  Exercises  Knee Long Arc Quad: BLE x10  Ankle Pumps: BLE x10  Core Strengthening: EOB ~ 10 minutes (CGA-SBA)  Upper extremity exercises: Bicep curl, shoulder flexion/extension,shoulder abduction/adduction. Reps: BUE x10   Comments:  Required increased time due to fatigue. Goals  Short term goals  Time Frame for Short term goals: 15  Short term goal 1: Pt to perform bed mobility CGA  Short term goal 2: Pt to demonstrate functional transfers CGA  Short term goal 3: Ambulate 100ft w/ least restrictive AD CGA  Short term goal 4: Demonstrate standing balance of good - to decrease fall risk  Patient Goals   Patient goals :  To go home    Plan    Plan  Times per week: 5-6x/week  Current Treatment Recommendations: Strengthening, Transfer Training, Endurance Training, Patient/Caregiver Education & Training, ROM, Balance Training, Gait Training, Home Exercise Program, Functional Mobility Training, Stair training, Safety Education & Training  Safety Devices  Type of devices: Gait belt, Nurse notified(Pt transferring to [facility] at end of session. In W/C accompanied by RN.)  Restraints  Initially in place: No     Therapy Time   Individual Concurrent Group Co-treatment   Time In 1350         Time Out 1432         Minutes 42         Timed Code Treatment Minutes: Gracie   Treatment performed by Student PTA under the supervision of co-signing PTA who agrees with all treatment and documentation.   Cody Ellington PTA

## 2020-12-11 NOTE — PROGRESS NOTES
Orientation Status: Impaired  Orientation Level: Oriented to person;Disoriented to place; Disoriented to time;Disoriented to situation (pt oriented to first name however not last name)  Objective    ADL  Grooming: Stand by assistance;Setup;Verbal cueing(face washing completed seated at EOB)  LE Dressing: Moderate assistance;Setup;Verbal cueing; Increased time to complete(to doff/don socks seated at EOB pt req Max A with LLE and Min A with RLE)  Additional Comments: Pt pleasant and cooperative throughout session. Education and demonstration provided on 4 figure dressing technique pt demo F hip flexion  Balance  Sitting Balance: Stand by assistance(Pt tolerated approx 6-8 min seated at EOB static/dynamic)  Standing Balance: Contact guard assistance  Standing Balance  Time: Pt tolerated approx 1-2 min  Activity: static standing  Comment: utilizing RW  Functional Mobility  Functional - Mobility Device: Rolling Walker  Activity: To/from bathroom  Assist Level: Minimal assistance  Functional Mobility Comments: pt req assist with walker management  Toilet Transfers  Toilet - Technique: Ambulating  Equipment Used: Standard toilet  Toilet Transfer: Minimal assistance  Toilet Transfers Comments: utilizing grab bar for sit><stand from low toilet BSC placed over toilet to increase height in attempt to increase pt independence  Bed mobility  Supine to Sit: Contact guard assistance  Sit to Supine: Contact guard assistance  Scooting: Contact guard assistance  Comment: HOB elevated  Transfers  Stand Step Transfers: Minimal assistance  Sit to stand: Minimal assistance  Stand to sit: Minimal assistance  Transfer Comments: utilizing RW  Cognition  Overall Cognitive Status: Exceptions  Arousal/Alertness: Appropriate responses to stimuli  Following Commands: Follows one step commands with repetition; Follows one step commands with increased time  Attention Span: Attends with cues to redirect  Memory: Decreased short term memory;Decreased recall of biographical Information;Decreased recall of precautions;Decreased recall of recent events  Safety Judgement: Decreased awareness of need for assistance;Decreased awareness of need for safety  Problem Solving: Decreased awareness of errors;Assistance required to identify errors made;Assistance required to generate solutions;Assistance required to implement solutions;Assistance required to correct errors made  Insights: Decreased awareness of deficits  Initiation: Requires cues for some  Sequencing: Requires cues for some     Pt and RN agreeable to therapy this day. ADL tasks of LB dressing and grooming completed see above for LOF. Pt ambulated EOB><toilet however declined toileting task this day. 1 LOB noted during mobility pt able to self correct with min A. At session end pt supine in bed with call light in reach, bed alarm on and RN present.    Plan   Plan  Times per week: 3-5x/wk  Cont POC  Goals  Short term goals  Time Frame for Short term goals: pt will, by discharge  Short term goal 1: maintain attention to task for ~6 minutes with 2-3 verbal cues for redirection  Short term goal 2: complete LB ADLs with mod A, set up and AE, as needed  Short term goal 3: complete UB ADLs and grooming tasks with SBA and set up  Short term goal 4: increase activity tolerance to 20+ minutes in order to participate in daily tasks  Short term goal 5: dem SBA during functional transfers/functional mobility with LRD       Therapy Time   Individual Concurrent Group Co-treatment   Time In 1444         Time Out 1515         Minutes 31         Timed Code Treatment Minutes: [address] [facility], ROMERO/ Inflammation Suggestive Of Cancer Camouflage Histology Text: There was a dense lymphocytic infiltrate which prevented adequate histologic evaluation of adjacent structures.

## 2020-12-11 NOTE — PROGRESS NOTES
Speech Language Pathology  [address] [facility]  Speech Language Pathology    Date: 12/11/2020  Patient Name: Denice Marc  YOB: 1945   AGE: 66 y.o. MRN: [id number]        Patient Not Available for Speech Therapy     Due to:  [] Testing  [] Hemodialysis  [] Cancelled by RN  [] Surgery   [] Intubation/Sedation/Pain Medication  [] Medical instability  [x] Other: ECHO at bedside, ST to attempt in PM as able.      Next scheduled treatment: 12/11/2020 in PM as able; 12/12/2020    Completed by: Lavanda Sandhoff, M.S. CF-SLP

## 2020-12-11 NOTE — CONSULTS
Physical Medicine & Rehabilitation  Consult Note      Admitting Physician: Venkatesh Delgadillo MD    Primary Care Provider: No primary care provider on file. Reason for Consult:  Acute Inpatient Rehabilitation    Chief Complaint: Change in mental status, weakness, dysarthria    History of Present Illness:  Referring Provider is requesting an evaluation for appropriate placement upon discharge from acute care. Mr. Lillie Malik is a 66 y.o.  male who was admitted to [facility] on 12/9/2020 with Cerebrovascular Accident    57-year-old male presented with altered mental status with garbled speech left facial droop. On arrival GCS 8-10-noted left facial palsy left arm drift and weakness lower extremities bilaterally, sensory loss and aphasia CT showed left posterior temporal lobe mass approximate 3 cm with surrounding vasogenic edema. Neurosurgery consulted. Patient started on Keppra due to possible seizure-like activity    Neurosurgery-left posterior temporal lobe enhancing mass, plan for mass resection and biopsy on Monday 12/14, on Decadron    Neurology-CT left posterior temporal lobe mass 3 cm, Keppra for possible seizure,, neurosurgery follow loaded with aspirin concern of stroke, continue Lipitor echo pending    Neuro [id number]    MRI brain  Impression:          Ring-enhancing left temporal lobe mass consistent with abscess versus primary   or secondary neoplasm.  Local edema and slight midline shift.  Mixed   cytotoxic and vasogenic edema.  Microscopic hemorrhage. CT thoracic/lumbar spine  Impression:          CT thoracic spine: Degenerative and degenerative disc disease.  No acute   fracture or traumatic malalignment. CT lumbar spine: No acute fracture.  Patient has an anterolisthesis L5 upon   S1 and bilateral chronic spondylosis.  Multilevel degenerative and   degenerative disc changes are noted as above.  Multilevel disc bulges.    Exiting L5-S1 nerve roots are elevated and borderline impinged. Non contrast CT head 12/9   Impression:         1. Noncontrast CT of the head demonstrates presence of a 3 cm left posterior   temporal lobe mass with surrounding vasogenic edema.  Minimal midline shift   from left to right is noted.  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 MRI of the brain with contrast is recommended. 2. Unremarkable CTA of the head and neck. Review of Systems:  Constitutional: negative for anorexia, chills, fatigue, fevers, sweats and weight loss  Eyes: negative for redness and visual disturbance  Ears, nose, mouth, throat, and face: negative for earaches, sore throat and tinnitus  Respiratory: negative for cough and shortness of breath  Cardiovascular: negative for chest pain, dyspnea and palpitations  Gastrointestinal: negative for abdominal pain, change in bowel habits, constipation, nausea and vomiting  Genitourinary:negative for dysuria, frequency, hesitancy and urinary incontinence  Integument/breast: negative for pruritus and rash  Musculoskeletal:negative for muscle weakness and stiff joints  Neurological: negative for dizziness, headaches and weakness  Behavioral/Psych: negative for decreased appetite, depression and fatigue    Functional History:  PTA: Independent with all activities. Current:  PT:  Restrictions/Precautions: Fall Risk  Other position/activity restrictions: CTLS clear, LTME   Transfers  Sit to Stand: Minimal Assistance, 2 Person Assistance  Stand to sit: Minimal Assistance, 2 Person Assistance  Comment: Sit to stand performed with RW. Pt required verbal and tactile cueing for hand placement and sequencing with good return demo. Ambulation 1  Surface: level tile  Device: Rolling Walker  Other Apparatus: O2(2.5L)  Assistance: Minimal assistance  Quality of Gait: High reliance on RW  Gait Deviations: Slow Juliet  Distance: 2ft  Comments: Assist provided for RW progression and maintenance of balance.  Ambulation distance limited due to fatigue and LTME. Max verbal cueing required for sequencing with good return demo. OT:   ADL  Feeding: Supervision  Grooming: Minimal assistance  UE Bathing: Minimal assistance; Increased time to complete  LE Bathing: Maximum assistance; Increased time to complete  UE Dressing: Minimal assistance; Increased time to complete  LE Dressing: Maximum assistance; Increased time to complete  Toileting: Increased time to complete; Moderate assistance  Additional Comments: pt using urinal upon arrival and required assistance for placement and keeping urinal in place      ST:    Assessment: Pt presents with moderate-severe receptive & expressive language deficits characterized by difficulty with stating biographical/orientation information, follow 1- and 2-step commands, answering yes/no questions, identifying common objects, and completing automatic language tasks. Pt stated \"I can't think of the word right now. \" No dysarthria or oral motor deficits noted. ST to follow up to address noted deficits. Results & recommendations reviewed with pt & reported to RN. Past Medical History:    History reviewed. No pertinent past medical history. Past Surgical History:    History reviewed. No pertinent surgical history.     Allergies:    No Known Allergies     Current Medications:   Current Facility-Administered Medications: insulin lispro (HUMALOG) injection vial 0-6 Units, 0-6 Units, Subcutaneous, TID WC  glucose (GLUTOSE) 40 % oral gel 15 g, 15 g, Oral, PRN  dextrose 50 % IV solution, 12.5 g, Intravenous, PRN  glucagon (rDNA) injection 1 mg, 1 mg, Intramuscular, PRN  dextrose 5 % solution, 100 mL/hr, Intravenous, PRN  enoxaparin (LOVENOX) injection 30 mg, 30 mg, Subcutaneous, BID  potassium bicarb-citric acid (EFFER-K) effervescent tablet 40 mEq, 40 mEq, Oral, Daily  dexamethasone (DECADRON) injection 4 mg, 4 mg, Intravenous, Q6H  0.9 % sodium chloride infusion, , Intravenous, Continuous  labetalol (NORMODYNE;TRANDATE) injection 10 mg, 10 mg, Intravenous, Q10 Min PRN  levetiracetam (KEPPRA) 500 mg/100 mL IVPB, 500 mg, Intravenous, Q12H  famotidine (PEPCID) injection 20 mg, 20 mg, Intravenous, BID  atorvastatin (LIPITOR) tablet 40 mg, 40 mg, Oral, Nightly  sodium chloride flush 0.9 % injection 10 mL, 10 mL, Intravenous, PRN    Social History:  Social History     Socioeconomic History    Marital status:       Spouse name: Not on file    Number of children: Not on file    Years of education: Not on file    Highest education level: Not on file   Occupational History    Not on file   Social Needs    Financial resource strain: Not on file    Food insecurity     Worry: Not on file     Inability: Not on file    Transportation needs     Medical: Not on file     Non-medical: Not on file   Tobacco Use    Smoking status: Unknown If Ever Smoked   Substance and Sexual Activity    Alcohol use: Not on file     Comment: SAUNDRA     Drug use: Not on file    Sexual activity: Not on file     Comment: SAUNDRA   Lifestyle    Physical activity     Days per week: Not on file     Minutes per session: Not on file    Stress: Not on file   Relationships    Social connections     Talks on phone: Not on file     Gets together: Not on file     Attends Orthodox service: Not on file     Active member of club or organization: Not on file     Attends meetings of clubs or organizations: Not on file     Relationship status: Not on file    Intimate partner violence     Fear of current or ex partner: Not on file     Emotionally abused: Not on file     Physically abused: Not on file     Forced sexual activity: Not on file   Other Topics Concern    Not on file   Social History Narrative    Not on file     Social/Functional History  Lives With: Alone  Type of Home: House  Home Layout: One level  Home Access: Level entry  Bathroom Shower/Tub: Walk-in shower  Bathroom Toilet: Standard  Bathroom Equipment: Built-in shower seat, Grab bars in shower  Home Equipment: Rolling walker, Cane(pt reported no use of DME at baseline)  ADL Assistance: [facility]: Independent  Homemaking Responsibilities: Yes  Meal Prep Responsibility: Primary  Laundry Responsibility: Primary  Cleaning Responsibility: Primary  Ambulation Assistance: Independent  Transfer Assistance: Independent  Active : Yes  Mode of Transportation: Car  Occupation: Retired  Additional Comments: above information obtained from pt but pt questionable historian due to confusion    Family History:   History reviewed. No pertinent family history. Physical Exam:    BP (!) 128/57   Pulse 97   Temp 97.9 °F (36.6 °C) (Oral)   Resp 21   Ht 6' 2\" (1.88 m)   Wt 200 lb (90.7 kg)   SpO2 98%   BMI 25.68 kg/m²     General appearance: alert, appears stated age,   Head: Normocephalic, without obvious abnormality, atraumatic  Eyes: conjunctivae clear. Throat: lips, mucosa, and tongue normal.  Neck: , symmetrical, trachea midline. Lungs: clear to auscultation bilaterally. Heart: regular rate and rhythm, no murmur. Abdomen: soft, non-tender; bowel sounds normal.  Extremities: extremities normal, atraumatic, no edema, normal tone. Mental status: Alert, questionable orientation states he does not know why he is here, did follow one-step commands. Sensory: Intact in BUE and BLE to soft and pin sensation. Motor: Muscle tone and bulk are normal bilaterally.    At least antigravity strength upper and lower extremities      Diagnostics:  CBC   Lab Results   Component Value Date    WBC 5.1 12/11/2020    RBC 5.14 12/11/2020    HGB 16.1 12/11/2020    HCT 49.5 12/11/2020    MCV 96.3 12/11/2020    RDW 12.5 12/11/2020     12/11/2020     BMP    Lab Results   Component Value Date     12/11/2020    K 4.2 12/11/2020     12/11/2020    CO2 21 12/11/2020    BUN 18 12/11/2020     Uric Acid  No components found for: URIC  VITAMIN B12 No components found for: B12  PT/INR  No results found for: PTINR    Radiology:     Impression: Mr. Michelle Christopher is a 66 y.o.  male with a history of <principal problem not specified>    1. Left posterior temporal mass, change in mental status, left facial droop-plan for resection/biopsy 12/14 Decadron statin  2. Possible seizure-Keppra  3. Aphasia  4. GERD-Pepcid    Recommendations:  1. Diagnosis: Left temporal mass with left facial droop, change in mental status, aphasia  2. Therapy: Min assist 2 person sit to stand, ambulate 2 feet slow ulx, min to max assist ADLs, expressive and receptive aphasia  3. Medical  Necessity: As above  4. Support: Clarify, will need 24/7 due to cognitive deficits  5. Rehab recommendation: Suspect will benefit from acute inpatient rotation when medically ready, however will need to clarify support and will follow up with therapies post mass resection  6. DVT proph: Lovenox    It was my pleasure to evaluate Michelle Christopher today. Please call with questions. Dora Hartmann. Gail Bocanegra MD          This note is created with the assistance of a speech recognition program.  While intending to generate a document that actually reflects the content of the visit, the document can still have some errors including those of syntax and sound a like substitutions which may escape proof reading.   In such instances, actual meaning can be extrapolated by contextual diversion

## 2020-12-11 NOTE — PLAN OF CARE
TODAY:  12/11/20      AWAKE & FOLLOWING COMMANDS:  [x] No   [] Yes    INTUBATED:   [x] No   [] Yes    SEDATION/ANALGESIA:    [] Propofol gtt  [] Versed gtt  [] Ativan gtt   [] No Sedation       FEEDING: Able to take PO?         [x] Yes:  Diet:  Dental soft diet   DVT Prophylaxis:  [x] Yes:           [] No rationale:     Stress Ulcer Prophylaxis: [x] Yes:  Pepcid  [] Not indicated    VASOPRESSORS:  [x] No           CENTRAL/ARTERIAL LINES:  [x] No         MOJICA CATHETER: [x] No    [] Yes:       DRAINS: [x] No    [] Yes:       Head of Bed: [x] Elevated:          [] Flat    Glucose management: [x] Not indicated, consistently less than 180 [x] Sliding Scale :    Low dose sliding scale         [] Long Acting:    Secondary Stroke PPX: [] Antiplatelet:                [x] Statin:   Lipitor 40    Electronically signed by Santosh Fermin MD on 12/11/2020 at 10:11 AM

## 2020-12-12 LAB
ANION GAP SERPL CALCULATED.3IONS-SCNC: 6 MMOL/L (ref 9–17)
BUN BLDV-MCNC: 28 MG/DL (ref 8–23)
BUN/CREAT BLD: ABNORMAL (ref 9–20)
CALCIUM SERPL-MCNC: 8.4 MG/DL (ref 8.6–10.4)
CHLORIDE BLD-SCNC: 106 MMOL/L (ref 98–107)
CO2: 21 MMOL/L (ref 20–31)
CREAT SERPL-MCNC: 1.02 MG/DL (ref 0.7–1.2)
GFR AFRICAN AMERICAN: >60 ML/MIN
GFR NON-AFRICAN AMERICAN: >60 ML/MIN
GFR SERPL CREATININE-BSD FRML MDRD: ABNORMAL ML/MIN/{1.73_M2}
GFR SERPL CREATININE-BSD FRML MDRD: ABNORMAL ML/MIN/{1.73_M2}
GLUCOSE BLD-MCNC: 122 MG/DL (ref 75–110)
GLUCOSE BLD-MCNC: 141 MG/DL (ref 75–110)
GLUCOSE BLD-MCNC: 146 MG/DL (ref 75–110)
GLUCOSE BLD-MCNC: 149 MG/DL (ref 70–99)
GLUCOSE BLD-MCNC: 159 MG/DL (ref 75–110)
HCT VFR BLD CALC: 42.7 % (ref 40.7–50.3)
HEMOGLOBIN: 14.2 G/DL (ref 13–17)
MCH RBC QN AUTO: 31.8 PG (ref 25.2–33.5)
MCHC RBC AUTO-ENTMCNC: 33.3 G/DL (ref 28.4–34.8)
MCV RBC AUTO: 95.7 FL (ref 82.6–102.9)
NRBC AUTOMATED: 0 PER 100 WBC
PDW BLD-RTO: 12.5 % (ref 11.8–14.4)
PHOSPHORUS: 2.7 MG/DL (ref 2.5–4.5)
PLATELET # BLD: 162 K/UL (ref 138–453)
PMV BLD AUTO: 9.9 FL (ref 8.1–13.5)
POTASSIUM SERPL-SCNC: 4.2 MMOL/L (ref 3.7–5.3)
RBC # BLD: 4.46 M/UL (ref 4.21–5.77)
SODIUM BLD-SCNC: 133 MMOL/L (ref 135–144)
WBC # BLD: 12.5 K/UL (ref 3.5–11.3)

## 2020-12-12 PROCEDURE — 84100 ASSAY OF PHOSPHORUS: CPT

## 2020-12-12 PROCEDURE — 82947 ASSAY GLUCOSE BLOOD QUANT: CPT

## 2020-12-12 PROCEDURE — 6370000000 HC RX 637 (ALT 250 FOR IP): Performed by: STUDENT IN AN ORGANIZED HEALTH CARE EDUCATION/TRAINING PROGRAM

## 2020-12-12 PROCEDURE — 99232 SBSQ HOSP IP/OBS MODERATE 35: CPT | Performed by: NEUROLOGICAL SURGERY

## 2020-12-12 PROCEDURE — 00B00ZZ EXCISION OF BRAIN, OPEN APPROACH: ICD-10-PCS | Performed by: NEUROLOGICAL SURGERY

## 2020-12-12 PROCEDURE — 36415 COLL VENOUS BLD VENIPUNCTURE: CPT

## 2020-12-12 PROCEDURE — 2500000003 HC RX 250 WO HCPCS: Performed by: STUDENT IN AN ORGANIZED HEALTH CARE EDUCATION/TRAINING PROGRAM

## 2020-12-12 PROCEDURE — 6360000002 HC RX W HCPCS: Performed by: STUDENT IN AN ORGANIZED HEALTH CARE EDUCATION/TRAINING PROGRAM

## 2020-12-12 PROCEDURE — 85027 COMPLETE CBC AUTOMATED: CPT

## 2020-12-12 PROCEDURE — 80048 BASIC METABOLIC PNL TOTAL CA: CPT

## 2020-12-12 PROCEDURE — 2580000003 HC RX 258: Performed by: STUDENT IN AN ORGANIZED HEALTH CARE EDUCATION/TRAINING PROGRAM

## 2020-12-12 PROCEDURE — 97110 THERAPEUTIC EXERCISES: CPT

## 2020-12-12 PROCEDURE — APPNB45 APP NON BILLABLE 31-45 MINUTES: Performed by: NURSE PRACTITIONER

## 2020-12-12 PROCEDURE — 97530 THERAPEUTIC ACTIVITIES: CPT

## 2020-12-12 PROCEDURE — 2060000000 HC ICU INTERMEDIATE R&B

## 2020-12-12 PROCEDURE — 97116 GAIT TRAINING THERAPY: CPT

## 2020-12-12 RX ADMIN — DEXAMETHASONE SODIUM PHOSPHATE 4 MG: 4 INJECTION, SOLUTION INTRAMUSCULAR; INTRAVENOUS at 05:36

## 2020-12-12 RX ADMIN — LEVETIRACETAM 500 MG: 5 INJECTION INTRAVENOUS at 02:16

## 2020-12-12 RX ADMIN — DESMOPRESSIN ACETATE 40 MG: 0.2 TABLET ORAL at 19:34

## 2020-12-12 RX ADMIN — LEVETIRACETAM 500 MG: 5 INJECTION INTRAVENOUS at 14:20

## 2020-12-12 RX ADMIN — DEXAMETHASONE SODIUM PHOSPHATE 4 MG: 4 INJECTION, SOLUTION INTRAMUSCULAR; INTRAVENOUS at 17:53

## 2020-12-12 RX ADMIN — INSULIN LISPRO 1 UNITS: 100 INJECTION, SOLUTION INTRAVENOUS; SUBCUTANEOUS at 17:53

## 2020-12-12 RX ADMIN — POTASSIUM BICARBONATE 40 MEQ: 782 TABLET, EFFERVESCENT ORAL at 09:04

## 2020-12-12 RX ADMIN — ENOXAPARIN SODIUM 30 MG: 100 INJECTION SUBCUTANEOUS at 19:34

## 2020-12-12 RX ADMIN — FAMOTIDINE 20 MG: 10 INJECTION INTRAVENOUS at 19:34

## 2020-12-12 RX ADMIN — INSULIN LISPRO 1 UNITS: 100 INJECTION, SOLUTION INTRAVENOUS; SUBCUTANEOUS at 09:04

## 2020-12-12 RX ADMIN — SODIUM CHLORIDE: 9 INJECTION, SOLUTION INTRAVENOUS at 02:16

## 2020-12-12 RX ADMIN — ENOXAPARIN SODIUM 30 MG: 100 INJECTION SUBCUTANEOUS at 09:04

## 2020-12-12 RX ADMIN — FAMOTIDINE 20 MG: 10 INJECTION INTRAVENOUS at 09:04

## 2020-12-12 RX ADMIN — DEXAMETHASONE SODIUM PHOSPHATE 4 MG: 4 INJECTION, SOLUTION INTRAMUSCULAR; INTRAVENOUS at 12:01

## 2020-12-12 NOTE — PROGRESS NOTES
Physical Therapy  Daily Treatment Note  NAME: Samantha Felix  : 1945  MRN: [id number]    Date of Service: 2020    Discharge Recommendations:  Patient would benefit from continued therapy after discharge   PT Equipment Recommendations  Other: CTA    Assessment   Body structures, Functions, Activity limitations: Decreased functional mobility ; Decreased posture;Decreased endurance;Decreased ROM; Decreased strength;Decreased safe awareness;Decreased balance  Assessment: Pt ambulated ~150ft with a RW and CGA. Pt required increased time to process commands, and 2 rest breaks to focus on breathing. Pt would benefit from cont PT to address deficits. Prognosis: Good  PT Education: Goals;PT Role;Plan of Care;General Safety;Gait Training;Transfer Training;Home Exercise Program;Adaptive Device Training; Injury Prevention;Pressure Relief; Functional Mobility Training;Orientation  Patient Education: safe use of RW  REQUIRES PT FOLLOW UP: Yes  Activity Tolerance  Activity Tolerance: Patient limited by fatigue;Patient limited by endurance; Patient limited by cognitive status     Patient Diagnosis(es): The primary encounter diagnosis was Brain mass. A diagnosis of Светлана coma scale total score 3-8, in the field (EMT or ambulance) [facility]) was also pertinent to this visit. has no past medical history on file. has no past surgical history on file. Restrictions  Restrictions/Precautions  Restrictions/Precautions: Fall Risk  Required Braces or Orthoses?: No  Position Activity Restriction  Other position/activity restrictions: Up with assist     Subjective   General  Chart Reviewed: Yes  Response To Previous Treatment: Patient with no complaints from previous session. Family / Caregiver Present: No  Subjective  Subjective: RN and pt agreeable to PT.  Pt looking forward to walking  Pain Screening  Patient Currently in Pain: Denies  Vital Signs  Patient Currently in Pain: Denies       Orientation  Orientation Overall Orientation Status: Within Functional Limits(slow verbal responses; oriented with some guidance)    Objective   Bed mobility  Supine to Sit: Stand by assistance  Sit to Supine: Stand by assistance  Scooting: Stand by assistance  Comment: HOB upright; assist with lines  Transfers  Sit to Stand: Contact guard assistance  Stand to sit: Contact guard assistance  Comment: used RW  Ambulation  Ambulation?: Yes  Ambulation 1  Surface: level tile  Device: Rolling Walker  Other Apparatus: Wheelchair follow  Assistance: Contact guard assistance  Quality of Gait: no LOB  Gait Deviations: Slow Juliet;Decreased step length;Decreased step height  Distance: ~150ft  Stairs/Curb  Stairs?: No     Balance  Posture: Good  Sitting - Static: Good  Sitting - Dynamic: Good  Standing - Static: Fair;+  Exercises  Hip Flexion: BLE x10  Hip Abduction: BLE x10  Knee Long Arc Quad: BLE x10  Ankle Pumps: BLE x10       Goals  Short term goals  Time Frame for Short term goals: 14  Short term goal 1: Pt to perform bed mobility CGA  Short term goal 2: Pt to demonstrate functional transfers CGA  Short term goal 3: Ambulate 100ft w/ least restrictive AD CGA  Short term goal 4: Demonstrate standing balance of good - to decrease fall risk  Patient Goals   Patient goals : To go home    Plan    Plan  Times per week: 5-6x/week  Current Treatment Recommendations: Strengthening, Transfer Training, Endurance Training, Patient/Caregiver Education & Training, ROM, Balance Training, Gait Training, Home Exercise Program, Functional Mobility Training, Stair training, Safety Education & Training  Safety Devices  Type of devices: Gait belt, Nurse notified(Pt transferring to [facility] at end of session.  In W/C accompanied by RN.)  Restraints  Initially in place: No     Therapy Time   Individual Concurrent Group Co-treatment   Time In 1496         Time Out 1605         Minutes 38         Timed Code Treatment Minutes: [address], PTA

## 2020-12-12 NOTE — PROGRESS NOTES
Neurosurgery Service  Resident Daily Progress Note   12/12/2020 7:48 AM     Subjective  Patient is seen and evaluated at bedside, chart reviewed  No acute events overnight. No new complaints. Objective    Vitals:    12/11/20 1625 12/11/20 1831 12/12/20 0000 12/12/20 0400   BP: 132/72 (!) 141/64 (!) 103/55 (!) 100/57   Pulse: 84 85 63 58   Resp: 13 22 18 19   Temp: 97.8 °F (36.6 °C) 97.5 °F (36.4 °C) 97.8 °F (36.6 °C) 97.4 °F (36.3 °C)   TempSrc: Oral Oral Oral Oral   SpO2: 98%  94% 91%   Weight:       Height:           Physical Exam:  Gen: Resting comfortably, no acute distress, oriented to self  CV: RRR  Resp: CTAB  GI: Abdomen soft, non-tender  Skin: Cap refill< 2 seconds,   Neuro:    A&Ox3   PERRL, EOMI   CNII-XII intact   Normal speech and mentation  Lift all extremities against gravity  Sensation intact    Labs:  Lab Results   Component Value Date    WBC 12.5 (H) 12/12/2020    HGB 14.2 12/12/2020    HCT 42.7 12/12/2020     12/12/2020    HDL 37 (L) 12/09/2020    ALT 28 12/09/2020    AST 23 12/09/2020     (L) 12/12/2020    K 4.2 12/12/2020     12/12/2020    CREATININE 1.02 12/12/2020    BUN 28 (H) 12/12/2020    CO2 21 12/12/2020    TSH 2.76 12/09/2020    INR 1.0 12/09/2020    LABA1C 5.5 12/10/2020           ASSESSMENT & PLAN:    Mil Alejandra is a 66 y.o. male who presents with left posterior temporal lobe enhancing mass    -Plan for mass resection and biopsy on Monday  -Continue Decadron 4 mg every 6  -On Keppra 500 mg twice daily for seizure prevention      Please contact neurosurgery with any changes in patient's neurologic status.       Dariela Swanson MD  PGY-3 Neurology Resident Physician  Neurosurgery/Neuro Critical Care Team  Pager [phone]

## 2020-12-13 ENCOUNTER — ANESTHESIA EVENT (OUTPATIENT)
Dept: OPERATING ROOM | Age: 75
DRG: 025 | End: 2020-12-13
Payer: MEDICARE

## 2020-12-13 LAB
ANION GAP SERPL CALCULATED.3IONS-SCNC: 11 MMOL/L (ref 9–17)
BUN BLDV-MCNC: 26 MG/DL (ref 8–23)
BUN/CREAT BLD: ABNORMAL (ref 9–20)
CALCIUM SERPL-MCNC: 8.4 MG/DL (ref 8.6–10.4)
CHLORIDE BLD-SCNC: 108 MMOL/L (ref 98–107)
CO2: 21 MMOL/L (ref 20–31)
CREAT SERPL-MCNC: 0.85 MG/DL (ref 0.7–1.2)
GFR AFRICAN AMERICAN: >60 ML/MIN
GFR NON-AFRICAN AMERICAN: >60 ML/MIN
GFR SERPL CREATININE-BSD FRML MDRD: ABNORMAL ML/MIN/{1.73_M2}
GFR SERPL CREATININE-BSD FRML MDRD: ABNORMAL ML/MIN/{1.73_M2}
GLUCOSE BLD-MCNC: 135 MG/DL (ref 70–99)
GLUCOSE BLD-MCNC: 135 MG/DL (ref 75–110)
GLUCOSE BLD-MCNC: 163 MG/DL (ref 75–110)
GLUCOSE BLD-MCNC: 170 MG/DL (ref 75–110)
HCT VFR BLD CALC: 45.2 % (ref 40.7–50.3)
HEMOGLOBIN: 15 G/DL (ref 13–17)
MCH RBC QN AUTO: 31.5 PG (ref 25.2–33.5)
MCHC RBC AUTO-ENTMCNC: 33.2 G/DL (ref 28.4–34.8)
MCV RBC AUTO: 95 FL (ref 82.6–102.9)
NRBC AUTOMATED: 0 PER 100 WBC
PDW BLD-RTO: 12.8 % (ref 11.8–14.4)
PHOSPHORUS: 2.7 MG/DL (ref 2.5–4.5)
PLATELET # BLD: 183 K/UL (ref 138–453)
PMV BLD AUTO: 10.6 FL (ref 8.1–13.5)
POTASSIUM SERPL-SCNC: 4 MMOL/L (ref 3.7–5.3)
RBC # BLD: 4.76 M/UL (ref 4.21–5.77)
SODIUM BLD-SCNC: 140 MMOL/L (ref 135–144)
WBC # BLD: 13.5 K/UL (ref 3.5–11.3)

## 2020-12-13 PROCEDURE — 2000000003 HC NEURO ICU R&B

## 2020-12-13 PROCEDURE — APPNB45 APP NON BILLABLE 31-45 MINUTES: Performed by: NURSE PRACTITIONER

## 2020-12-13 PROCEDURE — 2500000003 HC RX 250 WO HCPCS: Performed by: STUDENT IN AN ORGANIZED HEALTH CARE EDUCATION/TRAINING PROGRAM

## 2020-12-13 PROCEDURE — 6360000002 HC RX W HCPCS: Performed by: NEUROLOGICAL SURGERY

## 2020-12-13 PROCEDURE — 99233 SBSQ HOSP IP/OBS HIGH 50: CPT | Performed by: PSYCHIATRY & NEUROLOGY

## 2020-12-13 PROCEDURE — 6370000000 HC RX 637 (ALT 250 FOR IP): Performed by: STUDENT IN AN ORGANIZED HEALTH CARE EDUCATION/TRAINING PROGRAM

## 2020-12-13 PROCEDURE — 85027 COMPLETE CBC AUTOMATED: CPT

## 2020-12-13 PROCEDURE — 84100 ASSAY OF PHOSPHORUS: CPT

## 2020-12-13 PROCEDURE — 99291 CRITICAL CARE FIRST HOUR: CPT | Performed by: NEUROLOGICAL SURGERY

## 2020-12-13 PROCEDURE — 82947 ASSAY GLUCOSE BLOOD QUANT: CPT

## 2020-12-13 PROCEDURE — 2580000003 HC RX 258: Performed by: STUDENT IN AN ORGANIZED HEALTH CARE EDUCATION/TRAINING PROGRAM

## 2020-12-13 PROCEDURE — 6360000002 HC RX W HCPCS: Performed by: STUDENT IN AN ORGANIZED HEALTH CARE EDUCATION/TRAINING PROGRAM

## 2020-12-13 PROCEDURE — 36415 COLL VENOUS BLD VENIPUNCTURE: CPT

## 2020-12-13 PROCEDURE — 80048 BASIC METABOLIC PNL TOTAL CA: CPT

## 2020-12-13 RX ORDER — DEXAMETHASONE SODIUM PHOSPHATE 4 MG/ML
4 INJECTION, SOLUTION INTRA-ARTICULAR; INTRALESIONAL; INTRAMUSCULAR; INTRAVENOUS; SOFT TISSUE EVERY 8 HOURS
Status: DISCONTINUED | OUTPATIENT
Start: 2020-12-13 | End: 2020-12-15

## 2020-12-13 RX ORDER — DEXMEDETOMIDINE HYDROCHLORIDE 4 UG/ML
0.2 INJECTION, SOLUTION INTRAVENOUS CONTINUOUS
Status: DISCONTINUED | OUTPATIENT
Start: 2020-12-13 | End: 2020-12-15

## 2020-12-13 RX ADMIN — DEXAMETHASONE SODIUM PHOSPHATE 4 MG: 4 INJECTION, SOLUTION INTRAMUSCULAR; INTRAVENOUS at 17:54

## 2020-12-13 RX ADMIN — LEVETIRACETAM 500 MG: 5 INJECTION INTRAVENOUS at 16:58

## 2020-12-13 RX ADMIN — DEXAMETHASONE SODIUM PHOSPHATE 4 MG: 4 INJECTION, SOLUTION INTRAMUSCULAR; INTRAVENOUS at 00:42

## 2020-12-13 RX ADMIN — DEXAMETHASONE SODIUM PHOSPHATE 4 MG: 4 INJECTION, SOLUTION INTRAMUSCULAR; INTRAVENOUS at 05:26

## 2020-12-13 RX ADMIN — DEXMEDETOMIDINE HYDROCHLORIDE 0.2 MCG/KG/HR: 400 INJECTION INTRAVENOUS at 19:52

## 2020-12-13 RX ADMIN — FAMOTIDINE 20 MG: 10 INJECTION INTRAVENOUS at 09:54

## 2020-12-13 RX ADMIN — SODIUM CHLORIDE: 9 INJECTION, SOLUTION INTRAVENOUS at 21:00

## 2020-12-13 RX ADMIN — ENOXAPARIN SODIUM 30 MG: 100 INJECTION SUBCUTANEOUS at 09:55

## 2020-12-13 RX ADMIN — INSULIN LISPRO 1 UNITS: 100 INJECTION, SOLUTION INTRAVENOUS; SUBCUTANEOUS at 17:50

## 2020-12-13 RX ADMIN — LEVETIRACETAM 500 MG: 5 INJECTION INTRAVENOUS at 02:09

## 2020-12-13 RX ADMIN — DEXAMETHASONE SODIUM PHOSPHATE 4 MG: 4 INJECTION, SOLUTION INTRAMUSCULAR; INTRAVENOUS at 10:00

## 2020-12-13 RX ADMIN — INSULIN LISPRO 1 UNITS: 100 INJECTION, SOLUTION INTRAVENOUS; SUBCUTANEOUS at 12:38

## 2020-12-13 RX ADMIN — DESMOPRESSIN ACETATE 40 MG: 0.2 TABLET ORAL at 21:00

## 2020-12-13 RX ADMIN — FAMOTIDINE 20 MG: 10 INJECTION INTRAVENOUS at 21:00

## 2020-12-13 NOTE — CARE COORDINATION
Patient to go to the OR tomorrow for brain mass resection and biopsy. Original plan was to return home with daughter and fiance to move in. Will update plan after PT/OT evaluations post op    1131 Spoke to Larry Bauer patient's daughter, she is agreeable to ARU.   The plan will still remain the same that she and her fiance will be moving into his home post ARU/Discharge

## 2020-12-13 NOTE — ANESTHESIA PRE PROCEDURE
Department of Anesthesiology  Preprocedure Note       Name:  Marlys Teixeira   Age:  66 y.o.  :  1945                                          MRN:  [id number]         Date:  2020      Surgeon: Roxanne Keen):  Sanna Merlin, DO    Procedure: Procedure(s):  TEMPORAL CRANIOTOMY FOR TUMOR, IMAGE GUIDED  (MEDTRONIC, MICROSCOPE, ULTRASOUND, GLEOLAN, MISONIX)    Medications prior to admission:   Prior to Admission medications    Not on File       Current medications:    Current Facility-Administered Medications   Medication Dose Route Frequency Provider Last Rate Last Admin    insulin lispro (HUMALOG) injection vial 0-6 Units  0-6 Units Subcutaneous TID WC Naye Digioia, DO   1 Units at 20 1238    glucose (GLUTOSE) 40 % oral gel 15 g  15 g Oral PRN Naye Digioia, DO        dextrose 50 % IV solution  12.5 g Intravenous PRN Naye Digioia, DO        glucagon (rDNA) injection 1 mg  1 mg Intramuscular PRN Naye Digioia, DO        dextrose 5 % solution  100 mL/hr Intravenous PRN Naye Digioia, DO        enoxaparin (LOVENOX) injection 30 mg  30 mg Subcutaneous BID [facility]   30 mg at 20 0955    [Held by provider] potassium bicarb-citric acid (EFFER-K) effervescent tablet 40 mEq  40 mEq Oral Daily Denver Kurtis, DO   40 mEq at 20 0904    dexamethasone (DECADRON) injection 4 mg  4 mg Intravenous Q6H Denver Kurtis, DO   4 mg at 20 1000    0.9 % sodium chloride infusion   Intravenous Continuous Naye Digioia, DO 75 mL/hr at 20 0216 New Bag at 20 0216    labetalol (NORMODYNE;TRANDATE) injection 10 mg  10 mg Intravenous Q10 Min PRN Alex Hull, DO        levetiracetam (KEPPRA) 500 mg/100 mL IVPB  500 mg Intravenous Q12H Alexkerry Hull, DO   Stopped at 20 0224    famotidine (PEPCID) injection 20 mg  20 mg Intravenous BID Denver Ishpam, DO   20 mg at 20 2495  atorvastatin (LIPITOR) tablet 40 mg  40 mg Oral Nightly Deirdre Gamboa DO   40 mg at 12/12/20 1934    sodium chloride flush 0.9 % injection 10 mL  10 mL Intravenous PRN Delmy Beebe MD   10 mL at 12/10/20 2100       Allergies:  No Known Allergies    Problem List:    Patient Active Problem List   Diagnosis Code    Brain mass G93.89    Acute encephalopathy G93.40    Cerebral edema ([facility] [address] G93.6       Past Medical History:  History reviewed. No pertinent past medical history. Past Surgical History:  History reviewed. No pertinent surgical history. Social History:    Social History     Tobacco Use    Smoking status: Unknown If Ever Smoked   Substance Use Topics    Alcohol use: Not on file     Comment: SAUNDRA                                 Counseling given: Not Answered      Vital Signs (Current):   Vitals:    12/13/20 0400 12/13/20 0633 12/13/20 0735 12/13/20 1159   BP: (!) 101/39  120/77 135/78   Pulse: 66 (!) 47 62 81   Resp: 19 15 20 17   Temp: 36.7 °C (98 °F)  36 °C (96.8 °F) 36.3 °C (97.4 °F)   TempSrc:   Oral Oral   SpO2: 95% 93% 94% 98%   Weight:       Height:                                                  BP Readings from Last 3 Encounters:   12/13/20 135/78       NPO Status:                                                                                 BMI:   Wt Readings from Last 3 Encounters:   12/09/20 200 lb (90.7 kg)     Body mass index is 25.68 kg/m².     CBC:   Lab Results   Component Value Date    WBC 13.5 12/13/2020    RBC 4.76 12/13/2020    HGB 15.0 12/13/2020    HCT 45.2 12/13/2020    MCV 95.0 12/13/2020    RDW 12.8 12/13/2020     12/13/2020       CMP:   Lab Results   Component Value Date     12/13/2020    K 4.0 12/13/2020     12/13/2020    CO2 21 12/13/2020    BUN 26 12/13/2020    CREATININE 0.85 12/13/2020    GFRAA >60 12/13/2020    LABGLOM >60 12/13/2020    GLUCOSE 135 12/13/2020    PROT 6.9 12/09/2020    CALCIUM 8.4 12/13/2020    BILITOT 0.64 12/09/2020 ALKPHOS 48 12/09/2020    AST 23 12/09/2020    ALT 28 12/09/2020       POC Tests:   Recent Labs     12/13/20  1202   POCGLU 170*       Coags:   Lab Results   Component Value Date    PROTIME 10.7 12/09/2020    INR 1.0 12/09/2020    APTT 25.4 12/09/2020       HCG (If Applicable): No results found for: PREGTESTUR, PREGSERUM, HCG, HCGQUANT     ABGs: No results found for: PHART, PO2ART, LDK5YKF, IRI2OKB, BEART, I9FNFYCM     Type & Screen (If Applicable):  No results found for: LABABO, LABRH    Drug/Infectious Status (If Applicable):  No results found for: HIV, HEPCAB    COVID-19 Screening (If Applicable):   Lab Results   Component Value Date    COVID19 Not Detected 12/09/2020         Anesthesia Evaluation  Patient summary reviewed  Airway: Mallampati: II        Dental:    (+) upper dentures and lower dentures      Pulmonary:Negative Pulmonary ROS                              Cardiovascular:Negative CV ROS            Rhythm: regular  Rate: normal                    Neuro/Psych:   Negative Neuro/Psych ROS              GI/Hepatic/Renal: Neg GI/Hepatic/Renal ROS            Endo/Other: Negative Endo/Other ROS                    Abdominal:           Vascular: negative vascular ROS. Anesthesia Plan      general     ASA 4       Induction: inhalational.      Anesthetic plan and risks discussed with patient. Use of blood products discussed with patient whom. Plan discussed with CRNA.                   MING Viera CRNA   12/13/2020

## 2020-12-13 NOTE — CONSULTS
Pulse:   Pulse: (!) 47(primary notified of chuck HR as low as 39)  BP:    BP: (!) 101/39    Constitutional and General Appearance: alert, cooperative, no distress and appears stated age  HEENT: PERRL, no cervical lymphadenopathy. No masses palpable. Normal oral mucosa  Respiratory:  · Normal excursion and expansion without use of accessory muscles  · Resp Auscultation: Good respiratory effort. No for increased work of breathing. On auscultation: clear to auscultation bilaterally  Cardiovascular:  · The apical impulse is not displaced  · Heart auscultation: 1, S2 heard, no murmur, rubs or gallops. · Jugular venous pulsation Normal  · The carotid upstroke is normal in amplitude and contour without delay or bruit  · Peripheral pulses are symmetrical and full   Abdomen:  · No masses or tenderness  · Bowel sounds present  Extremities:  ·  No Cyanosis or Clubbing  ·  Lower extremity edema: No  ·  Skin: Warm and dry  Neurological:  · Alert and oriented. · Moves all extremities well  · No abnormalities of mood, affect, memory, mentation, or behavior are noted    DATA:    Labs:   CBC:   Recent Labs     12/12/20  0605 12/13/20  0424   WBC 12.5* 13.5*   HGB 14.2 15.0   HCT 42.7 45.2    183     BMP:   Recent Labs     12/12/20  0605 12/13/20  0424   * 140   K 4.2 4.0   CO2 21 21   BUN 28* 26*   CREATININE 1.02 0.85   LABGLOM >60 >60   GLUCOSE 149* 135*     BNP: No results for input(s): BNP in the last 72 hours. PT/INR: No results for input(s): PROTIME, INR in the last 72 hours. APTT:No results for input(s): APTT in the last 72 hours. CARDIAC ENZYMES:No results for input(s): CKTOTAL, CKMB, CKMBINDEX, TROPONINI in the last 72 hours. FASTING LIPID PANEL:  Lab Results   Component Value Date    HDL 37 12/09/2020     LIVER PROFILE:No results for input(s): AST, ALT, LABALBU in the last 72 hours.     Imaging  Echo Complete 2d W Doppler W Color    Result Date: 12/12/2020 Transthoracic Echocardiography Report (TTE)  Patient Name [facility]      Date of Study               12/11/2020               Irena Proud   Date of      1945  Gender                      Male  Birth   Age          66 year(s)  Race                           Room Number  5114        Height:                     74 inch, 187.96 cm   Corporate ID [id number]    Weight:                     200 pounds, 90.7 kg  #   Patient Acct [de-identified]   BSA:          2.17 m^2      BMI:      25.68  #                                                              kg/m^2   MR #         [id number]     Juan Fagan   Accession #  [id number]  Interpreting Physician      Ruby Jurado   Fellow                   Referring Nurse                           Practitioner   Interpreting             Referring Physician         Nelson Steel MD  Fellow  Type of Study   TTE procedure:2D Echocardiogram, M-Mode, Doppler, Color Doppler, Bubble  Study. Procedure Date Date: 12/11/2020 Start: 11:21 AM Study Location: [facility] Technical Quality: Good visualization History / Tech. Comments: Procedure explained to patient. Study done bedside in Neuro ICU. Altered mental status, brain mass Patient Status: Inpatient Height: 74 inches Weight: 200 pounds BSA: 2.17 m^2 BMI: 25.68 kg/m^2 CONCLUSIONS Summary Left ventricle is normal in size. Global left ventricular systolic function is normal. Estimated ejection fraction is 55 % . Normal left ventricular wall thickness. No obvious wall motion abnormality seen.  Signature ----------------------------------------------------------------------------  Electronically signed by Jamila Posada on 12/11/2020  01:14 PM ---------------------------------------------------------------------------- ----------------------------------------------------------------------------  Electronically signed by Ruby Jurado(Interpreting physician) on 2020 03:30 PM ---------------------------------------------------------------------------- FINDINGS Left Atrium Left atrium is normal in size. Inter-atrial septum appears so be intact. No shunt seen by color Doppler. Negative bubble study, no shunt noted via injection of agitated saline. Left Ventricle Left ventricle is normal in size. Global left ventricular systolic function is normal. Estimated ejection fraction is 55 % . Normal left ventricular wall thickness. No obvious wall motion abnormality seen. Right Atrium Right atrium is normal in size. Right Ventricle Normal right ventricular size and function. Mitral Valve Normal mitral valve structure. No mitral regurgitation. No mitral stenosis. Aortic Valve Aortic valve structure and function normal. Aortic valve is trileaflet. Trivial aortic insufficiency. No aortic stenosis. Tricuspid Valve Normal tricuspid valve leaflets. Trivial tricuspid regurgitation. No tricuspid stenosis. Estimated right ventricular systolic pressure is 27 mmHg. Pulmonic Valve Pulmonic valve is normal in structure and function. No pulmonic insufficiency. No evidence of pulmonic stenosis. Pericardial Effusion No pericardial effusion. Miscellaneous Normal aortic root dimension. E/E' average = 8.85. IVC Increased diameter, but still has inspiratory variation suggesting upper normal or mildly elevated RA filling pressure (i.e. CVP) .  M-mode / 2D Measurements & Calculations:   LVIDd:4.8 cm(3.7 - 5.6 cm)        Diastolic HRQNRP:354 ml  WULVZ:3.5 cm(2.2 - 4.0 cm)        Systolic HQDWXW:62.1 ml  BCRK:3.5 cm(0.6 - 1.1 cm)         Aortic Root:3.3 cm(2.0 - 3.7 cm)  LVPWd:0.9 cm(0.6 - 1.1 cm)        LA Dimension: 3 cm(1.9 - 4.0 cm)  Fractional Shortenin.08 %     LA volume/Index: 18.15 ml /8m^2  Calculated LVEF (%): 61.35 %      LVOT:1.9 cm                                    RVDd:3.4 cm   Mitral:                                 Aortic   Valve Area (P1/2-Time): 3.73 cm^2       Peak Velocity: 1.03 m/s  Peak E-Wave: 0.57 m/s                   Mean Velocity: 0.66 m/s  Peak A-Wave: 0.82 m/s                   Peak Gradient: 4.24 mmHg  E/A Ratio: 0.69                         Mean Gradient: 3 mmHg  Peak Gradient: 1.28 mmHg  Mean Gradient: 2 mmHg  Deceleration Time: 197 msec             Area (continuity): 3.05 cm^2  P1/2t: 59 msec                          AV VTI: 22.6 cm   Area (continuity): 3.03 cm^2  Mean Velocity: 0.56 m/s   Tricuspid:                              Pulmonic:   Estimated RVSP: 27 mmHg                 Peak Velocity: 1.03 m/s  Peak TR Velocity: 2.04 m/s              Peak Gradient: 4.24 mmHg  Peak TR Gradient: 16.6464 mmHg  Estimated RA Pressure: 10 mmHg                                           Estimated PASP: 26.65 mmHg  Diastology / Tissue Doppler Septal Wall E' velocity:0.05 m/s Septal Wall E/E':12.4 Lateral Wall E' velocity:0.11 m/s Lateral Wall E/E':5.3    Ct Head Wo Contrast    Result Date: 12/9/2020 EXAMINATION: CT OF THE HEAD WITHOUT CONTRAST; CTA OF THE HEAD AND NECK WITH CONTRAST 12/9/2020 1:46 pm; 12/9/2020 1:59 pm: TECHNIQUE: CT of the head was performed without the administration of intravenous contrast. Dose modulation, iterative reconstruction, and/or weight based adjustment of the mA/kV was utilized to reduce the radiation dose to as low as reasonably achievable.; CTA of the head and neck was performed with the administration of intravenous contrast. Multiplanar reformatted images are provided for review. MIP images are provided for review. Stenosis of the internal carotid arteries measured using NASCET criteria. Dose modulation, iterative reconstruction, and/or weight based adjustment of the mA/kV was utilized to reduce the radiation dose to as low as reasonably achievable. Noncontrast CT of the head with reconstructed 2-D images are also provided for review. COMPARISON: None. HISTORY: ORDERING SYSTEM PROVIDED HISTORY: ams stroke TECHNOLOGIST PROVIDED HISTORY: [facility] stroke Reason for Exam: ams Acuity: Acute Type of Exam: Initial; ORDERING SYSTEM PROVIDED HISTORY: stroke TECHNOLOGIST PROVIDED HISTORY: stroke Reason for Exam: ams Acuity: Acute Type of Exam: Initial FINDINGS: CT HEAD: BRAIN/VENTRICLES:  No acute intracranial hemorrhage or extraaxial fluid collection. Grey-white differentiation is maintained. No hydrocephalus is evident. There is a 3 cm mass within the posterior left temporal lobe with surrounding vasogenic edema. Slight midline shift from left to right is identified. ORBITS: The visualized portion of the orbits demonstrate no acute abnormality. SINUSES:  There is opacification of the right posterior ethmoid air cells and right sphenoid sinus. The mastoid air cells are well pneumatized SOFT TISSUES/SKULL: No acute abnormality of the visualized skull or soft tissues. CTA NECK: AORTIC ARCH/ARCH VESSELS: No dissection or arterial injury.   No significant stenosis of the brachiocephalic or subclavian arteries. CAROTID ARTERIES: No dissection, arterial injury, or hemodynamically significant stenosis by NASCET criteria. VERTEBRAL ARTERIES: No dissection, arterial injury, or significant stenosis. SOFT TISSUES: The lung apices are clear. No cervical or superior mediastinal lymphadenopathy. The larynx and pharynx are unremarkable. No acute abnormality of the salivary and thyroid glands. BONES: No acute osseous abnormality. CTA HEAD: ANTERIOR CIRCULATION: No significant stenosis of the intracranial internal carotid, anterior cerebral, or middle cerebral arteries. No aneurysm. POSTERIOR CIRCULATION: No significant stenosis of the vertebral, basilar, or posterior cerebral arteries. No aneurysm. OTHER: No dural venous sinus thrombosis on this non-dedicated study     1. Noncontrast CT of the head demonstrates presence of a 3 cm left posterior temporal lobe mass with surrounding vasogenic edema. Minimal midline shift from left to right is noted. 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 MRI of the brain with contrast is recommended. 2. Unremarkable CTA of the head and neck. The findings were sent to the Radiology Results [id number] at 2:15 pm on 12/9/2020to be communicated to a licensed caregiver.      Ct Cervical Spine Wo Contrast    Result Date: 12/9/2020 EXAMINATION: CT OF THE HEAD WITH CONTRAST WITH PERFUSION 12/9/2020 3:19 pm: TECHNIQUE: CT of the head/brain was performed with the administration of intravenous contrast. Multiplanar reformatted images are provided for review. MIP images are provided for review. Dose modulation, iterative reconstruction, and/or weight based adjustment of the mA/kV was utilized to reduce the radiation dose to as low as reasonably achievable. COMPARISON: CTA head and CT head from today HISTORY: ORDERING SYSTEM PROVIDED HISTORY: stroke TECHNOLOGIST PROVIDED HISTORY: stroke FINDINGS: CT PERFUSION: There is symmetric perfusion to the brain without a perfusion mismatch. There is ring-like matching increased cerebral blood volume, cerebral blood flow, and mean transit time in the left temporal lobe mass. 1. No perfusion mismatch. 2. Left temporal lobe mass demonstrates matching increased cerebral blood flow cerebral blood volume and mean transit time.      Cta Head Neck W Contrast    Result Date: 12/9/2020 EXAMINATION: CT OF THE HEAD WITHOUT CONTRAST; CTA OF THE HEAD AND NECK WITH CONTRAST 12/9/2020 1:46 pm; 12/9/2020 1:59 pm: TECHNIQUE: CT of the head was performed without the administration of intravenous contrast. Dose modulation, iterative reconstruction, and/or weight based adjustment of the mA/kV was utilized to reduce the radiation dose to as low as reasonably achievable.; CTA of the head and neck was performed with the administration of intravenous contrast. Multiplanar reformatted images are provided for review. MIP images are provided for review. Stenosis of the internal carotid arteries measured using NASCET criteria. Dose modulation, iterative reconstruction, and/or weight based adjustment of the mA/kV was utilized to reduce the radiation dose to as low as reasonably achievable. Noncontrast CT of the head with reconstructed 2-D images are also provided for review. COMPARISON: None. HISTORY: ORDERING SYSTEM PROVIDED HISTORY: ams stroke TECHNOLOGIST PROVIDED HISTORY: [facility] stroke Reason for Exam: ams Acuity: Acute Type of Exam: Initial; ORDERING SYSTEM PROVIDED HISTORY: stroke TECHNOLOGIST PROVIDED HISTORY: stroke Reason for Exam: ams Acuity: Acute Type of Exam: Initial FINDINGS: CT HEAD: BRAIN/VENTRICLES:  No acute intracranial hemorrhage or extraaxial fluid collection. Grey-white differentiation is maintained. No hydrocephalus is evident. There is a 3 cm mass within the posterior left temporal lobe with surrounding vasogenic edema. Slight midline shift from left to right is identified. ORBITS: The visualized portion of the orbits demonstrate no acute abnormality. SINUSES:  There is opacification of the right posterior ethmoid air cells and right sphenoid sinus. The mastoid air cells are well pneumatized SOFT TISSUES/SKULL: No acute abnormality of the visualized skull or soft tissues. CTA NECK: AORTIC ARCH/ARCH VESSELS: No dissection or arterial injury.   No significant stenosis of the brachiocephalic or subclavian arteries. CAROTID ARTERIES: No dissection, arterial injury, or hemodynamically significant stenosis by NASCET criteria. VERTEBRAL ARTERIES: No dissection, arterial injury, or significant stenosis. SOFT TISSUES: The lung apices are clear. No cervical or superior mediastinal lymphadenopathy. The larynx and pharynx are unremarkable. No acute abnormality of the salivary and thyroid glands. BONES: No acute osseous abnormality. CTA HEAD: ANTERIOR CIRCULATION: No significant stenosis of the intracranial internal carotid, anterior cerebral, or middle cerebral arteries. No aneurysm. POSTERIOR CIRCULATION: No significant stenosis of the vertebral, basilar, or posterior cerebral arteries. No aneurysm. OTHER: No dural venous sinus thrombosis on this non-dedicated study     1. Noncontrast CT of the head demonstrates presence of a 3 cm left posterior temporal lobe mass with surrounding vasogenic edema. Minimal midline shift from left to right is noted. 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 MRI of the brain with contrast is recommended. 2. Unremarkable CTA of the head and neck. The findings were sent to the Radiology Results [id number] at 2:15 pm on 12/9/2020to be communicated to a licensed caregiver.      Ct Chest Abdomen Pelvis Wo Contrast    Result Date: 12/9/2020 EXAMINATION: CT OF THE CHEST, ABDOMEN, AND PELVIS WITHOUT CONTRAST 12/9/2020 4:37 pm TECHNIQUE: CT of the chest, abdomen and pelvis was performed without the administration of intravenous contrast. Multiplanar reformatted images are provided for review. Dose modulation, iterative reconstruction, and/or weight based adjustment of the mA/kV was utilized to reduce the radiation dose to as low as reasonably achievable. Residual intravenous contrast noted in the renal collecting systems. COMPARISON: Chest x-ray December 9, 2020 HISTORY: ORDERING SYSTEM PROVIDED HISTORY: abd pain TECHNOLOGIST PROVIDED HISTORY: abd pain FINDINGS: Chest: Mediastinum: Calcific coronary artery disease. Heart size is normal. There is no pericardial effusion. Without intravenous contrast, evaluation for adenopathy is of lower sensitivity. Within the limitations of a noncontrast exam, there is no evidence of hilar or mediastinal lymphadenopathy. Lungs/pleura: Clear Soft Tissues/Bones: Normal Abdomen/Pelvis: Organs: Fat containing umbilical hernia contains knuckle of small bowel without evidence of obstruction. The liver, spleen, pancreas, and adrenals appear normal.  Gallbladder normal. Kidneys appear normal.  Residual intravenous contrast within the collecting systems. Bladder is filled with hyperdense contrast.  Prostate is enlarged and nodular. Fat containing left inguinal hernia. GI/Bowel: The stomach,small bowel, and colon appear normal. Appendix is not well visualized. Pelvis: Normal Peritoneum/Retroperitoneum: The abdominal aorta and iliac arteries are normal in caliber. There is no pathologic adenopathy. Retroaortic left renal vein. Bones/Soft Tissues: Multilevel vacuum disc phenomena. 1 cm anterior subluxation L5 on S1 with bilateral pars defects. No acute disease. Incidental findings as above.      Mri Brain W Wo Contrast    Result Date: 12/9/2020 EXAMINATION: MRI OF THE BRAIN WITHOUT AND WITH CONTRAST  12/9/2020 6:23 pm TECHNIQUE: Multiplanar multisequence MRI of the head/brain was performed without and with the administration of intravenous contrast. COMPARISON: CT brain from today HISTORY: ORDERING SYSTEM PROVIDED HISTORY: stroke/seizure TECHNOLOGIST PROVIDED HISTORY: stroke/seizure Reason for Exam: stroke/seizure, eval for tumor FINDINGS: INTRACRANIAL STRUCTURES/VENTRICLES:  There is no acute infarct. There is a intra-axial left temporal mass with peripheral enhancement measuring 32 x 23 mm in greatest transverse dimensions. There is local edema and 3-4 mm midline shift. There is loss of signal on T2 star. There appears to be increased and decreased diffusion. The ventricles and sulci are normal in size and configuration. The sellar/suprasellar regions appear unremarkable. The normal signal voids within the major intracranial vessels appear maintained. No abnormal focus of enhancement is seen within the brain. ORBITS: The visualized portion of the orbits demonstrate no acute abnormality. SINUSES: The visualized paranasal sinuses and mastoid air cells are well aerated. BONES/SOFT TISSUES: The bone marrow signal intensity appears normal. The soft tissues demonstrate no acute abnormality. Ring-enhancing left temporal lobe mass consistent with abscess versus primary or secondary neoplasm. Local edema and slight midline shift. Mixed cytotoxic and vasogenic edema. Microscopic hemorrhage.      Ct Lumbar Spine Trauma Reconstruction    Result Date: 12/9/2020 EXAMINATION: CT OF THE THORACIC SPINE WITHOUT CONTRAST; CT OF THE LUMBAR SPINE WITHOUT CONTRAST  12/9/2020 TECHNIQUE: CT of the thoracic spine was performed without the administration of intravenous contrast. Multiplanar reformatted images are provided for review. Dose modulation, iterative reconstruction, and/or weight based adjustment of the mA/kV was utilized to reduce the radiation dose to as low as reasonably achievable.; CT of the lumbar spine was performed without the administration of intravenous contrast. Multiplanar reformatted images are provided for review. Dose modulation, iterative reconstruction, and/or weight based adjustment of the mA/kV was utilized to reduce the radiation dose to as low as reasonably achievable. COMPARISON: None HISTORY: ORDERING SYSTEM PROVIDED HISTORY: BLE weakness TECHNOLOGIST PROVIDED HISTORY: BLE weakness; ORDERING SYSTEM PROVIDED HISTORY: LOWER EXTREMITY WEAKNESS TECHNOLOGIST PROVIDED HISTORY: BLE weakness FINDINGS: CT thoracic spine: BONES/ALIGNMENT: There is normal alignment of the spine. The vertebral body heights are maintained. No osseous destructive lesion is seen. DEGENERATIVE CHANGES: Diffuse mild degenerative and degenerative disc changes are noted. No gross spinal canal stenosis or bony neural foraminal narrowing of the thoracic spine. SOFT TISSUES: No paraspinal mass is seen. CT lumbar spine: BONES/ALIGNMENT: A 6 mm grade 1 anterolisthesis of L5 upon S1 is noted with bilateral nonacute L5 spondylolysis. Mild grade 1 retrolisthesis L4 upon L5 is present. No acute fracture is evident. DEGENERATIVE CHANGES: Mild-to-moderate degenerative and degenerative disc changes are present most significant at L2-L3, L3-L4 and L5-S1 with vacuum phenomenon. Multilevel disc protrusions are noted with disc protrusions noted L2-L3 through L5-S1. Exiting nerve roots at L2-L3 and L3-L4 elevated but not impinged.   Exiting nerve roots are elevated and appear borderline impinged at L5-S1. No gross bony canal stenosis is evident. SOFT TISSUES/RETROPERITONEUM: No paraspinal mass is seen. CT thoracic spine: Degenerative and degenerative disc disease. No acute fracture or traumatic malalignment. CT lumbar spine: No acute fracture. Patient has an anterolisthesis L5 upon S1 and bilateral chronic spondylosis. Multilevel degenerative and degenerative disc changes are noted as above. Multilevel disc bulges. Exiting L5-S1 nerve roots are elevated and borderline impinged.      Ct Thoracic Spine Trauma Reconstruction    Result Date: 12/9/2020 Thank you for allowing me to participate in the care of this patient, please do not hesitate to call if you have any questions. Raul Booth, [id number] [facility]  [facility]  [phone]

## 2020-12-13 NOTE — PROGRESS NOTES
Daily Progress Note  Neuro Critical Care    Patient Name: Denice Marc  Patient : 1945  Room/Bed: 0971/8296-13  Code Status: Full  Allergies: No Known Allergies    CHIEF COMPLAINT:      AMS     INTERVAL HISTORY    Initial Presentation (Admitted 20): The patient is a 73 y. o. male who presented to the emergency department for altered mental status.  Supposedly making Facebook posts last night that did not make sense. Dom Kennedy was concerned and called her sister who lives closer to patient. Lio Ocasio spoke to patient on the phone this afternoon and noted garbled speech and the patient suddenly stopped talking.  EMS was called.  Found patient altered with decreased responsiveness.  Also concern for left-sided facial droop.  Transported via [facility] with concern for stroke.  Vital stable in route with exception of mild hypertension.  Glucose within normal limits.  Stroke alert called upon arrival. Alfred Guerin last known well.  GCS between 8 and 10 upon arrival; protecting airway.  Initial [facility] per stroke note of 20 for the following deficits: level of consciousness, facial palsy, left arm drift, weakness in lower extremities bilaterally, sensory loss, aphasia.  Was taken immediately for CT head which demonstrated left posterior temporal lobe mass of approximately 3 cm with surrounding vasogenic edema and minimal midline shift.  Neurosurgery team consulted. Meryle Charleston for MRI with and without contrast.  Hold steroids at this time.  Concern for possible seizure-like activity was loaded with 1 g of Keppra.  Labs significant for mild hyponatremia 133 as well as mild elevation in myoglobin at 111.  Also had mild troponin elevation but repeat troponin trending downward.  Family contacted by ER resident. Jose Jones known medical problems include hypertension and possible dementia per ED note.  No additional medical information on our records or in [facility] Course: 12/10: No acute events overnight. More alert this morning. A/O to person and can state that he is in hospital. Speaking in short sentences with mild dysarthria and broken speech. Persistent facial droop. Following commands. No pronator drift. Mild coughing with bedside swallow; speech to perform formal swallow study later today. Last 24h:   No acute events overnight. Doing well, Keppra and decadron continued. Neurosurgery planning for resection and biopsy on . Cardiology consulted for clearance. CURRENT MEDICATIONS:  SCHEDULED MEDICATIONS:   insulin lispro  0-6 Units Subcutaneous TID WC    enoxaparin  30 mg Subcutaneous BID    potassium bicarb-citric acid  40 mEq Oral Daily    dexamethasone  4 mg Intravenous Q6H    levetiracetam  500 mg Intravenous Q12H    famotidine (PEPCID) injection  20 mg Intravenous BID    atorvastatin  40 mg Oral Nightly     CONTINUOUS INFUSIONS:   dextrose      sodium chloride 75 mL/hr at 20 0216     PRN MEDICATIONS:   glucose, dextrose, glucagon (rDNA), dextrose, labetalol, sodium chloride flush    VITALS:  Temperature Range: Temp: 99 °F (37.2 °C) Temp  Av.8 °F (36.6 °C)  Min: 97.4 °F (36.3 °C)  Max: 99 °F (37.2 °C)  BP Range: Systolic (47FMD), IFZ:597 , Min:100 , YFM:069     Diastolic (45DNR), NFL:95, Min:39, Max:84    Pulse Range: Pulse  Av.6  Min: 58  Max: 83  Respiration Range: Resp  Av  Min: 18  Max: 24  Current Pulse Ox: SpO2: 98 %  24HR Pulse Ox Range: SpO2  Av.6 %  Min: 91 %  Max: 99 %  Patient Vitals for the past 12 hrs:   BP Temp Temp src Pulse Resp SpO2   20 1613 135/75 99 °F (37.2 °C) Oral 83 24 98 %   20 1144 103/84 97.5 °F (36.4 °C) Oral 76 20 99 %   20 0757 (!) 114/39 97.4 °F (36.3 °C) Oral 73 19 96 %     Estimated body mass index is 25.68 kg/m² as calculated from the following:    Height as of this encounter: 6' 2\" (1.88 m). Weight as of this encounter: 200 lb (90.7 kg). []<16 Severe malnutrition  []1616.99 Moderate malnutrition  []1718.49 Mild malnutrition  []18.524.9 Normal  [x]2529.9 Overweight (not obese)  []3034.9 Obese class 1 (Low Risk)  []3539.9 Obese class 2 (Moderate Risk)  []?40 Obese class 3 (High Risk)    RECENT LABS:   Lab Results   Component Value Date    WBC 12.5 (H) 12/12/2020    HGB 14.2 12/12/2020    HCT 42.7 12/12/2020     12/12/2020    HDL 37 (L) 12/09/2020    ALT 28 12/09/2020    AST 23 12/09/2020     (L) 12/12/2020    K 4.2 12/12/2020     12/12/2020    CREATININE 1.02 12/12/2020    BUN 28 (H) 12/12/2020    CO2 21 12/12/2020    TSH 2.76 12/09/2020    INR 1.0 12/09/2020    LABA1C 5.5 12/10/2020     24 HOUR INTAKE/OUTPUT:No intake or output data in the 24 hours ending 12/12/20 1953    IMAGING:   MRI BRAIN W WO CONTRAST   Final Result   Ring-enhancing left temporal lobe mass consistent with abscess versus primary   or secondary neoplasm. Local edema and slight midline shift. Mixed   cytotoxic and vasogenic edema. Microscopic hemorrhage. CT CHEST ABDOMEN PELVIS WO CONTRAST   Final Result   No acute disease. Incidental findings as above. CT LUMBAR SPINE TRAUMA RECONSTRUCTION   Final Result   CT thoracic spine: Degenerative and degenerative disc disease. No acute   fracture or traumatic malalignment. CT lumbar spine: No acute fracture. Patient has an anterolisthesis L5 upon   S1 and bilateral chronic spondylosis. Multilevel degenerative and   degenerative disc changes are noted as above. Multilevel disc bulges. Exiting L5-S1 nerve roots are elevated and borderline impinged. CT THORACIC SPINE TRAUMA RECONSTRUCTION   Final Result   CT thoracic spine: Degenerative and degenerative disc disease. No acute   fracture or traumatic malalignment. CT lumbar spine: No acute fracture. Patient has an anterolisthesis L5 upon   S1 and bilateral chronic spondylosis.   Multilevel degenerative and degenerative disc changes are noted as above. Multilevel disc bulges. Exiting L5-S1 nerve roots are elevated and borderline impinged. CT CERVICAL SPINE WO CONTRAST   Final Result   No acute abnormality of the cervical spine. XR CHEST PORTABLE   Final Result   No acute cardiopulmonary abnormality. CT BRAIN PERFUSION   Final Result   1. No perfusion mismatch. 2. Left temporal lobe mass demonstrates matching increased cerebral blood   flow cerebral blood volume and mean transit time. CTA HEAD NECK W CONTRAST   Final Result   1. Noncontrast CT of the head demonstrates presence of a 3 cm left posterior   temporal lobe mass with surrounding vasogenic edema. Minimal midline shift   from left to right is noted. 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 MRI of the brain with contrast is recommended. 2. Unremarkable CTA of the head and neck. The findings were sent to the Radiology Results [address] at 2:15   pm on 12/9/2020to be communicated to a licensed caregiver. CT HEAD WO CONTRAST   Final Result   1. Noncontrast CT of the head demonstrates presence of a 3 cm left posterior   temporal lobe mass with surrounding vasogenic edema. Minimal midline shift   from left to right is noted. 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 MRI of the brain with contrast is recommended. 2. Unremarkable CTA of the head and neck. The findings were sent to the Radiology Results [address] at 2:15   pm on 12/9/2020to be communicated to a licensed caregiver. Labs and Images reviewed with:  [x] Dr. Kayla Barrow. Roberta    [] Dr. Pepe Coughlin  [] Dr. Aleman Fail  [] There are no new interval images to review. PHYSICAL EXAM       CONSTITUTIONAL:  Well developed, well nourished, alert and oriented x 2, in no acute distress. GCS 15. Nontoxic. Dysarthric speech. No aphasia   HEAD:  normocephalic, atraumatic    EYES:  PERRLA, EOMI. ENT:  moist mucous membranes   NECK:  supple, symmetric   LUNGS:  Equal air entry bilaterally   CARDIOVASCULAR:  normal s1 / s2, RRR, distal pulses intact   ABDOMEN:  Soft, no rigidity   NEUROLOGIC:  Mental Status:  Not oriented to date,awake             Cranial Nerves:    III: Pupils:  equal, round, reactive to light  III,IV,VI: Extra Ocular Movements: intact  VII: Facial strength: abnormal mild left facial droop    Motor Exam:    Drift:  absent  Tone:  normal    Antigravity in all extremities. DRAINS:  [x] There are no drains for Neuro Critical Care to monitor at this time. ASSESSMENT AND PLAN:       This is a 73 y. o. male with altered mental status. Initially stroke alert due to dysarthria and concern for unilateral deficits. Found to have a 3cm left temporal lobe brain mass, vasogenic edema, minimal midline shift. MRI showed ring enhancing lesion concerning for mass vs abscess.      NEUROLOGIC:  - CT head: left posterior temporal lobe bass approximately 3cm in diameter with surrounding vasogenic edema and minimal midline shift.  - AEDs: loaded with 1g keppra in ED; will continue keppra 500mg BID  - LTME negative for seizures  - Altered mental status of unclear etiology              - unclear last known well              - Brain mass on CT; concern for infectious etiology vs neoplasm              - MRI: Ring-enhancing left temporal lobe mass consistent with abscess versus neoplasm. Mixed cytotoxic and vasogenic edema with minimal midline shift. Microscopic hemorrhage.               -NS recommending mass biopsy on Monday 12/14              - Decadron 4mg Q6  - Goal SBP <160  - Neuro checks per protocol     CARDIOVASCULAR:  - Goal SBP <160  - Hx of HTN per family but unclear of home meds  - continue lipitor  - Lipid: mildly low HDL, otherwise unremarkable   - Trop 34 > 31 > 28  - Echocardiogram: EF 55%, normal function  - Cardiology consult for clearance per NSG  - Continue telemetry     PULMONARY:

## 2020-12-13 NOTE — PROGRESS NOTES
Occupational Therapy    Occupational Therapy Not Seen Note    DATE: 2020  Name: Ariane Perez  : 1945  MRN: [id number]    Patient not available for Occupational Therapy due to:    Patient Declined: Pt on phone upon [facility] entrance declining OT     Next Scheduled Treatment: Attempt at later date    Electronically signed by VISHAL Botello on 2020 at 11:45 AM

## 2020-12-13 NOTE — PROGRESS NOTES
Daily Progress Note  Neuro Critical Care    Patient Name: Torsten Harris  Patient : 1945  Room/Bed: 5733/7657-62  Code Status: Full  Allergies: No Known Allergies    CHIEF COMPLAINT:      AMS     INTERVAL HISTORY    Initial Presentation (Admitted 20): The patient is a 73 y. o. male who presented to the emergency department for altered mental status.  Supposedly making Facebook posts last night that did not make sense. Que Sweet was concerned and called her sister who lives closer to patient. Saji Mon spoke to patient on the phone this afternoon and noted garbled speech and the patient suddenly stopped talking.  EMS was called.  Found patient altered with decreased responsiveness.  Also concern for left-sided facial droop.  Transported via [facility] with concern for stroke.  Vital stable in route with exception of mild hypertension.  Glucose within normal limits.  Stroke alert called upon arrival. Onnie Kawasaki last known well.  GCS between 8 and 10 upon arrival; protecting airway.  Initial [facility] per stroke note of 20 for the following deficits: level of consciousness, facial palsy, left arm drift, weakness in lower extremities bilaterally, sensory loss, aphasia.  Was taken immediately for CT head which demonstrated left posterior temporal lobe mass of approximately 3 cm with surrounding vasogenic edema and minimal midline shift.  Neurosurgery team consulted. Danae Hurley for MRI with and without contrast.  Hold steroids at this time.  Concern for possible seizure-like activity was loaded with 1 g of Keppra.  Labs significant for mild hyponatremia 133 as well as mild elevation in myoglobin at 111.  Also had mild troponin elevation but repeat troponin trending downward.  Family contacted by ER resident. Marie Cameron known medical problems include hypertension and possible dementia per ED note.  No additional medical information on our records or in [facility] Course: 12/10: No acute events overnight. More alert this morning. A/O to person and can state that he is in hospital. Speaking in short sentences with mild dysarthria and broken speech. Persistent facial droop. Following commands. No pronator drift. Mild coughing with bedside swallow; speech to perform formal swallow study later today. : Cardiology consulted for surgical clearance. Last 24h:   Overnight, patient became bradycardic with HR in upper 30's while sleeping. Upon awakening, patient immediately rebounds to HR 70s and is asymptomatic. Continued on Decadron and Keppra. Plans for OR tomorrow with neurosurgery for resection and biopsy. Will transfer back to ICU and do Precedex trial to determine if patient will be able to tolerate an awake procedure with neurosurgery.      CURRENT MEDICATIONS:  SCHEDULED MEDICATIONS:   insulin lispro  0-6 Units Subcutaneous TID WC    enoxaparin  30 mg Subcutaneous BID    [Held by provider] potassium bicarb-citric acid  40 mEq Oral Daily    dexamethasone  4 mg Intravenous Q6H    levetiracetam  500 mg Intravenous Q12H    famotidine (PEPCID) injection  20 mg Intravenous BID    atorvastatin  40 mg Oral Nightly     CONTINUOUS INFUSIONS:   dextrose      sodium chloride 75 mL/hr at 20 0216     PRN MEDICATIONS:   glucose, dextrose, glucagon (rDNA), dextrose, labetalol, sodium chloride flush    VITALS:  Temperature Range: Temp: 98 °F (36.7 °C) Temp  Av.9 °F (36.6 °C)  Min: 97.5 °F (36.4 °C)  Max: 99 °F (37.2 °C)  BP Range: Systolic (17LNN), MIKE:212 , Min:101 , YQR:277     Diastolic (19VGH), PLF:08, Min:39, Max:84    Pulse Range: Pulse  Av.3  Min: 47  Max: 83  Respiration Range: Resp  Av  Min: 15  Max: 24  Current Pulse Ox: SpO2: 93 %  24HR Pulse Ox Range: SpO2  Av.5 %  Min: 93 %  Max: 99 %  Patient Vitals for the past 12 hrs:   BP Temp Temp src Pulse Resp SpO2   20 0633    (!) 47 15 93 % 12/13/20 0400 (!) 101/39 98 °F (36.7 °C)  66 19 95 %   12/12/20 2347 (!) 108/58 97.5 °F (36.4 °C) Oral 62 20 93 %     Estimated body mass index is 25.68 kg/m² as calculated from the following:    Height as of this encounter: 6' 2\" (1.88 m). Weight as of this encounter: 200 lb (90.7 kg).  []<16 Severe malnutrition  []1616.99 Moderate malnutrition  []1718.49 Mild malnutrition  []18.524.9 Normal  [x]2529.9 Overweight (not obese)  []3034.9 Obese class 1 (Low Risk)  []3539.9 Obese class 2 (Moderate Risk)  []?40 Obese class 3 (High Risk)    RECENT LABS:   Lab Results   Component Value Date    WBC 13.5 (H) 12/13/2020    HGB 15.0 12/13/2020    HCT 45.2 12/13/2020     12/13/2020    HDL 37 (L) 12/09/2020    ALT 28 12/09/2020    AST 23 12/09/2020     12/13/2020    K 4.0 12/13/2020     (H) 12/13/2020    CREATININE 0.85 12/13/2020    BUN 26 (H) 12/13/2020    CO2 21 12/13/2020    TSH 2.76 12/09/2020    INR 1.0 12/09/2020    LABA1C 5.5 12/10/2020     24 HOUR INTAKE/OUTPUT:    Intake/Output Summary (Last 24 hours) at 12/13/2020 5487  Last data filed at 12/12/2020 2236  Gross per 24 hour   Intake    Output 475 ml   Net -475 ml       IMAGING:   MRI BRAIN W WO CONTRAST   Final Result   Ring-enhancing left temporal lobe mass consistent with abscess versus primary   or secondary neoplasm. Local edema and slight midline shift. Mixed   cytotoxic and vasogenic edema. Microscopic hemorrhage. CT CHEST ABDOMEN PELVIS WO CONTRAST   Final Result   No acute disease. Incidental findings as above. CT LUMBAR SPINE TRAUMA RECONSTRUCTION   Final Result   CT thoracic spine: Degenerative and degenerative disc disease. No acute   fracture or traumatic malalignment. CT lumbar spine: No acute fracture. Patient has an anterolisthesis L5 upon   S1 and bilateral chronic spondylosis. Multilevel degenerative and   degenerative disc changes are noted as above. Multilevel disc bulges. Exiting L5-S1 nerve roots are elevated and borderline impinged. CT THORACIC SPINE TRAUMA RECONSTRUCTION   Final Result   CT thoracic spine: Degenerative and degenerative disc disease. No acute   fracture or traumatic malalignment. CT lumbar spine: No acute fracture. Patient has an anterolisthesis L5 upon   S1 and bilateral chronic spondylosis. Multilevel degenerative and   degenerative disc changes are noted as above. Multilevel disc bulges. Exiting L5-S1 nerve roots are elevated and borderline impinged. CT CERVICAL SPINE WO CONTRAST   Final Result   No acute abnormality of the cervical spine. XR CHEST PORTABLE   Final Result   No acute cardiopulmonary abnormality. CT BRAIN PERFUSION   Final Result   1. No perfusion mismatch. 2. Left temporal lobe mass demonstrates matching increased cerebral blood   flow cerebral blood volume and mean transit time. CTA HEAD NECK W CONTRAST   Final Result   1. Noncontrast CT of the head demonstrates presence of a 3 cm left posterior   temporal lobe mass with surrounding vasogenic edema. Minimal midline shift   from left to right is noted. 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 MRI of the brain with contrast is recommended. 2. Unremarkable CTA of the head and neck. The findings were sent to the Radiology Results [address] at 2:15   pm on 12/9/2020to be communicated to a licensed caregiver. CT HEAD WO CONTRAST   Final Result   1. Noncontrast CT of the head demonstrates presence of a 3 cm left posterior   temporal lobe mass with surrounding vasogenic edema. Minimal midline shift   from left to right is noted. 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 MRI of the brain with contrast is recommended. 2. Unremarkable CTA of the head and neck. The findings were sent to the Radiology Results [address] at 2:15   pm on 12/9/2020to be communicated to a licensed caregiver. Labs and Images reviewed with:  [x] Dr. Heike Almeida. Roberta    [] Dr. Alisa Ivan  [] Dr. Isai Wood  [] There are no new interval images to review. PHYSICAL EXAM       CONSTITUTIONAL:  Well developed, well nourished, alert and oriented x 2, in no acute distress. GCS 15. Nontoxic. Dysarthric speech. No aphasia   HEAD:  normocephalic, atraumatic    EYES:  PERRLA, EOMI.   ENT:  moist mucous membranes   NECK:  supple, symmetric   LUNGS:  Equal air entry bilaterally   CARDIOVASCULAR:  normal s1 / s2, RRR, distal pulses intact   ABDOMEN:  Soft, no rigidity   NEUROLOGIC:  Mental Status:  Not oriented to date,awake             Cranial Nerves:    III: Pupils:  equal, round, reactive to light  III,IV,VI: Extra Ocular Movements: intact  VII: Facial strength: abnormal mild left facial droop    Motor Exam:    Drift:  absent  Tone:  normal    5 out of 5 motor strength in all extremities with exception of 4+ out of 5 in bilateral lower extremities. DRAINS:  [x] There are no drains for Neuro Critical Care to monitor at this time. ASSESSMENT AND PLAN:       This is a 73 y. o. male with altered mental status. Initially stroke alert due to dysarthria and concern for unilateral deficits.  Found to have a 3cm left temporal lobe brain mass, vasogenic edema, minimal midline shift. MRI showed ring enhancing lesion concerning for mass vs abscess.      NEUROLOGIC:  - CT head: left posterior temporal lobe bass approximately 3cm in diameter with surrounding vasogenic edema and minimal midline shift.  - AEDs: loaded with 1g keppra in ED; will continue keppra 500mg BID  - LTME negative for seizures  - Altered mental status of unclear etiology              - unclear last known well              - Brain mass on CT; concern for infectious etiology vs neoplasm For any changes in exam or patient status please contact Neuro Critical Care.       MING Henson - CNP  Neuro Critical Care  Pager [phone]  12/13/2020     8:21 AM

## 2020-12-13 NOTE — PROGRESS NOTES
Neurosurgery Service  Resident Daily Progress Note   12/13/2020 8:12 AM     Subjective  Patient is seen and evaluated at bedside, chart reviewed  No acute events overnight. No new complaints. Cardiology consulted for clearance for surgery Monday    Objective    Vitals:    12/12/20 2000 12/12/20 2347 12/13/20 0400 12/13/20 0633   BP: 122/67 (!) 108/58 (!) 101/39    Pulse: 70 62 66 (!) 47   Resp: 16 20 19 15   Temp: 97.5 °F (36.4 °C) 97.5 °F (36.4 °C) 98 °F (36.7 °C)    TempSrc: Oral Oral     SpO2: 95% 93% 95% 93%   Weight:       Height:           Physical Exam:  Gen: Resting comfortably, no acute distress, oriented to self mild dysarthria  CV: RRR  Resp: CTAB  GI: Abdomen soft, non-tender  Skin: Cap refill< 2 seconds, surgical incision   Neuro:    A&Ox3   PERRL, EOMI   CNII-XII intact   Normal speech and mentation  No focal sensory or motor deficits    Labs:  Lab Results   Component Value Date    WBC 13.5 (H) 12/13/2020    HGB 15.0 12/13/2020    HCT 45.2 12/13/2020     12/13/2020    HDL 37 (L) 12/09/2020    ALT 28 12/09/2020    AST 23 12/09/2020     12/13/2020    K 4.0 12/13/2020     (H) 12/13/2020    CREATININE 0.85 12/13/2020    BUN 26 (H) 12/13/2020    CO2 21 12/13/2020    TSH 2.76 12/09/2020    INR 1.0 12/09/2020    LABA1C 5.5 12/10/2020           ASSESSMENT & PLAN:    Berto Suarez is a 66 y.o. male who presents with left posterior temporal lobe enhancing mass     -Plan for mass resection and biopsy on Monday  -Continue Decadron 4 mg every 6  -On Keppra 500 mg twice daily for seizure prevention  -Cardiology consulted for clearance for surgery on Monday       Please contact neurosurgery with any changes in patient's neurologic status.       Inder Wyatt MD  PGY-4 Neurology Resident Physician  Neurosurgery/Neuro Critical Care Team  Pager [phone] Quality 110: Preventive Care And Screening: Influenza Immunization: Influenza Immunization previously received during influenza season Quality 130: Documentation Of Current Medications In The Medical Record: Current Medications Documented Quality 131: Pain Assessment And Follow-Up: Pain assessment using a standardized tool is documented as negative, no follow-up plan required Detail Level: Detailed

## 2020-12-13 NOTE — FLOWSHEET NOTE
SPIRITUAL CARE DEPARTMENT - [address]  PROGRESS NOTE    Shift date: 12.9.2020  Shift day: Wednesday   Shift # 2    Room # 0522/0522-01   Name: Eva Holliday            Age: 66 y.o. Gender: male          Restoration: Confucianism   Place of Pentecostal: Unknown    Referral: Routine Visit    Admit Date & Time: 12/9/2020  1:32 PM    PATIENT/EVENT DESCRIPTION:  Eva Holliday is a 66 y.o. male who was brought in as a [address] [place]. Daughter Era Hope [phone]) has arrived with paperwork including Advanced Directive and DNR. SPIRITUAL ASSESSMENT/INTERVENTION:  Daughter appears to be coping but states that deep down she is anxious and wants to cry. Says that she is trying to maintain. She has documents including AD and DNR paperwork which is copied and in patient's paper chart.  escorted daughter to be with patient who appears to be calm and coping. Informed medical staff of documents with patient. Physician was bedside to update daughter. SPIRITUAL CARE FOLLOW-UP PLAN:  Chaplains will remain available to offer spiritual and emotional support as needed. Electronically signed by Philip Harris on 12/9/2020 at 8:47 PM.  [facility]  [phone]       12/09/20 1700   Encounter Summary   Services provided to: Family; Patient   Referral/Consult From: Nurse   Support System Children   Continue Visiting   ([phone])   Complexity of Encounter Moderate   Length of Encounter 30 minutes   Routine   Type Follow up   Assessment Calm; Approachable;Coping; Anxious   Intervention Active listening;Explored coping resources; Explored feelings, thoughts, concerns; Discussed illness/injury and it's impact   Outcome Expressed gratitude;Expressed feelings/needs/concerns;Engaged in conversation     Electronically signed by Fredy Britton on 12/9/2020 at 8:47 PM
SPIRITUAL CARE NOTE    Pt's daughter requested pre-op prayer with pt. Pt receptive to visit and prayer. Pt seemed appreciative of caring listening presence. Pt had difficulty getting words out, but seemed to know what he wanted to say. Pt indicated that his  would be here tomorrow for surgery. Pt was uncertain about his future--said daughter would be coming to live with him after surgery.     Margot Oneal
Chaplain Kevon, on 12/9/2020 at 3:07 PM.  [address] [facility]  [phone]

## 2020-12-14 ENCOUNTER — ANESTHESIA (OUTPATIENT)
Dept: OPERATING ROOM | Age: 75
DRG: 025 | End: 2020-12-14
Payer: MEDICARE

## 2020-12-14 ENCOUNTER — APPOINTMENT (OUTPATIENT)
Dept: CT IMAGING | Age: 75
DRG: 025 | End: 2020-12-14
Payer: MEDICARE

## 2020-12-14 VITALS — DIASTOLIC BLOOD PRESSURE: 62 MMHG | SYSTOLIC BLOOD PRESSURE: 105 MMHG | TEMPERATURE: 96 F | OXYGEN SATURATION: 94 %

## 2020-12-14 LAB
ABO/RH: NORMAL
ANION GAP SERPL CALCULATED.3IONS-SCNC: 8 MMOL/L (ref 9–17)
ANTIBODY SCREEN: NEGATIVE
ARM BAND NUMBER: NORMAL
BUN BLDV-MCNC: 24 MG/DL (ref 8–23)
BUN/CREAT BLD: ABNORMAL (ref 9–20)
CALCIUM SERPL-MCNC: 8.2 MG/DL (ref 8.6–10.4)
CHLORIDE BLD-SCNC: 105 MMOL/L (ref 98–107)
CO2: 19 MMOL/L (ref 20–31)
CREAT SERPL-MCNC: 0.78 MG/DL (ref 0.7–1.2)
EXPIRATION DATE: NORMAL
GFR AFRICAN AMERICAN: >60 ML/MIN
GFR NON-AFRICAN AMERICAN: >60 ML/MIN
GFR SERPL CREATININE-BSD FRML MDRD: ABNORMAL ML/MIN/{1.73_M2}
GFR SERPL CREATININE-BSD FRML MDRD: ABNORMAL ML/MIN/{1.73_M2}
GLUCOSE BLD-MCNC: 137 MG/DL (ref 70–99)
HCT VFR BLD CALC: 44.4 % (ref 40.7–50.3)
HEMOGLOBIN: 14.9 G/DL (ref 13–17)
MCH RBC QN AUTO: 31.6 PG (ref 25.2–33.5)
MCHC RBC AUTO-ENTMCNC: 33.6 G/DL (ref 28.4–34.8)
MCV RBC AUTO: 94.3 FL (ref 82.6–102.9)
NRBC AUTOMATED: 0 PER 100 WBC
PDW BLD-RTO: 12.8 % (ref 11.8–14.4)
PHOSPHORUS: 2.9 MG/DL (ref 2.5–4.5)
PLATELET # BLD: 162 K/UL (ref 138–453)
PMV BLD AUTO: 10.2 FL (ref 8.1–13.5)
POTASSIUM SERPL-SCNC: 3.9 MMOL/L (ref 3.7–5.3)
RBC # BLD: 4.71 M/UL (ref 4.21–5.77)
SODIUM BLD-SCNC: 132 MMOL/L (ref 135–144)
WBC # BLD: 10 K/UL (ref 3.5–11.3)

## 2020-12-14 PROCEDURE — 36415 COLL VENOUS BLD VENIPUNCTURE: CPT

## 2020-12-14 PROCEDURE — 88307 TISSUE EXAM BY PATHOLOGIST: CPT

## 2020-12-14 PROCEDURE — 6370000000 HC RX 637 (ALT 250 FOR IP): Performed by: NEUROLOGICAL SURGERY

## 2020-12-14 PROCEDURE — 2580000003 HC RX 258: Performed by: PHYSICIAN ASSISTANT

## 2020-12-14 PROCEDURE — 6360000002 HC RX W HCPCS: Performed by: NURSE ANESTHETIST, CERTIFIED REGISTERED

## 2020-12-14 PROCEDURE — 61510 CRNEC TREPH EXC BRN TUM STTL: CPT | Performed by: NEUROLOGICAL SURGERY

## 2020-12-14 PROCEDURE — 70450 CT HEAD/BRAIN W/O DYE: CPT

## 2020-12-14 PROCEDURE — 2500000003 HC RX 250 WO HCPCS: Performed by: PHYSICIAN ASSISTANT

## 2020-12-14 PROCEDURE — 2580000003 HC RX 258: Performed by: NURSE ANESTHETIST, CERTIFIED REGISTERED

## 2020-12-14 PROCEDURE — C9248 INJ, CLEVIDIPINE BUTYRATE: HCPCS | Performed by: STUDENT IN AN ORGANIZED HEALTH CARE EDUCATION/TRAINING PROGRAM

## 2020-12-14 PROCEDURE — 2780000010 HC IMPLANT OTHER: Performed by: NEUROLOGICAL SURGERY

## 2020-12-14 PROCEDURE — 3700000000 HC ANESTHESIA ATTENDED CARE: Performed by: NEUROLOGICAL SURGERY

## 2020-12-14 PROCEDURE — 7100000001 HC PACU RECOVERY - ADDTL 15 MIN: Performed by: NEUROLOGICAL SURGERY

## 2020-12-14 PROCEDURE — 88331 PATH CONSLTJ SURG 1 BLK 1SPC: CPT

## 2020-12-14 PROCEDURE — 2500000003 HC RX 250 WO HCPCS: Performed by: NEUROLOGICAL SURGERY

## 2020-12-14 PROCEDURE — 80048 BASIC METABOLIC PNL TOTAL CA: CPT

## 2020-12-14 PROCEDURE — 3600000004 HC SURGERY LEVEL 4 BASE: Performed by: NEUROLOGICAL SURGERY

## 2020-12-14 PROCEDURE — 85027 COMPLETE CBC AUTOMATED: CPT

## 2020-12-14 PROCEDURE — 7100000000 HC PACU RECOVERY - FIRST 15 MIN: Performed by: NEUROLOGICAL SURGERY

## 2020-12-14 PROCEDURE — 3700000001 HC ADD 15 MINUTES (ANESTHESIA): Performed by: NEUROLOGICAL SURGERY

## 2020-12-14 PROCEDURE — 6360000002 HC RX W HCPCS: Performed by: PHYSICIAN ASSISTANT

## 2020-12-14 PROCEDURE — 6360000002 HC RX W HCPCS: Performed by: STUDENT IN AN ORGANIZED HEALTH CARE EDUCATION/TRAINING PROGRAM

## 2020-12-14 PROCEDURE — C1713 ANCHOR/SCREW BN/BN,TIS/BN: HCPCS | Performed by: NEUROLOGICAL SURGERY

## 2020-12-14 PROCEDURE — 2709999900 HC NON-CHARGEABLE SUPPLY: Performed by: NEUROLOGICAL SURGERY

## 2020-12-14 PROCEDURE — 3600000014 HC SURGERY LEVEL 4 ADDTL 15MIN: Performed by: NEUROLOGICAL SURGERY

## 2020-12-14 PROCEDURE — 86901 BLOOD TYPING SEROLOGIC RH(D): CPT

## 2020-12-14 PROCEDURE — 86900 BLOOD TYPING SEROLOGIC ABO: CPT

## 2020-12-14 PROCEDURE — 2500000003 HC RX 250 WO HCPCS: Performed by: NURSE ANESTHETIST, CERTIFIED REGISTERED

## 2020-12-14 PROCEDURE — 2720000010 HC SURG SUPPLY STERILE: Performed by: NEUROLOGICAL SURGERY

## 2020-12-14 PROCEDURE — 86850 RBC ANTIBODY SCREEN: CPT

## 2020-12-14 PROCEDURE — 6370000000 HC RX 637 (ALT 250 FOR IP): Performed by: PHYSICIAN ASSISTANT

## 2020-12-14 PROCEDURE — 84100 ASSAY OF PHOSPHORUS: CPT

## 2020-12-14 PROCEDURE — 61781 SCAN PROC CRANIAL INTRA: CPT | Performed by: NEUROLOGICAL SURGERY

## 2020-12-14 PROCEDURE — 2000000003 HC NEURO ICU R&B

## 2020-12-14 PROCEDURE — APPNB45 APP NON BILLABLE 31-45 MINUTES: Performed by: NURSE PRACTITIONER

## 2020-12-14 PROCEDURE — 88341 IMHCHEM/IMCYTCHM EA ADD ANTB: CPT

## 2020-12-14 PROCEDURE — 88342 IMHCHEM/IMCYTCHM 1ST ANTB: CPT

## 2020-12-14 PROCEDURE — 2580000003 HC RX 258: Performed by: NEUROLOGICAL SURGERY

## 2020-12-14 PROCEDURE — 87086 URINE CULTURE/COLONY COUNT: CPT

## 2020-12-14 PROCEDURE — 2500000003 HC RX 250 WO HCPCS: Performed by: STUDENT IN AN ORGANIZED HEALTH CARE EDUCATION/TRAINING PROGRAM

## 2020-12-14 PROCEDURE — 99233 SBSQ HOSP IP/OBS HIGH 50: CPT | Performed by: PSYCHIATRY & NEUROLOGY

## 2020-12-14 PROCEDURE — 6360000002 HC RX W HCPCS: Performed by: NEUROLOGICAL SURGERY

## 2020-12-14 PROCEDURE — 6360000002 HC RX W HCPCS

## 2020-12-14 DEVICE — LOW PROFILE BURR HOLE COVER, W/TAB, 14MM
Type: IMPLANTABLE DEVICE | Site: CRANIAL | Status: FUNCTIONAL
Brand: UNIVERSAL NEURO 2

## 2020-12-14 DEVICE — COLLAGEN DURAL REGENERATION MEMBRANE 2IN X 2IN (5CM X 5CM)
Type: IMPLANTABLE DEVICE | Site: BRAIN | Status: FUNCTIONAL
Brand: DURAMATRIX-ONLAY PLUS

## 2020-12-14 DEVICE — PLATE, RIGID
Type: IMPLANTABLE DEVICE | Site: CRANIAL | Status: FUNCTIONAL
Brand: UNIVERSAL NEURO 2, QUIKFLAP

## 2020-12-14 DEVICE — BONE SCREWS, CROSS-PIN, SELF-DRILLING: Type: IMPLANTABLE DEVICE | Site: CRANIAL | Status: FUNCTIONAL

## 2020-12-14 RX ORDER — MORPHINE SULFATE 4 MG/ML
4 INJECTION, SOLUTION INTRAMUSCULAR; INTRAVENOUS
Status: DISCONTINUED | OUTPATIENT
Start: 2020-12-14 | End: 2020-12-28 | Stop reason: HOSPADM

## 2020-12-14 RX ORDER — LEVETIRACETAM 10 MG/ML
INJECTION INTRAVASCULAR PRN
Status: DISCONTINUED | OUTPATIENT
Start: 2020-12-14 | End: 2020-12-14 | Stop reason: SDUPTHER

## 2020-12-14 RX ORDER — BISACODYL 10 MG
10 SUPPOSITORY, RECTAL RECTAL DAILY PRN
Status: DISCONTINUED | OUTPATIENT
Start: 2020-12-14 | End: 2020-12-28 | Stop reason: HOSPADM

## 2020-12-14 RX ORDER — LIDOCAINE HYDROCHLORIDE AND EPINEPHRINE 10; 10 MG/ML; UG/ML
INJECTION, SOLUTION INFILTRATION; PERINEURAL PRN
Status: DISCONTINUED | OUTPATIENT
Start: 2020-12-14 | End: 2020-12-14 | Stop reason: ALTCHOICE

## 2020-12-14 RX ORDER — DEXMEDETOMIDINE HYDROCHLORIDE 100 UG/ML
INJECTION, SOLUTION INTRAVENOUS PRN
Status: DISCONTINUED | OUTPATIENT
Start: 2020-12-14 | End: 2020-12-14

## 2020-12-14 RX ORDER — FENTANYL CITRATE 50 UG/ML
INJECTION, SOLUTION INTRAMUSCULAR; INTRAVENOUS PRN
Status: DISCONTINUED | OUTPATIENT
Start: 2020-12-14 | End: 2020-12-14 | Stop reason: SDUPTHER

## 2020-12-14 RX ORDER — BUPIVACAINE HYDROCHLORIDE AND EPINEPHRINE 5; 5 MG/ML; UG/ML
INJECTION, SOLUTION EPIDURAL; INTRACAUDAL; PERINEURAL PRN
Status: DISCONTINUED | OUTPATIENT
Start: 2020-12-14 | End: 2020-12-14 | Stop reason: ALTCHOICE

## 2020-12-14 RX ORDER — ONDANSETRON 2 MG/ML
4 INJECTION INTRAMUSCULAR; INTRAVENOUS EVERY 6 HOURS PRN
Status: DISCONTINUED | OUTPATIENT
Start: 2020-12-14 | End: 2020-12-28 | Stop reason: HOSPADM

## 2020-12-14 RX ORDER — SODIUM CHLORIDE 0.9 % (FLUSH) 0.9 %
10 SYRINGE (ML) INJECTION PRN
Status: DISCONTINUED | OUTPATIENT
Start: 2020-12-14 | End: 2020-12-28 | Stop reason: HOSPADM

## 2020-12-14 RX ORDER — ONDANSETRON 2 MG/ML
INJECTION INTRAMUSCULAR; INTRAVENOUS PRN
Status: DISCONTINUED | OUTPATIENT
Start: 2020-12-14 | End: 2020-12-14 | Stop reason: SDUPTHER

## 2020-12-14 RX ORDER — MORPHINE SULFATE 2 MG/ML
2 INJECTION, SOLUTION INTRAMUSCULAR; INTRAVENOUS
Status: DISCONTINUED | OUTPATIENT
Start: 2020-12-14 | End: 2020-12-28 | Stop reason: HOSPADM

## 2020-12-14 RX ORDER — MAGNESIUM HYDROXIDE 1200 MG/15ML
LIQUID ORAL CONTINUOUS PRN
Status: DISCONTINUED | OUTPATIENT
Start: 2020-12-14 | End: 2020-12-14 | Stop reason: ALTCHOICE

## 2020-12-14 RX ORDER — CEFAZOLIN SODIUM 1 G/3ML
INJECTION, POWDER, FOR SOLUTION INTRAMUSCULAR; INTRAVENOUS PRN
Status: DISCONTINUED | OUTPATIENT
Start: 2020-12-14 | End: 2020-12-14 | Stop reason: SDUPTHER

## 2020-12-14 RX ORDER — SENNA AND DOCUSATE SODIUM 50; 8.6 MG/1; MG/1
1 TABLET, FILM COATED ORAL 2 TIMES DAILY
Status: DISCONTINUED | OUTPATIENT
Start: 2020-12-14 | End: 2020-12-28 | Stop reason: HOSPADM

## 2020-12-14 RX ORDER — OXYCODONE HYDROCHLORIDE 5 MG/1
5 TABLET ORAL EVERY 4 HOURS PRN
Status: DISCONTINUED | OUTPATIENT
Start: 2020-12-14 | End: 2020-12-28 | Stop reason: HOSPADM

## 2020-12-14 RX ORDER — DEXAMETHASONE SODIUM PHOSPHATE 4 MG/ML
INJECTION, SOLUTION INTRA-ARTICULAR; INTRALESIONAL; INTRAMUSCULAR; INTRAVENOUS; SOFT TISSUE PRN
Status: DISCONTINUED | OUTPATIENT
Start: 2020-12-14 | End: 2020-12-14 | Stop reason: SDUPTHER

## 2020-12-14 RX ORDER — PROMETHAZINE HYDROCHLORIDE 12.5 MG/1
12.5 TABLET ORAL EVERY 6 HOURS PRN
Status: DISCONTINUED | OUTPATIENT
Start: 2020-12-14 | End: 2020-12-28 | Stop reason: HOSPADM

## 2020-12-14 RX ORDER — EPHEDRINE SULFATE/0.9% NACL/PF 50 MG/5 ML
SYRINGE (ML) INTRAVENOUS PRN
Status: DISCONTINUED | OUTPATIENT
Start: 2020-12-14 | End: 2020-12-14 | Stop reason: SDUPTHER

## 2020-12-14 RX ORDER — SODIUM CHLORIDE 0.9 % (FLUSH) 0.9 %
10 SYRINGE (ML) INJECTION EVERY 12 HOURS SCHEDULED
Status: DISCONTINUED | OUTPATIENT
Start: 2020-12-14 | End: 2020-12-28 | Stop reason: HOSPADM

## 2020-12-14 RX ORDER — SODIUM CHLORIDE 9 MG/ML
INJECTION, SOLUTION INTRAVENOUS CONTINUOUS PRN
Status: DISCONTINUED | OUTPATIENT
Start: 2020-12-14 | End: 2020-12-14 | Stop reason: SDUPTHER

## 2020-12-14 RX ORDER — OXYCODONE HYDROCHLORIDE 5 MG/1
10 TABLET ORAL EVERY 4 HOURS PRN
Status: DISCONTINUED | OUTPATIENT
Start: 2020-12-14 | End: 2020-12-28 | Stop reason: HOSPADM

## 2020-12-14 RX ORDER — LABETALOL HYDROCHLORIDE 5 MG/ML
10 INJECTION, SOLUTION INTRAVENOUS
Status: DISCONTINUED | OUTPATIENT
Start: 2020-12-14 | End: 2020-12-15

## 2020-12-14 RX ORDER — GINSENG 100 MG
CAPSULE ORAL PRN
Status: DISCONTINUED | OUTPATIENT
Start: 2020-12-14 | End: 2020-12-14 | Stop reason: ALTCHOICE

## 2020-12-14 RX ORDER — ACETAMINOPHEN 325 MG/1
650 TABLET ORAL EVERY 4 HOURS PRN
Status: DISCONTINUED | OUTPATIENT
Start: 2020-12-14 | End: 2020-12-28 | Stop reason: HOSPADM

## 2020-12-14 RX ADMIN — DESMOPRESSIN ACETATE 40 MG: 0.2 TABLET ORAL at 21:08

## 2020-12-14 RX ADMIN — CEFAZOLIN 2000 MG: 1 INJECTION, POWDER, FOR SOLUTION INTRAMUSCULAR; INTRAVENOUS at 13:05

## 2020-12-14 RX ADMIN — DEXAMETHASONE SODIUM PHOSPHATE 8 MG: 4 INJECTION, SOLUTION INTRAMUSCULAR; INTRAVENOUS at 08:20

## 2020-12-14 RX ADMIN — Medication 10 MG: at 18:59

## 2020-12-14 RX ADMIN — SODIUM CHLORIDE: 9 INJECTION, SOLUTION INTRAVENOUS at 18:41

## 2020-12-14 RX ADMIN — AMINOLEVULINIC ACID HYDROCHLORIDE 1815 MG: 1500 POWDER, FOR SOLUTION ORAL at 04:48

## 2020-12-14 RX ADMIN — CLEVIPIDINE 2 MG/HR: 0.5 EMULSION INTRAVENOUS at 21:52

## 2020-12-14 RX ADMIN — FENTANYL CITRATE 25 MCG: 50 INJECTION, SOLUTION INTRAMUSCULAR; INTRAVENOUS at 09:57

## 2020-12-14 RX ADMIN — Medication 5 MG: at 11:10

## 2020-12-14 RX ADMIN — FENTANYL CITRATE 25 MCG: 50 INJECTION, SOLUTION INTRAMUSCULAR; INTRAVENOUS at 09:44

## 2020-12-14 RX ADMIN — FENTANYL CITRATE 25 MCG: 50 INJECTION, SOLUTION INTRAMUSCULAR; INTRAVENOUS at 14:12

## 2020-12-14 RX ADMIN — LEVETIRACETAM 500 MG: 5 INJECTION INTRAVENOUS at 01:57

## 2020-12-14 RX ADMIN — SODIUM CHLORIDE: 9 INJECTION, SOLUTION INTRAVENOUS at 07:48

## 2020-12-14 RX ADMIN — LEVETIRACETAM 1000 MG: 10 INJECTION INTRAVENOUS at 08:02

## 2020-12-14 RX ADMIN — Medication 10 MG: at 20:00

## 2020-12-14 RX ADMIN — CEFAZOLIN 2000 MG: 1 INJECTION, POWDER, FOR SOLUTION INTRAMUSCULAR; INTRAVENOUS at 09:05

## 2020-12-14 RX ADMIN — SODIUM CHLORIDE, PRESERVATIVE FREE 10 ML: 5 INJECTION INTRAVENOUS at 22:12

## 2020-12-14 RX ADMIN — Medication 5 MG: at 09:09

## 2020-12-14 RX ADMIN — Medication 5 MG: at 10:46

## 2020-12-14 RX ADMIN — DEXTROSE MONOHYDRATE 2 G: 50 INJECTION, SOLUTION INTRAVENOUS at 21:10

## 2020-12-14 RX ADMIN — FENTANYL CITRATE 25 MCG: 50 INJECTION, SOLUTION INTRAMUSCULAR; INTRAVENOUS at 15:11

## 2020-12-14 RX ADMIN — ONDANSETRON 4 MG: 2 INJECTION INTRAMUSCULAR; INTRAVENOUS at 08:20

## 2020-12-14 RX ADMIN — OXYCODONE HYDROCHLORIDE 5 MG: 5 TABLET ORAL at 22:05

## 2020-12-14 RX ADMIN — SODIUM CHLORIDE: 9 INJECTION, SOLUTION INTRAVENOUS at 12:14

## 2020-12-14 RX ADMIN — STANDARDIZED SENNA CONCENTRATE AND DOCUSATE SODIUM 1 TABLET: 8.6; 5 TABLET ORAL at 21:08

## 2020-12-14 RX ADMIN — DEXAMETHASONE SODIUM PHOSPHATE 4 MG: 4 INJECTION, SOLUTION INTRAMUSCULAR; INTRAVENOUS at 01:56

## 2020-12-14 RX ADMIN — FAMOTIDINE 20 MG: 10 INJECTION INTRAVENOUS at 21:10

## 2020-12-14 ASSESSMENT — PULMONARY FUNCTION TESTS
PIF_VALUE: 1
PIF_VALUE: 0
PIF_VALUE: 1
PIF_VALUE: 0
PIF_VALUE: 1
PIF_VALUE: 0
PIF_VALUE: 1
PIF_VALUE: 0
PIF_VALUE: 1
PIF_VALUE: 0
PIF_VALUE: 1
PIF_VALUE: 0
PIF_VALUE: 1
PIF_VALUE: 1
PIF_VALUE: 0
PIF_VALUE: 1
PIF_VALUE: 1
PIF_VALUE: 0
PIF_VALUE: 1
PIF_VALUE: 0
PIF_VALUE: 1
PIF_VALUE: 0
PIF_VALUE: 1
PIF_VALUE: 0
PIF_VALUE: 1
PIF_VALUE: 0
PIF_VALUE: 1
PIF_VALUE: 0
PIF_VALUE: 1
PIF_VALUE: 0
PIF_VALUE: 1
PIF_VALUE: 0
PIF_VALUE: 1
PIF_VALUE: 2
PIF_VALUE: 1
PIF_VALUE: 0
PIF_VALUE: 1
PIF_VALUE: 1
PIF_VALUE: 0
PIF_VALUE: 1
PIF_VALUE: 0
PIF_VALUE: 1
PIF_VALUE: 0
PIF_VALUE: 1
PIF_VALUE: 0
PIF_VALUE: 1
PIF_VALUE: 0
PIF_VALUE: 1
PIF_VALUE: 0
PIF_VALUE: 1
PIF_VALUE: 0
PIF_VALUE: 1
PIF_VALUE: 0
PIF_VALUE: 1
PIF_VALUE: 0
PIF_VALUE: 1
PIF_VALUE: 0
PIF_VALUE: 1
PIF_VALUE: 0
PIF_VALUE: 1
PIF_VALUE: 0
PIF_VALUE: 1

## 2020-12-14 ASSESSMENT — PAIN - FUNCTIONAL ASSESSMENT: PAIN_FUNCTIONAL_ASSESSMENT: 0-10

## 2020-12-14 ASSESSMENT — PAIN SCALES - GENERAL: PAINLEVEL_OUTOF10: 4

## 2020-12-14 NOTE — BRIEF OP NOTE
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    Post-Op Diagnosis: Same       Procedure(s):  LEFT TEMPORAL CRANIOTOMY FOR RESECTION OF TUMOR WITH AWAKE SPEECH MAPPING  IMAGE AND 5-ALA GUIDED RESECTION    Surgeon(s):  Parris Dyson DO    Assistant:  First Assistant: Juvencio Wilhelm    Anesthesia: General    Estimated Blood Loss (mL): less than 819     Complications: None    Specimens:   ID Type Source Tests Collected by Time Destination   1 : URINE FROM INITIAL MOJICA INSERTION Urine Urine, indwelling catheter CULTURE, URINE Parris Dyson, DO 12/14/2020 1000    A : LEFT TEMPORAL MASS Tissue Brain SURGICAL PATHOLOGY Parris Dyson, DO 12/14/2020 1047    B : LEFT TEMPORAL MASS Tissue Brain SURGICAL PATHOLOGY Parris Dyson, DO 12/14/2020 1052    C : LEFT TEMPORAL MASS Tissue Brain SURGICAL PATHOLOGY Parris Dyson, DO 12/14/2020 1110    D : LEFT TEMPORAL MASS Tissue Brain SURGICAL PATHOLOGY Parris Dyson, DO 12/14/2020 1140    E : LEFT TEMPORAL MASS Tissue Brain SURGICAL PATHOLOGY Parris Dyson, DO 12/14/2020 1149        Implants:  Implant Name Type Inv.  Item Serial No.  Lot No. LRB No. Used Action   GRAFT DURA [id number] CLLGN SUTURELESS HIGHLY CNFRM RESTR - [id number]  GRAFT DURA [id number] CLLGN SUTURELESS HIGHLY CNFRM RESTR [id number] [employer] [id number] Left 1 Implanted   COVER BUR H [id number] CRANIOMAXILLOFACIAL PLT LO PROF W/ TAB  COVER BUR H [id number] CRANIOMAXILLOFACIAL PLT LO PROF W/ TAB  CARL CRANIOMAXILLOFACIAL-WD  Left 3 Implanted   SCREW BNE L5MM DIA1.5MM UNIV SELF DRL CRSS PIN  SCREW BNE L5MM DIA1.5MM UNIV SELF DRL CRSS PIN  CARL CRANIOMAXILLOFACIAL-WD  Left 14 Implanted PLATE BNE BAR [id number] 2 H CRANIOMAXILLOFACIAL TI RIG FOR UNIV  PLATE BNE BAR [id number] 2 H CRANIOMAXILLOFACIAL TI RIG FOR UNIV  CARL CRANIOMAXILLOFACIAL-WD  Left 1 Implanted         Drains:   Urethral Catheter Straight-tip 16 fr (Active)   Catheter Indications Perioperative use in selected surgeries including but not limited to urologic, pelvic or need for intraoperative monitoring of urinary output due to prolonged surgery, large volume infusion or need for diuretic therapy in surgery 12/14/20 1800   Site Assessment No urethral drainage 12/14/20 1800   Urine Color Yellow 12/14/20 1800   Output (mL) 200 mL 12/14/20 1730       Findings: malignant glioma. Stable neurologic exam through out the surgery.      Electronically signed by Tomer Kenny DO on 12/14/2020 at 6:15 PM

## 2020-12-14 NOTE — PLAN OF CARE
Problem: Falls - Risk of:  Goal: Will remain free from falls  Description: Will remain free from falls  Outcome: Met This Shift  Goal: Absence of physical injury  Description: Absence of physical injury  Outcome: Met This Shift   Pt remained free of falls during shift. Bed in lowest position, call light within reach, and bed wheels locked. Will continue to monitor. Problem: Skin Integrity:  Goal: Will show no infection signs and symptoms  Description: Will show no infection signs and symptoms  Outcome: Met This Shift  Goal: Absence of new skin breakdown  Description: Absence of new skin breakdown  Outcome: Met This Shift   Skin assessed for breakdown and redness, patient turned regularly, and heels elevated off of bed on pillows.

## 2020-12-14 NOTE — ANESTHESIA PRE PROCEDURE
Department of Anesthesiology  Preprocedure Note       Name:  Mil Alejandra   Age:  66 y.o.  :  1945                                          MRN:  [id number]         Date:  2020      Surgeon: Linnette Heard):  León Sotelo DO    Procedure: Procedure(s):  TEMPORAL CRANIOTOMY FOR TUMOR, IMAGE GUIDED  (MEDTRONIC, MICROSCOPE, ULTRASOUND, GLEOLAN,  [address]) AWAKE, MONITORING NOTIFIED, 0.5 MARCAINE, COLD LR OR SALINE, BED TURNED 90 DEGREE    Medications prior to admission:   Prior to Admission medications    Not on File       Current medications:    Current Facility-Administered Medications   Medication Dose Route Frequency Provider Last Rate Last Admin    [MAR Hold] dexamethasone (DECADRON) injection 4 mg  4 mg Intravenous Q8H León Sotelo, DO   4 mg at 20 0156    [MAR Hold] dexmedetomidine (PRECEDEX) 400 mcg in sodium chloride 0.9 % 100 mL infusion  0.2 mcg/kg/hr Intravenous Continuous Minooka Rave, DO   Stopped at 20 2232    [MAR Hold] insulin lispro (HUMALOG) injection vial 0-6 Units  0-6 Units Subcutaneous TID WC Naye Digioia, DO   1 Units at 20 1750    [MAR Hold] glucose (GLUTOSE) 40 % oral gel 15 g  15 g Oral PRN Naye Digioia, DO        [MAR Hold] dextrose 50 % IV solution  12.5 g Intravenous PRN Joao Clore, DO        [MAR Hold] glucagon (rDNA) injection 1 mg  1 mg Intramuscular PRN Joao Clore, DO        [MAR Hold] dextrose 5 % solution  100 mL/hr Intravenous PRN Joao Clore, DO        [Held by provider] enoxaparin (LOVENOX) injection 30 mg  30 mg Subcutaneous BID Minooka Rave, DO   30 mg at 20 0955    [Held by provider] potassium bicarb-citric acid (EFFER-K) effervescent tablet 40 mEq  40 mEq Oral Daily Minooka Rave, DO   40 mEq at 20 0904    [MAR Hold] 0.9 % sodium chloride infusion   Intravenous Continuous Naye Digioia, DO 75 mL/hr at 20 2100 New Bag at 20 2100  [MAR Hold] labetalol (NORMODYNE;TRANDATE) injection 10 mg  10 mg Intravenous Q10 Min PRN Sharlyne Peper, DO        [MAR Hold] levetiracetam (KEPPRA) 500 mg/100 mL IVPB  500 mg Intravenous Q12H Sharlyne Peper, DO   Stopped at 12/14/20 0215    [MAR Hold] famotidine (PEPCID) injection 20 mg  20 mg Intravenous BID Sharlyne Peper, DO   20 mg at 12/13/20 2100    [MAR Hold] atorvastatin (LIPITOR) tablet 40 mg  40 mg Oral Nightly Sharlyne Peper, DO   40 mg at 12/13/20 2100    [MAR Hold] sodium chloride flush 0.9 % injection 10 mL  10 mL Intravenous PRN Nkechi Rose MD   10 mL at 12/10/20 2100       Allergies:  No Known Allergies    Problem List:    Patient Active Problem List   Diagnosis Code    Brain mass G93.89    Acute encephalopathy G93.40    Cerebral edema ([address] G93.6       Past Medical History:  History reviewed. No pertinent past medical history. Past Surgical History:  History reviewed. No pertinent surgical history. Social History:    Social History     Tobacco Use    Smoking status: Unknown If Ever Smoked   Substance Use Topics    Alcohol use: Not on file     Comment: SAUNDRA                                 Counseling given: Not Answered      Vital Signs (Current):   Vitals:    12/14/20 0400 12/14/20 0500 12/14/20 0600 12/14/20 0722   BP: (!) 136/90 (!) 151/85 (!) 141/71 127/81   Pulse: 57 58 70 76   Resp: 14 15  16   Temp: 98.3 °F (36.8 °C)   97 °F (36.1 °C)   TempSrc: Oral   Temporal   SpO2: 96% 96% 96% 97%   Weight:       Height:                                                  BP Readings from Last 3 Encounters:   12/14/20 127/81       NPO Status:                                                                                 BMI:   Wt Readings from Last 3 Encounters:   12/09/20 200 lb (90.7 kg)     Body mass index is 25.68 kg/m².     CBC:   Lab Results   Component Value Date    WBC 10.0 12/14/2020    RBC 4.71 12/14/2020    HGB 14.9 12/14/2020    HCT 44.4 12/14/2020 MCV 94.3 12/14/2020    RDW 12.8 12/14/2020     12/14/2020       CMP:   Lab Results   Component Value Date     12/14/2020    K 3.9 12/14/2020     12/14/2020    CO2 19 12/14/2020    BUN 24 12/14/2020    CREATININE 0.78 12/14/2020    GFRAA >60 12/14/2020    LABGLOM >60 12/14/2020    GLUCOSE 137 12/14/2020    PROT 6.9 12/09/2020    CALCIUM 8.2 12/14/2020    BILITOT 0.64 12/09/2020    ALKPHOS 48 12/09/2020    AST 23 12/09/2020    ALT 28 12/09/2020       POC Tests:   Recent Labs     12/13/20  1600   POCGLU 163*       Coags:   Lab Results   Component Value Date    PROTIME 10.7 12/09/2020    INR 1.0 12/09/2020    APTT 25.4 12/09/2020       HCG (If Applicable): No results found for: PREGTESTUR, PREGSERUM, HCG, HCGQUANT     ABGs: No results found for: PHART, PO2ART, SAN7WKJ, QFJ6BOO, BEART, X9ZQGPMY     Type & Screen (If Applicable):  No results found for: LABABO, LABRH    Drug/Infectious Status (If Applicable):  No results found for: HIV, HEPCAB    COVID-19 Screening (If Applicable):   Lab Results   Component Value Date    COVID19 Not Detected 12/09/2020         Anesthesia Evaluation    Airway: Mallampati: II     Neck ROM: full   Dental:    (+) edentulous      Pulmonary:Negative Pulmonary ROS breath sounds clear to auscultation                             Cardiovascular:    (+) hypertension:, hyperlipidemia        Rhythm: regular  Rate: normal  Echocardiogram reviewed    Cleared by cardiology           ROS comment: Left ventricle is normal in size. Global left ventricular systolic function  is normal. Estimated ejection fraction is 55 % . Normal left ventricular wall thickness. No obvious wall motion abnormality seen. Neuro/Psych:   (+) neuromuscular disease:,             GI/Hepatic/Renal:   (+) GERD:,           Endo/Other: Negative Endo/Other ROS                    Abdominal:         (-) obese     Vascular: negative vascular ROS.                                        Anesthesia Plan MAC and general     ASA 3     (Long discussion had with Dr Migue Bran re awake vs general.  Will attempt sedation with Precedex and convert to GA if needed)  Induction: intravenous. arterial line    Anesthetic plan and risks discussed with patient, healthcare power of  and child/children. Plan discussed with CRNA.                   Darion Lion MD   12/14/2020

## 2020-12-14 NOTE — PROGRESS NOTES
Speech Language Pathology  [facility]  Speech Language Pathology    Date: 12/14/2020  Patient Name: Anastasia Dandy  YOB: 1945   AGE: 66 y.o. MRN: [id number]        Patient Not Available for Speech Therapy     Due to:  [] Testing  [] Hemodialysis  [] Cancelled by RN  [x] Surgery   [] Intubation/Sedation/Pain Medication  [] Medical instability  [] Other:    Next scheduled treatment: as medically appropriate    Completed by:  Jax Avila M.S. [id number] [facility]

## 2020-12-14 NOTE — PROGRESS NOTES
DR. Guanakito Cowart at bedside evaluating patient. Would like Oxygen saturation at 100%.    Pt put on Non-rebreabreather 15ml

## 2020-12-15 ENCOUNTER — APPOINTMENT (OUTPATIENT)
Dept: MRI IMAGING | Age: 75
DRG: 025 | End: 2020-12-15
Payer: MEDICARE

## 2020-12-15 LAB
ANION GAP SERPL CALCULATED.3IONS-SCNC: 11 MMOL/L (ref 9–17)
BUN BLDV-MCNC: 28 MG/DL (ref 8–23)
BUN/CREAT BLD: ABNORMAL (ref 9–20)
CALCIUM SERPL-MCNC: 7.7 MG/DL (ref 8.6–10.4)
CHLORIDE BLD-SCNC: 106 MMOL/L (ref 98–107)
CO2: 20 MMOL/L (ref 20–31)
CREAT SERPL-MCNC: 0.85 MG/DL (ref 0.7–1.2)
CULTURE: NO GROWTH
GFR AFRICAN AMERICAN: >60 ML/MIN
GFR NON-AFRICAN AMERICAN: >60 ML/MIN
GFR SERPL CREATININE-BSD FRML MDRD: ABNORMAL ML/MIN/{1.73_M2}
GFR SERPL CREATININE-BSD FRML MDRD: ABNORMAL ML/MIN/{1.73_M2}
GLUCOSE BLD-MCNC: 123 MG/DL (ref 75–110)
GLUCOSE BLD-MCNC: 132 MG/DL (ref 75–110)
GLUCOSE BLD-MCNC: 139 MG/DL (ref 70–99)
GLUCOSE BLD-MCNC: 161 MG/DL (ref 75–110)
HCT VFR BLD CALC: 42.5 % (ref 40.7–50.3)
HEMOGLOBIN: 13.9 G/DL (ref 13–17)
Lab: NORMAL
MCH RBC QN AUTO: 31 PG (ref 25.2–33.5)
MCHC RBC AUTO-ENTMCNC: 32.7 G/DL (ref 28.4–34.8)
MCV RBC AUTO: 94.7 FL (ref 82.6–102.9)
NRBC AUTOMATED: 0 PER 100 WBC
PDW BLD-RTO: 13.2 % (ref 11.8–14.4)
PHOSPHORUS: 3.4 MG/DL (ref 2.5–4.5)
PLATELET # BLD: 132 K/UL (ref 138–453)
PMV BLD AUTO: 10 FL (ref 8.1–13.5)
POTASSIUM SERPL-SCNC: 3.9 MMOL/L (ref 3.7–5.3)
RBC # BLD: 4.49 M/UL (ref 4.21–5.77)
SODIUM BLD-SCNC: 137 MMOL/L (ref 135–144)
SPECIMEN DESCRIPTION: NORMAL
WBC # BLD: 10.8 K/UL (ref 3.5–11.3)

## 2020-12-15 PROCEDURE — 2580000003 HC RX 258: Performed by: PHYSICIAN ASSISTANT

## 2020-12-15 PROCEDURE — 6370000000 HC RX 637 (ALT 250 FOR IP): Performed by: NURSE PRACTITIONER

## 2020-12-15 PROCEDURE — A9576 INJ PROHANCE MULTIPACK: HCPCS | Performed by: PHYSICIAN ASSISTANT

## 2020-12-15 PROCEDURE — 85027 COMPLETE CBC AUTOMATED: CPT

## 2020-12-15 PROCEDURE — 99232 SBSQ HOSP IP/OBS MODERATE 35: CPT | Performed by: PSYCHIATRY & NEUROLOGY

## 2020-12-15 PROCEDURE — APPNB45 APP NON BILLABLE 31-45 MINUTES: Performed by: NURSE PRACTITIONER

## 2020-12-15 PROCEDURE — 80048 BASIC METABOLIC PNL TOTAL CA: CPT

## 2020-12-15 PROCEDURE — 99232 SBSQ HOSP IP/OBS MODERATE 35: CPT | Performed by: PHYSICAL MEDICINE & REHABILITATION

## 2020-12-15 PROCEDURE — APPSS15 APP SPLIT SHARED TIME 0-15 MINUTES: Performed by: NURSE PRACTITIONER

## 2020-12-15 PROCEDURE — 6360000002 HC RX W HCPCS: Performed by: PHYSICIAN ASSISTANT

## 2020-12-15 PROCEDURE — 2060000000 HC ICU INTERMEDIATE R&B

## 2020-12-15 PROCEDURE — 82947 ASSAY GLUCOSE BLOOD QUANT: CPT

## 2020-12-15 PROCEDURE — 84100 ASSAY OF PHOSPHORUS: CPT

## 2020-12-15 PROCEDURE — 6370000000 HC RX 637 (ALT 250 FOR IP): Performed by: PHYSICIAN ASSISTANT

## 2020-12-15 PROCEDURE — 2700000000 HC OXYGEN THERAPY PER DAY

## 2020-12-15 PROCEDURE — 70553 MRI BRAIN STEM W/O & W/DYE: CPT

## 2020-12-15 PROCEDURE — 2500000003 HC RX 250 WO HCPCS: Performed by: PHYSICIAN ASSISTANT

## 2020-12-15 PROCEDURE — 2500000003 HC RX 250 WO HCPCS: Performed by: NURSE PRACTITIONER

## 2020-12-15 PROCEDURE — 92507 TX SP LANG VOICE COMM INDIV: CPT

## 2020-12-15 PROCEDURE — 6360000004 HC RX CONTRAST MEDICATION: Performed by: PHYSICIAN ASSISTANT

## 2020-12-15 RX ORDER — CARVEDILOL 3.12 MG/1
3.12 TABLET ORAL 2 TIMES DAILY WITH MEALS
Status: DISCONTINUED | OUTPATIENT
Start: 2020-12-15 | End: 2020-12-28 | Stop reason: HOSPADM

## 2020-12-15 RX ORDER — DEXAMETHASONE 4 MG/1
4 TABLET ORAL EVERY 12 HOURS SCHEDULED
Status: COMPLETED | OUTPATIENT
Start: 2020-12-16 | End: 2020-12-16

## 2020-12-15 RX ORDER — DEXAMETHASONE 2 MG/1
TABLET ORAL
Qty: 24 TABLET | Refills: 0 | Status: SHIPPED | OUTPATIENT
Start: 2020-12-15 | End: 2020-12-15 | Stop reason: HOSPADM

## 2020-12-15 RX ORDER — LABETALOL HYDROCHLORIDE 5 MG/ML
10 INJECTION, SOLUTION INTRAVENOUS
Status: DISCONTINUED | OUTPATIENT
Start: 2020-12-15 | End: 2020-12-28 | Stop reason: HOSPADM

## 2020-12-15 RX ORDER — SODIUM CHLORIDE 0.9 % (FLUSH) 0.9 %
10 SYRINGE (ML) INJECTION PRN
Status: DISCONTINUED | OUTPATIENT
Start: 2020-12-15 | End: 2020-12-28 | Stop reason: HOSPADM

## 2020-12-15 RX ADMIN — DEXAMETHASONE SODIUM PHOSPHATE 4 MG: 4 INJECTION, SOLUTION INTRAMUSCULAR; INTRAVENOUS at 08:41

## 2020-12-15 RX ADMIN — LEVETIRACETAM 500 MG: 5 INJECTION INTRAVENOUS at 14:25

## 2020-12-15 RX ADMIN — LEVETIRACETAM 500 MG: 5 INJECTION INTRAVENOUS at 01:55

## 2020-12-15 RX ADMIN — SODIUM CHLORIDE, PRESERVATIVE FREE 10 ML: 5 INJECTION INTRAVENOUS at 22:57

## 2020-12-15 RX ADMIN — FAMOTIDINE 20 MG: 10 INJECTION INTRAVENOUS at 22:56

## 2020-12-15 RX ADMIN — GADOTERIDOL 18 ML: 279.3 INJECTION, SOLUTION INTRAVENOUS at 16:15

## 2020-12-15 RX ADMIN — CARVEDILOL 3.12 MG: 3.12 TABLET, FILM COATED ORAL at 11:59

## 2020-12-15 RX ADMIN — FAMOTIDINE 20 MG: 10 INJECTION INTRAVENOUS at 08:41

## 2020-12-15 RX ADMIN — CARVEDILOL 3.12 MG: 3.12 TABLET, FILM COATED ORAL at 17:35

## 2020-12-15 RX ADMIN — DEXAMETHASONE SODIUM PHOSPHATE 4 MG: 4 INJECTION, SOLUTION INTRAMUSCULAR; INTRAVENOUS at 02:00

## 2020-12-15 RX ADMIN — Medication 10 MG: at 10:50

## 2020-12-15 RX ADMIN — SODIUM CHLORIDE, PRESERVATIVE FREE 10 ML: 5 INJECTION INTRAVENOUS at 08:41

## 2020-12-15 RX ADMIN — STANDARDIZED SENNA CONCENTRATE AND DOCUSATE SODIUM 1 TABLET: 8.6; 5 TABLET ORAL at 08:40

## 2020-12-15 RX ADMIN — DEXTROSE MONOHYDRATE 2 G: 50 INJECTION, SOLUTION INTRAVENOUS at 04:44

## 2020-12-15 RX ADMIN — DESMOPRESSIN ACETATE 40 MG: 0.2 TABLET ORAL at 22:56

## 2020-12-15 RX ADMIN — INSULIN LISPRO 1 UNITS: 100 INJECTION, SOLUTION INTRAVENOUS; SUBCUTANEOUS at 11:59

## 2020-12-15 RX ADMIN — DEXAMETHASONE SODIUM PHOSPHATE 4 MG: 4 INJECTION, SOLUTION INTRAMUSCULAR; INTRAVENOUS at 17:35

## 2020-12-15 NOTE — PROGRESS NOTES
Speech Language Pathology  Speech Language Pathology  [address] [facility]    Speech Language Treatment Note    Date: 12/15/2020  Patients Name: Dary Garcia  MRN: [id number]  Diagnosis:   Patient Active Problem List   Diagnosis Code    Brain mass G93.89    Acute encephalopathy G93.40    Cerebral edema ([facility] [address] G93.6       Pain: 0/10    Speech and Language Treatment  Treatment time: 0541-3552    Subjective: [x] Alert [x] Cooperative     [] Confused     [] Agitated    [] Lethargic      Objective/Assessment:  Auditory Comprehension: Single unit commands 3/6 increased to 6/6 with mod cues. 2 unit commands 0/3 increased to 1/3 with max cues. Simple yes/no 4/6 increased min cues to 6/6 with max cues    Verbal Expression: Object Naming 2/6 increased to 5/6 with max cues. Responsive Naming 1/5 increased to 4/5 with max cues and repetition. Able to verbalize first name independent, last name with repetition. Age with choice cues. Not hospital or year with max cues    Pt is very pleasant and cooperative. Plan:  [x] Continue  services    [] Discharge from :      Discharge recommendations: [] Inpatient Rehab   [] [place]   [] Outpatient Therapy  [] Follow up at trauma clinic   [x] Other: Further therapy recommended at discharge. Treatment completed by:  Kevin Amanda M.S. [id number] [facility], [place]

## 2020-12-15 NOTE — OP NOTE
Operative Note      Patient: Reese Borges  YOB: 1945  MRN: [id number]    Date of Procedure: 12/14/2020    Pre-Op Diagnosis: LEFT BRAIN TUMOR, SUSPECT MALIGNANT GLIOMGA    Post-Op Diagnosis: same       Procedure(s):  LEFT TEMPORAL CRANIOTOMY FOR RESECTION OF TUMOR  AWAKE CORTICAL SPEECH MAPPING, ELECTROCORTICOGRAPHY  REGIONAL NERVE BLOCK  INTRAOP ULTRASONOGRAPHY  5-ALA GUIDED RESECTION  USE OF IMAGE GUIDANCE  MODIFIER 22: This is a complicated surgery due to location tumor to eloquent brain tissue. This required image guidance, preoperative sonography, as well as fluorescent guidance due to the proximity of the tumor to speech area. This required longer than normal surgical time by 2 hours to ensure maximal surgical safety and maximal resection of the tumor. patient only received systemic anesthesia via Precedex due to his age and less tolerance for general anesthesia during asleep awake asleep surgery making his positioning, pinning, and regional scalp block more exacting. Surgeon(s):  Ayaka Haynes DO    Assistant:   First Assistant: Yodit Baker    Anesthesia: General    Estimated Blood Loss (mL): less than 872     Complications: None    Specimens:   ID Type Source Tests Collected by Time Destination   1 : URINE FROM INITIAL MOJICA INSERTION Urine Urine, indwelling catheter CULTURE, URINE [facility], DO 12/14/2020 1000    A : LEFT TEMPORAL MASS Tissue Brain SURGICAL PATHOLOGY Ayaka Haynes, DO 12/14/2020 1047    B : LEFT TEMPORAL MASS Tissue Brain SURGICAL PATHOLOGY [facility] [place], DO 12/14/2020 1052    C : LEFT TEMPORAL MASS Tissue Brain SURGICAL PATHOLOGY Suryma [place], DO 12/14/2020 1110    D : LEFT TEMPORAL MASS Tissue Brain SURGICAL PATHOLOGY Suryma [place], DO 12/14/2020 1140    E : LEFT TEMPORAL MASS Tissue Brain SURGICAL PATHOLOGY Suryma [place], DO 12/14/2020 1149        Implants:  Implant Name Type Inv.  Item Serial No.  Lot No. LRB No. Used Action GRAFT DURA [id number] CLLGN SUTURELESS HIGHLY CNFRM RESTR - [id number]  GRAFT DURA [id number] CLLGN SUTURELESS HIGHLY CNFRM RESTR [id number] [employer] [id number] Left 1 Implanted   COVER BUR H [id number] CRANIOMAXILLOFACIAL PLT LO PROF W/ TAB  COVER BUR H [id number] CRANIOMAXILLOFACIAL PLT LO PROF W/ TAB  CARL CRANIOMAXILLOFACIAL-WD  Left 3 Implanted   SCREW BNE L5MM DIA1.5MM UNIV SELF DRL CRSS PIN  SCREW BNE L5MM DIA1.5MM UNIV SELF DRL CRSS PIN  CARL CRANIOMAXILLOFACIAL-WD  Left 14 Implanted   PLATE BNE BAR [id number] 2 H CRANIOMAXILLOFACIAL TI RIG FOR UNIV  PLATE BNE BAR [id number] 2 H CRANIOMAXILLOFACIAL TI RIG FOR UNIV  CARL CRANIOMAXILLOFACIAL-WD  Left 1 Implanted         Drains:   [REMOVED] Urethral Catheter Straight-tip 16 fr (Removed)   Catheter Indications Perioperative use in selected surgeries including but not limited to urologic, pelvic or need for intraoperative monitoring of urinary output due to prolonged surgery, large volume infusion or need for diuretic therapy in surgery 12/14/20 1830   Site Assessment No urethral drainage 12/14/20 1830   Urine Color Yellow 12/14/20 1830   Output (mL) 250 mL 12/14/20 1900       Findings: malignant glioma. Stable neurologic exam through out the surgery.      Brief history and indication for surgery: This is a 64-year-old man,right hand dominant, who presents with ALOC and aphasia. Work-up was significant for left temporal mass most consistent with malignant glioma. Metastatic work-up was negative. Patient's exam was significant for signifcant mixed dysphasia and mild right hemiparesis that improved somewhat suggesting possible siezure. I recommended surgical resection for both diagnosis and most effective treatment of this likely malignant tumor. I recommend awake speech mapping to maximize the safety of the surgery. Alternative is a needle biopsy or no surgery. Risk includes pain, infection, CSF leak, seizure, wound problems, brain damage, brain bleed, stroke, coma, death, medical/anesthesia risks. The likelihood of severe permanent brain damage/stroke/coma or death is very low but patient may experience worsening preoperative symptoms in the immediate postoperative period before he starts to recover prior function. All questions were answered and I was asked to proceed with surgery.      Operative details: The patient was brought to the operating room by anesthesia team. Large-bore IVs were secured, arterial line was installed. Precedex was started by anesthesia team.  Patient was positioned supine with a bump in the ipsilateral shoulder, head turned to the contralateral side,  back slightly elevated, knees bent all points of contact the patient in bed well-padded. 1% lidocaine with epinephrine  was infiltrated in the intended Lim pin position in the scalp. A Lim hernando device was placed in the patient's skull and tightened to 60lb of pressure without causing any raise in blood pressure rate or pain. Lim was secured to the table. Patient was secured to the table by wrapping with tape. I then did a regional nerve block with bupivacaine with epinephrine at the patient's supraorbital supratrochlear, auriculotemporal, lesser occipital, greater occipital third occipital nerve on the left side. Supraorbital nerve was noted on the right side as well as well as additional blocks at the pin site. Patient's head was registered to the [employer] navigational system using surface landmarks. The position of the tumor was identified using image guidance and the trajectory was planned. Patch of hair was shaved based on the planned reverse question incision. This was thoroughly prepped and scrub with alcohol and choraprep and sterilely draped. Timeout was called and all members operative team agreed to the procedure. Preoperative antibiotics was given as well as additional dose of preoperative epileptic drug. The incision was infiltrated with 1% lidocaine with epinephrine. The scalp was incised down to the bone with a 10 blade, hemostasis obtained with bipolar cautery Jayda clips. Temporalis fascia was incised with Bovie. Pericranium was elevated along with the temporalis muscle in a large myocutaneous flap with the periosteal elevator. The scalp was retracted with a [place] hook. Image guidance was brought into the operative field and verify the position of the planned craniotomy. High-speed drill was used to create a 3 bur holes at the periphery of the implant craniotomy flap. The underlying dura was dissected off the bone using combination of upgoing curette and Deirdre and Penfield 3. Then craniotomy flap was completed using a high-speed drill without injury to the dura. Dura was then anesthetized with lidocaine with epinephrine. Dura was then opened with a 15 blade in a cruciate manner and retracted with stay suture. Patient's bracelet was turned off and allowed to speak spontaneously. Then using image guidance, intraoperative sonography, the superior temporal gyrus superior temporal sulcus and the middle temporal gyrus was identified along with the tumor deep to the middle temporal gyrus. Electrocorticography strip was placed onto the brain. A negative speech mapping method was used to determine a safe entry zone in the middle temporal gyrus. There was no speech arrest, disarticulation, anomia, or comprehension deficit compared to baseline dysphagia with bipolar stimulation up to 8 mA at the superior and middle temporal gyrus. There was no after discharges. I then made a 1 cm diameter corticotomy at the center of the middle temporal gyrus and deepened to reach the tumor. The necrotic center of the tumor was debulked with mostly suction.   The rest of the tumor appeared atypical appearance of glioblastoma that appears mostly slightly sticky and firm and white/tan and similar to the white matter of the brain. I sent frozen specimen for pathology for confirmation of high-grade glioma. I then brought in the operating microscope to continue work. Using blue wavelength light, malignant tumor load pink under 5-ALA fluorescence. Tumor was debulked with a combination of ultrasonic aspirator and piecemeal dissection  with bipolar and suction. This it was obtained with a combination of bipolar, and tamponade. Toward the margin of the tumor brain interface, 5-ALA fluorescence help confirmed total resection of fluorescent tumor tissue. Periodically image guidance was used to confirm depth and margin of the resection cavity. The posterior medial aspect of the resection the lateral inferior aspect of the trigone was approached. The tumor forming the lateral inferior wall of the trigone was resected exposing the ependyma and the cord plexus. Minimal CSF was lost.  Fibrin sealant was placed on the small defect in the ependyma to prevent CSF leak. At the superior margin of the tumor resection patient was allowed to wake up more and answer questions asked during the initial part of the operation. He was slightly more drowsy than previously made more errors but had and general understanding of questions and answered fluently. Resection was completed when all fluorescent tumor tissue was resected,confirmed with image guidance, and confirmed with ultrasonography. The wound was thoroughly irrigated. Bleeding points with precisely controlled with bipolar at low setting, Surgi-George and tamponade. The surgical cavity was lined by herself. Dura was closed primarily with a DuraGen matrix placed on top of it. Bone was plated to the skull with cranial fixation plate. Scalp was closed in usual fashion with running Monocryl on the skin and dressed with Dermabond. Patient was taken off the pins awake and alert and recovered in PACU in stable condition. All counts are correct at the end of the procedure. Electronically signed by Glenn Mathew DO on 12/15/2020 at 6:55 PM

## 2020-12-15 NOTE — PROGRESS NOTES
Neurosurgery SUMAYA/Resident    Daily Progress Note   CC:  Chief Complaint   Patient presents with   Floydene Ada Cerebrovascular Accident     12/15/2020  8:54 AM    Chart reviewed. No acute events overnight. No new complaints. POD#1 s/p left temporal craniotomy for tumor resection. Denies headache, nausea, vomiting this morning. Was started on Cleviprex yesterday for SBP control and has since been turned off.  Afebrile   CTH reviewed showing postop changes with pneumocephalus    Vitals:    12/15/20 0815 12/15/20 0821 12/15/20 0830 12/15/20 0833   BP: (!) 141/66  (!) 147/65    Pulse: 66  87    Resp: 17  19 14   Temp:  97.8 °F (36.6 °C)     TempSrc:  Oral     SpO2: 100%  98% 100%   Weight:       Height:           PE:   E4 V4 M6  AOx2 slightly confused to some question but is able to answer correctly after multiple attempts   PERRL, EOMI 4mm reactive bilaterally   Cranial Nerves:    II: Visual acuity:  normal  III: Pupils:  equal, round, reactive to light  III,IV,VI: Extra Ocular Movements:intact  V: Facial sensation:  intact  VII: Facial strength: intact, no droop noted   VIII: Hearing:  intact  IX: Palate:  intact  XI: Shoulder shrug: intact  XII: Tongue movement: intact  Mixed aphasia   edentulous     Motor MAEE  No drift noted  Good finger to nose testing   Sensation: intact   Incision: left temporal craniotomy site intact no drainage noted   Slight redness to face noted       Lab Results   Component Value Date    WBC 10.8 12/15/2020    HGB 13.9 12/15/2020    HCT 42.5 12/15/2020     (L) 12/15/2020    HDL 37 (L) 12/09/2020    ALT 28 12/09/2020    AST 23 12/09/2020     12/15/2020    K 3.9 12/15/2020     12/15/2020    CREATININE 0.85 12/15/2020    BUN 28 (H) 12/15/2020    CO2 20 12/15/2020    TSH 2.76 12/09/2020    INR 1.0 12/09/2020    LABA1C 5.5 12/10/2020       Radiology Ct Head Wo Contrast    Result Date: 12/14/2020 EXAMINATION: CT OF THE HEAD WITHOUT CONTRAST  12/14/2020 2:30 pm TECHNIQUE: CT of the head was performed without the administration of intravenous contrast. Dose modulation, iterative reconstruction, and/or weight based adjustment of the mA/kV was utilized to reduce the radiation dose to as low as reasonably achievable. COMPARISON: None. HISTORY: ORDERING SYSTEM PROVIDED HISTORY: s/p tumor resection TECHNOLOGIST PROVIDED HISTORY: Please complete before leaving pacu and transfer patient with blanket over face. Patient must be kept in dark room 48 hrs post op. Please complete CTH with blanket over face. s/p tumor resection Reason for Exam: post op FINDINGS: BRAIN/VENTRICLES: Extensive bifrontal pneumocephaly  and also  along the left temporal parietal region. Postsurgical changes in the left temporoparietal lobe with mild residual extra-axial hemorrhage and intra-axial edema. Mild midline shift from left to right. Left temporoparietal craniotomy defects. Chronic right ethmoid and sphenoid sinus disease. [ Status post left craniotomy with postsurgical changes in the left temporoparietal lobe with residual edema, pneumocephaly  and mild extra-axial hemorrhage. A/P  66 y.o. male who presents with left brain mass suspected malignant glioblastoma   POD#1 s/p left temporal craniotomy for resection of tumor with awake speech mapping image and % ALA guided resection    - ok to transfer out of ICU from neurosurgery standpoint   - continue nonrebreather at this time  - continue to keep patient in the dark until tomorrow evening  - ok for patient to work with PT and OT (will need covering over face to protect from light)  - MRI brain pending   - continue decadron and keppra   - supportive care per primary   - SCDs for DVT ppx at this time     Please contact neurosurgery with any changes in patients neurologic status.        Phyllis Glover CNP  12/15/20  8:54 AM

## 2020-12-15 NOTE — PROGRESS NOTES
Daily Progress Note  Neuro Critical Care    Patient Name: Richmond Aguilar  Patient : 1945  Room/Bed: 4543/4668-08  Code Status: Full  Allergies: No Known Allergies    CHIEF COMPLAINT:      AMS     INTERVAL HISTORY    Initial Presentation (Admitted 20): The patient is a 73 y. o. male who presented to the emergency department for altered mental status.  Supposedly making Facebook posts last night that did not make sense. Moraima Hernandez was concerned and called her sister who lives closer to patient. Jerrod Lawler spoke to patient on the phone this afternoon and noted garbled speech and the patient suddenly stopped talking.  EMS was called.  Found patient altered with decreased responsiveness.  Also concern for left-sided facial droop.  Transported via [facility] with concern for stroke.  Vital stable in route with exception of mild hypertension.  Glucose within normal limits.  Stroke alert called upon arrival. Alexey Hayes last known well.  GCS between 8 and 10 upon arrival; protecting airway.  Initial NIH per stroke note of 20 for the following deficits: level of consciousness, facial palsy, left arm drift, weakness in lower extremities bilaterally, sensory loss, aphasia.  Was taken immediately for CT head which demonstrated left posterior temporal lobe mass of approximately 3 cm with surrounding vasogenic edema and minimal midline shift.  Neurosurgery team consulted. [facility] for [facility] with and without contrast.  Hold steroids at this time.  Concern for possible seizure-like activity was loaded with 1 g of Keppra.  Labs significant for mild hyponatremia 133 as well as mild elevation in myoglobin at 111.  Also had mild troponin elevation but repeat troponin trending downward.  Family contacted by ER resident. Kathy Serrano known medical problems include hypertension and possible dementia per ED note.  No additional medical information on our records or in [facility] Course: 12/10: No acute events overnight. More alert this morning. A/O to person and can state that he is in hospital. Speaking in short sentences with mild dysarthria and broken speech. Persistent facial droop. Following commands. No pronator drift. Mild coughing with bedside swallow; speech to perform formal swallow study later today. 12/12: Cardiology consulted for surgical clearance. 12/13: Cardiology cleared for surgery, low risk. 12/14: Precedex trial completed in ICU last night, planning for awake craniotomy with resection and biopsy today with neurosurgery. 5-ALA being used, patient will need to return to a private room and remain in dark x 48 hours to reduce risk for dermal burns. CT head post-op showed postsurgical changed with residual edema, pneumocephalus, and mild extra-axial hemorrhage. Last 24h:   Overnight, patient was mildly hypertensive, did not respond well to Labetalol and so a Cleviprex infusion was started which was able to be weaned off this morning. Patient will need to remain in a dark room x 48 hours post-op. Follow up MRI brain today. Exam is stable.      CURRENT MEDICATIONS:  SCHEDULED MEDICATIONS:   sodium chloride flush  10 mL Intravenous 2 times per day    sennosides-docusate sodium  1 tablet Oral BID    dexamethasone  4 mg Intravenous Q8H    insulin lispro  0-6 Units Subcutaneous TID WC    [Held by provider] enoxaparin  30 mg Subcutaneous BID    [Held by provider] potassium bicarb-citric acid  40 mEq Oral Daily    levetiracetam  500 mg Intravenous Q12H    famotidine (PEPCID) injection  20 mg Intravenous BID    atorvastatin  40 mg Oral Nightly     CONTINUOUS INFUSIONS:   clevidipine 2 mg/hr (12/15/20 0620)    dextrose      sodium chloride 75 mL/hr at 12/14/20 1841     PRN MEDICATIONS: AST 23 12/09/2020     12/15/2020    K 3.9 12/15/2020     12/15/2020    CREATININE 0.85 12/15/2020    BUN 28 (H) 12/15/2020    CO2 20 12/15/2020    TSH 2.76 12/09/2020    INR 1.0 12/09/2020    LABA1C 5.5 12/10/2020     24 HOUR INTAKE/OUTPUT:    Intake/Output Summary (Last 24 hours) at 12/15/2020 0745  Last data filed at 12/15/2020 0500  Gross per 24 hour   Intake 2262 ml   Output 1500 ml   Net 762 ml       IMAGING:   CT HEAD WO CONTRAST   Final Result   [   Status post left craniotomy with postsurgical changes in the left   temporoparietal lobe with residual edema, pneumocephaly  and mild extra-axial   hemorrhage. MRI BRAIN W WO CONTRAST   Final Result   Ring-enhancing left temporal lobe mass consistent with abscess versus primary   or secondary neoplasm. Local edema and slight midline shift. Mixed   cytotoxic and vasogenic edema. Microscopic hemorrhage. CT CHEST ABDOMEN PELVIS WO CONTRAST   Final Result   No acute disease. Incidental findings as above. CT LUMBAR SPINE TRAUMA RECONSTRUCTION   Final Result   CT thoracic spine: Degenerative and degenerative disc disease. No acute   fracture or traumatic malalignment. CT lumbar spine: No acute fracture. Patient has an anterolisthesis L5 upon   S1 and bilateral chronic spondylosis. Multilevel degenerative and   degenerative disc changes are noted as above. Multilevel disc bulges. Exiting L5-S1 nerve roots are elevated and borderline impinged. CT THORACIC SPINE TRAUMA RECONSTRUCTION   Final Result   CT thoracic spine: Degenerative and degenerative disc disease. No acute   fracture or traumatic malalignment. CT lumbar spine: No acute fracture. Patient has an anterolisthesis L5 upon   S1 and bilateral chronic spondylosis. Multilevel degenerative and   degenerative disc changes are noted as above. Multilevel disc bulges. Exiting L5-S1 nerve roots are elevated and borderline impinged. CT CERVICAL SPINE WO CONTRAST   Final Result   No acute abnormality of the cervical spine. XR CHEST PORTABLE   Final Result   No acute cardiopulmonary abnormality. CT BRAIN PERFUSION   Final Result   1. No perfusion mismatch. 2. Left temporal lobe mass demonstrates matching increased cerebral blood   flow cerebral blood volume and mean transit time. CTA HEAD NECK W CONTRAST   Final Result   1. Noncontrast CT of the head demonstrates presence of a 3 cm left posterior   temporal lobe mass with surrounding vasogenic edema. Minimal midline shift   from left to right is noted. 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 MRI of the brain with contrast is recommended. 2. Unremarkable CTA of the head and neck. The findings were sent to the Radiology Results [address] at 2:15   pm on 12/9/2020to be communicated to a licensed caregiver. CT HEAD WO CONTRAST   Final Result   1. Noncontrast CT of the head demonstrates presence of a 3 cm left posterior   temporal lobe mass with surrounding vasogenic edema. Minimal midline shift   from left to right is noted. 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 MRI of the brain with contrast is recommended. 2. Unremarkable CTA of the head and neck. The findings were sent to the Radiology Results [address] at 2:15   pm on 12/9/2020to be communicated to a licensed caregiver. MRI BRAIN W WO CONTRAST    (Results Pending)         Labs and Images reviewed with:  [] Dr. Kayla Barrow. Roberta    [x] Dr. Pepe Coughlin  [] Dr. Aleman Fail  [] There are no new interval images to review. PHYSICAL EXAM       CONSTITUTIONAL:  Well developed, well nourished, alert and oriented x 2, in no acute distress. GCS 15. Nontoxic. Dysarthric speech. Expressive aphasia. HEAD:  Normocephalic, staples intact, no drainage noted.     EYES:  PERRLA, EOMI.   ENT:  moist mucous membranes   NECK:  supple, symmetric LUNGS:  Equal air entry bilaterally   CARDIOVASCULAR:  normal s1 / s2, RRR, distal pulses intact   ABDOMEN:  Soft, no rigidity   NEUROLOGIC:  Mental Status:  Not oriented to date or place,awake             Cranial Nerves:    III: Pupils:  equal, round, reactive to light  III,IV,VI: Extra Ocular Movements: intact  VII: Facial strength: abnormal mild left facial droop    Motor Exam:    Drift:  absent  Tone:  normal    5 out of 5 motor strength in all extremities with exception of 4+ out of 5 in bilateral lower extremities. DRAINS:  [x] There are no drains for Neuro Critical Care to monitor at this time. ASSESSMENT AND PLAN:       This is a 73 y. o. male with altered mental status. Initially stroke alert due to dysarthria and concern for unilateral deficits. Found to have a 3cm left temporal lobe brain mass, vasogenic edema, minimal midline shift. MRI showed ring enhancing lesion concerning for mass vs abscess.      NEUROLOGIC:  - CT head: left posterior temporal lobe bass approximately 3cm in diameter with surrounding vasogenic edema and minimal midline shift.  - AEDs: loaded with 1g keppra in ED; will continue keppra 500mg BID  - LTME negative for seizures  - Altered mental status of unclear etiology              - unclear last known well              - Brain mass on CT; concern for infectious etiology vs neoplasm              - MRI: Ring-enhancing left temporal lobe mass consistent with abscess versus neoplasm. Mixed cytotoxic and vasogenic edema with minimal midline shift. Microscopic hemorrhage.               -NS recommending mass biopsy on Monday 12/14              - Decadron 4mg Q6  - POD # 1 Left temporal craniotomy for resection and 5-ALA guided resection  - Goal SBP <140  - Neuro checks per protocol     CARDIOVASCULAR:  - Goal SBP <140  - Cleviprex weaned off this morning  - Hx of HTN per family but unclear of home meds  - continue lipitor  - Lipid: mildly low HDL, otherwise unremarkable - Trop 34 > 31 > 28  - Echocardiogram: EF 55%, normal function  - Cardiology consult, cleared for surgery with low risk  - Continue telemetry     PULMONARY:  - No respiratory distress; protectin airway  - CXR unremarkable  - CT chest/abd/pelvis without to evaluate for primary CA  - Keep sats >90%     RENAL/FLUID/ELECTROLYTE:  - BUN 28/ Creatinine 0.85  - normal kidney function  - Monitor I/Os  - IVF: Normal Saline @ 75 mL/hr  - Replace electrolytes PRN  - Daily BMP     GI/NUTRITION:  NUTRITION:  DIET DENTAL SOFT  - Bowel regimen: Glycolax PRN  - GI prophylaxis: Pepcid BID  - ammonia and LFTs within normal limits  - CT Abd/pelvis without contrast no acute findings     ID:  - Afebrile, Tmax 36.8  - CXR normal  - Mild leukocytosis, WBC 10.8  - UA/Urine culture negatie  - covid negative (12/9/20)  - Continue to monitor for fevers  - Daily CBC     HEME:   - H&H 13.9/42.5  - Mild thrombocytopenia, Platelets 850  - Daily CBC     ENDOCRINE:  - Continue to monitor blood glucose, goal <180  - A1C pending 5.5  - LDSSI added while on Decadron     OTHER:  - PT/OT/ST   - Code Status: Full code  - PM&R Consult     PROPHYLAXIS:  Stress ulcer: H2 blocker     DVT PROPHYLAXIS:  - SCD sleeves - Thigh High   - Resume Lovenox when OK with neurosurgery    DISPOSITION:  Remain ICU for close monitoring post-op    We will continue to follow along. For any changes in exam or patient status please contact Neuro Critical Care.       MING Duron - CNP  Neuro Critical Care  Pager [phone]  12/15/2020     7:45 AM

## 2020-12-15 NOTE — ANESTHESIA POSTPROCEDURE EVALUATION
Department of Anesthesiology  Postprocedure Note    Patient: Pastor Batres  MRN: [id number]  [place]: 1945  Date of evaluation: 12/14/2020  Time:  11:55 PM     Procedure Summary     Date: 12/14/20 Room / Location: [address]    Anesthesia Start: 7144 Anesthesia Stop: 8396    Procedure: TEMPORAL CRANIOTOMY FOR TUMOR, IMAGE GUIDED, AWAKE (Left Head) Diagnosis: (LEFT BRAIN TUMOR)    Surgeons: Glenn Mathew DO Responsible Provider: Maximo Manjarrez MD    Anesthesia Type: MAC, general ASA Status: 3          Anesthesia Type: MAC, general    Bella Phase I: Bella Score: 9    Bella Phase II:      Last vitals: Reviewed and per EMR flowsheets.      POST-OP ANESTHESIA NOTE       /63   Pulse 70   Temp 97.8 °F (36.6 °C)   Resp 16   Ht 6' 2\" (1.88 m)   Wt 200 lb (90.7 kg)   SpO2 98%   BMI 25.68 kg/m²    Pain Assessment: FLACC  Pain Level: 4           Anesthesia Post Evaluation    Patient location during evaluation: PACU  Patient participation: complete - patient participated  Level of consciousness: awake  Pain score: 4  Airway patency: patent  Nausea & Vomiting: no vomiting and no nausea  Complications: no  Cardiovascular status: hemodynamically stable  Respiratory status: acceptable  Hydration status: stable

## 2020-12-15 NOTE — CONSULTS
Physical Medicine & Rehabilitation  Progress Note        Admitting Physician: Tg Reid MD    Primary Care Provider: No primary care provider on file. Chief Complaint: AMS, Dysarthria, Weakness    Brief History: This is a follow up to the initial consult from 12/11/2020 on Mr. Berto Suarez is a 66 y.o. right handed male who was admitted to [facility] on 12/9/2020 with Cerebrovascular Accident    He was originally admitted for AMS and impaired speech with L facial droop. He was treated for stroke with NIHSS 20. CT showed L posterior temporal lobe mass with surrounding vasogenic edema and minimal midline shift. He was medically managed in neuro ICU. LTME has been negative for seizures. Dr. Wei Perez performed L temporal craniotomy for tumor resection on 12/14/2020. Neurosurgery recommending nonrebreather and keeping patient in dark room until the evening of 12/16. MRI brain has been ordered. He is on decadron and keppra. Subjective: The patient is resting comfortably. The patient's mobility is diminished.  He reports mild aching pain and generalized weakness after surgery      ROS:  Constitutional: negative for anorexia, chills, fatigue, fevers, sweats and weight loss  Eyes: negative for redness and visual disturbance  Ears, nose, mouth, throat, and face: negative for earaches, epistaxis, sore throat and tinnitus  Respiratory: negative for cough and shortness of breath  Cardiovascular: negative for chest pain, dyspnea and palpitations  Gastrointestinal: negative for abdominal pain, change in bowel habits, constipation, nausea and vomiting  Genitourinary:negative for dysuria, frequency, hesitancy and urinary incontinence  Integument/breast: negative for pruritus and rash  Musculoskeletal:negative for stiff joints  Neurological: negative for dizziness, headaches   Behavioral/Psych: negative for decreased appetite, depression and fatigue    Rehabilitation:   No therapy post-operatively yet UE Dressing: Minimal assistance, Increased time to complete  LE Dressing: Moderate assistance, Setup, Verbal cueing, Increased time to complete(to doff/don socks seated at EOB pt req Max A with LLE and Min A with RLE)  Toileting: Increased time to complete, Moderate assistance  Additional Comments: Pt pleasant and cooperative throughout session.  Education and demonstration provided on 4 figure dressing technique pt demo F hip flexion         Balance  Sitting Balance: Stand by assistance(Pt tolerated approx 6-8 min seated at EOB static/dynamic)  Standing Balance: Contact guard assistance   Standing Balance  Time: Pt tolerated approx 1-2 min  Activity: static standing  Comment: utilizing RW  Functional Mobility  Functional - Mobility Device: Rolling Walker  Activity: To/from bathroom  Assist Level: Minimal assistance  Functional Mobility Comments: pt req assist with walker management     Bed mobility  Supine to Sit: Stand by assistance  Sit to Supine: Stand by assistance  Scooting: Stand by assistance  Comment: HOB upright; assist with lines  Transfers  Stand Step Transfers: Minimal assistance  Sit to stand: Minimal assistance  Stand to sit: Minimal assistance  Transfer Comments: utilizing RW   Toilet Transfers  Toilet - Technique: Ambulating  Equipment Used: Standard toilet  Toilet Transfer: Minimal assistance  Toilet Transfers Comments: utilizing grab bar for sit><stand from low toilet BSC placed over toilet to increase height in attempt to increase pt independence             SLP:  12/10/2020 sennosides-docusate sodium (SENOKOT-S) 8.6-50 MG tablet 1 tablet, 1 tablet, Oral, BID  magnesium hydroxide (MILK OF MAGNESIA) 400 MG/5ML suspension 30 mL, 30 mL, Oral, Daily PRN  bisacodyl (DULCOLAX) suppository 10 mg, 10 mg, Rectal, Daily PRN  promethazine (PHENERGAN) tablet 12.5 mg, 12.5 mg, Oral, Q6H PRN **OR** ondansetron (ZOFRAN) injection 4 mg, 4 mg, Intravenous, Q6H PRN  clevidipine (CLEVIPREX) infusion, 2 mg/hr, Intravenous, Continuous  dexamethasone (DECADRON) injection 4 mg, 4 mg, Intravenous, Q8H  insulin lispro (HUMALOG) injection vial 0-6 Units, 0-6 Units, Subcutaneous, TID WC  glucose (GLUTOSE) 40 % oral gel 15 g, 15 g, Oral, PRN  dextrose 50 % IV solution, 12.5 g, Intravenous, PRN  glucagon (rDNA) injection 1 mg, 1 mg, Intramuscular, PRN  dextrose 5 % solution, 100 mL/hr, Intravenous, PRN  [Held by provider] enoxaparin (LOVENOX) injection 30 mg, 30 mg, Subcutaneous, BID  [Held by provider] potassium bicarb-citric acid (EFFER-K) effervescent tablet 40 mEq, 40 mEq, Oral, Daily  0.9 % sodium chloride infusion, , Intravenous, Continuous  levetiracetam (KEPPRA) 500 mg/100 mL IVPB, 500 mg, Intravenous, Q12H  famotidine (PEPCID) injection 20 mg, 20 mg, Intravenous, BID  atorvastatin (LIPITOR) tablet 40 mg, 40 mg, Oral, Nightly  sodium chloride flush 0.9 % injection 10 mL, 10 mL, Intravenous, PRN      Diagnostics:     MRI BRAIN 12/9/2020:  Ring-enhancing left temporal lobe mass consistent with abscess versus primary   or secondary neoplasm.  Local edema and slight midline shift.  Mixed   cytotoxic and vasogenic edema.  Microscopic hemorrhage.      CT BRAIN 12/14/2020:  Status post left craniotomy with postsurgical changes in the left   temporoparietal lobe with residual edema, pneumocephaly  and mild extra-axial   hemorrhage         CBC:   Recent Labs     12/13/20  0424 12/14/20  0412 12/15/20  0454   WBC 13.5* 10.0 10.8   RBC 4.76 4.71 4.49   HGB 15.0 14.9 13.9   HCT 45.2 44.4 42.5   MCV 95.0 94.3 94.7 RDW 12.8 12.8 13.2    162 132*     BMP:   Recent Labs     12/13/20  0424 12/14/20  0412 12/15/20  0454    132* 137   K 4.0 3.9 3.9   * 105 106   CO2 21 19* 20   PHOS 2.7 2.9 3.4   BUN 26* 24* 28*   CREATININE 0.85 0.78 0.85     BNP: No results for input(s): BNP in the last 72 hours. PT/INR: No results for input(s): PROTIME, INR in the last 72 hours. APTT: No results for input(s): APTT in the last 72 hours. CARDIAC ENZYMES: No results for input(s): CKMB, CKMBINDEX, TROPONINT in the last 72 hours. Invalid input(s): CKTOTAL;3  FASTING LIPID PANEL:  Lab Results   Component Value Date    HDL 37 (L) 12/09/2020     LIVER PROFILE: No results for input(s): AST, ALT, ALB, BILIDIR, BILITOT, ALKPHOS in the last 72 hours. Impression/Plan:    1. 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 On decadron, in dark room until 12/16. 2. Seizure prophylaxis: on Keppra   3. HTN/Hyperlipidemia: Currently on IV labetalol and cleviprex    Recommendation:  1. Will follow for post op therapy evals but anticipate he will benefit from acute rehab once medically cleared. 2. DVT Prophylaxis: Lovenox - on hold post operatively until resumed by neurosurgery        Electronically signed by Maria Esther Donald MD on 12/15/2020 at 10:27 AM       This note is created with the assistance of a speech recognition program.  While intending to generate a document that actually reflects the content of the visit, the document can still have some errors including those of syntax and sound a like substitutions which may escape proof reading. In such instances, actual meaning can be extrapolated by contextual diversion.

## 2020-12-15 NOTE — PROGRESS NOTES
[facility]   Progress Note                   Date:   12/15/2020  Patient name: Lillie Malik  Date of admission:  2020  1:32 PM  MRN:   [id number]  YOB: 1945  PCP: No primary care provider on file. Reason for Admission: Brain mass [G93.89]    Subjective:       Clinical Changes / Abnormalities: Pt seen and examined. He denies any CP. On non rebreather per neurosurg. BP improving. Medications:   Scheduled Meds:   sodium chloride flush  10 mL Intravenous 2 times per day    sennosides-docusate sodium  1 tablet Oral BID    dexamethasone  4 mg Intravenous Q8H    insulin lispro  0-6 Units Subcutaneous TID WC    [Held by provider] enoxaparin  30 mg Subcutaneous BID    [Held by provider] potassium bicarb-citric acid  40 mEq Oral Daily    levetiracetam  500 mg Intravenous Q12H    famotidine (PEPCID) injection  20 mg Intravenous BID    atorvastatin  40 mg Oral Nightly     Continuous Infusions:   clevidipine 2 mg/hr (12/15/20 0953)    dextrose      sodium chloride 75 mL/hr at 20 1841     CBC:   Recent Labs     20  0424 20  0412 12/15/20  0454   WBC 13.5* 10.0 10.8   HGB 15.0 14.9 13.9    162 132*     BMP:    Recent Labs     20  0424 20  0412 12/15/20  0454    132* 137   K 4.0 3.9 3.9   * 105 106   CO2 21 19* 20   BUN 26* 24* 28*   CREATININE 0.85 0.78 0.85   GLUCOSE 135* 137* 139*     Hepatic: No results for input(s): AST, ALT, ALB, BILITOT, ALKPHOS in the last 72 hours. Troponin: No results for input(s): TROPHS in the last 72 hours. BNP: No results for input(s): BNP in the last 72 hours. Lipids: No results for input(s): CHOL, HDL in the last 72 hours. Invalid input(s): LDLCALCU  INR: No results for input(s): INR in the last 72 hours. Last EK2020-normal sinus rhythm with first-degree AV block     Last Echo: 2020  Left ventricle is normal in size.  Global left ventricular systolic function is normal. Estimated ejection fraction is 55 % . Normal left ventricular wall thickness. No obvious wall motion abnormality seen.     Last Stress test/ LHC: No previous ischemia work-up  Objective:   Vitals: BP (!) 124/56   Pulse 69   Temp 97.8 °F (36.6 °C) (Oral)   Resp 16   Ht 6' 2\" (1.88 m)   Wt 200 lb (90.7 kg)   SpO2 98%   BMI 25.68 kg/m²   General appearance: alert and cooperative with exam, expressive aphasia   HEENT: Head: Normocephalic, no lesions, without obvious abnormality. Neck: no JVD, trachea midline, no adenopathy  Lungs: Clear to auscultation on non rebreather   Heart: Regular rate and rhythm, s1/s2 auscultated, no murmurs  Abdomen: soft, non-tender, bowel sounds active  Extremities: no edema  Neurologic: not done        Assessment / Acute Cardiac Problems:   1. Acute encephalopathy   2. HTN   3. HLP     Patient Active Problem List:     Brain mass     Acute encephalopathy     Cerebral edema ([facility] [address]      Plan of Treatment:   1. HTN post op. Goal SBP <160. Stable. Will start low dose coreg. Cleviprex off   2. Please call as needed.      Electronically signed by MING Costa CNP on 12/15/2020 at 10:32 AM  [address]  [phone]

## 2020-12-16 LAB
ANION GAP SERPL CALCULATED.3IONS-SCNC: 13 MMOL/L (ref 9–17)
BUN BLDV-MCNC: 27 MG/DL (ref 8–23)
BUN/CREAT BLD: ABNORMAL (ref 9–20)
CALCIUM IONIZED: 1.15 MMOL/L (ref 1.13–1.33)
CALCIUM SERPL-MCNC: 7.8 MG/DL (ref 8.6–10.4)
CHLORIDE BLD-SCNC: 104 MMOL/L (ref 98–107)
CO2: 20 MMOL/L (ref 20–31)
CREAT SERPL-MCNC: 0.79 MG/DL (ref 0.7–1.2)
GFR AFRICAN AMERICAN: >60 ML/MIN
GFR NON-AFRICAN AMERICAN: >60 ML/MIN
GFR SERPL CREATININE-BSD FRML MDRD: ABNORMAL ML/MIN/{1.73_M2}
GFR SERPL CREATININE-BSD FRML MDRD: ABNORMAL ML/MIN/{1.73_M2}
GLUCOSE BLD-MCNC: 112 MG/DL (ref 75–110)
GLUCOSE BLD-MCNC: 119 MG/DL (ref 75–110)
GLUCOSE BLD-MCNC: 125 MG/DL (ref 75–110)
GLUCOSE BLD-MCNC: 130 MG/DL (ref 70–99)
GLUCOSE BLD-MCNC: 151 MG/DL (ref 75–110)
GLUCOSE BLD-MCNC: 172 MG/DL (ref 75–110)
HCT VFR BLD CALC: 43.4 % (ref 40.7–50.3)
HEMOGLOBIN: 14.2 G/DL (ref 13–17)
MCH RBC QN AUTO: 30.9 PG (ref 25.2–33.5)
MCHC RBC AUTO-ENTMCNC: 32.7 G/DL (ref 28.4–34.8)
MCV RBC AUTO: 94.6 FL (ref 82.6–102.9)
NRBC AUTOMATED: 0 PER 100 WBC
PDW BLD-RTO: 13.1 % (ref 11.8–14.4)
PHOSPHORUS: 3 MG/DL (ref 2.5–4.5)
PLATELET # BLD: NORMAL K/UL (ref 138–453)
PLATELET, FLUORESCENCE: 125 K/UL (ref 138–453)
PLATELET, IMMATURE FRACTION: 2.7 % (ref 1.1–10.3)
PMV BLD AUTO: NORMAL FL (ref 8.1–13.5)
POTASSIUM SERPL-SCNC: 4 MMOL/L (ref 3.7–5.3)
RBC # BLD: 4.59 M/UL (ref 4.21–5.77)
SODIUM BLD-SCNC: 137 MMOL/L (ref 135–144)
SURGICAL PATHOLOGY REPORT: NORMAL
WBC # BLD: 8.8 K/UL (ref 3.5–11.3)

## 2020-12-16 PROCEDURE — 6370000000 HC RX 637 (ALT 250 FOR IP): Performed by: PHYSICIAN ASSISTANT

## 2020-12-16 PROCEDURE — 6370000000 HC RX 637 (ALT 250 FOR IP): Performed by: NURSE PRACTITIONER

## 2020-12-16 PROCEDURE — 84100 ASSAY OF PHOSPHORUS: CPT

## 2020-12-16 PROCEDURE — 99222 1ST HOSP IP/OBS MODERATE 55: CPT | Performed by: PSYCHIATRY & NEUROLOGY

## 2020-12-16 PROCEDURE — 6360000002 HC RX W HCPCS: Performed by: STUDENT IN AN ORGANIZED HEALTH CARE EDUCATION/TRAINING PROGRAM

## 2020-12-16 PROCEDURE — 99232 SBSQ HOSP IP/OBS MODERATE 35: CPT | Performed by: PHYSICAL MEDICINE & REHABILITATION

## 2020-12-16 PROCEDURE — 6360000002 HC RX W HCPCS: Performed by: NURSE PRACTITIONER

## 2020-12-16 PROCEDURE — 2500000003 HC RX 250 WO HCPCS: Performed by: PHYSICIAN ASSISTANT

## 2020-12-16 PROCEDURE — APPNB45 APP NON BILLABLE 31-45 MINUTES: Performed by: NURSE PRACTITIONER

## 2020-12-16 PROCEDURE — 82330 ASSAY OF CALCIUM: CPT

## 2020-12-16 PROCEDURE — 92507 TX SP LANG VOICE COMM INDIV: CPT

## 2020-12-16 PROCEDURE — 97535 SELF CARE MNGMENT TRAINING: CPT

## 2020-12-16 PROCEDURE — 99232 SBSQ HOSP IP/OBS MODERATE 35: CPT | Performed by: PSYCHIATRY & NEUROLOGY

## 2020-12-16 PROCEDURE — 2060000000 HC ICU INTERMEDIATE R&B

## 2020-12-16 PROCEDURE — 2580000003 HC RX 258: Performed by: PHYSICIAN ASSISTANT

## 2020-12-16 PROCEDURE — 82947 ASSAY GLUCOSE BLOOD QUANT: CPT

## 2020-12-16 PROCEDURE — APPSS15 APP SPLIT SHARED TIME 0-15 MINUTES: Performed by: NURSE PRACTITIONER

## 2020-12-16 PROCEDURE — 85027 COMPLETE CBC AUTOMATED: CPT

## 2020-12-16 PROCEDURE — 97116 GAIT TRAINING THERAPY: CPT

## 2020-12-16 PROCEDURE — 85055 RETICULATED PLATELET ASSAY: CPT

## 2020-12-16 PROCEDURE — 36415 COLL VENOUS BLD VENIPUNCTURE: CPT

## 2020-12-16 PROCEDURE — 97530 THERAPEUTIC ACTIVITIES: CPT

## 2020-12-16 PROCEDURE — 97110 THERAPEUTIC EXERCISES: CPT

## 2020-12-16 PROCEDURE — 99223 1ST HOSP IP/OBS HIGH 75: CPT | Performed by: INTERNAL MEDICINE

## 2020-12-16 PROCEDURE — 80048 BASIC METABOLIC PNL TOTAL CA: CPT

## 2020-12-16 RX ORDER — LEVETIRACETAM 500 MG/1
500 TABLET ORAL 2 TIMES DAILY
Status: DISCONTINUED | OUTPATIENT
Start: 2020-12-16 | End: 2020-12-19

## 2020-12-16 RX ORDER — DEXAMETHASONE 4 MG/1
4 TABLET ORAL DAILY
Status: COMPLETED | OUTPATIENT
Start: 2020-12-18 | End: 2020-12-19

## 2020-12-16 RX ADMIN — FAMOTIDINE 20 MG: 10 INJECTION INTRAVENOUS at 21:52

## 2020-12-16 RX ADMIN — ENOXAPARIN SODIUM 30 MG: 100 INJECTION SUBCUTANEOUS at 21:52

## 2020-12-16 RX ADMIN — ENOXAPARIN SODIUM 30 MG: 100 INJECTION SUBCUTANEOUS at 09:08

## 2020-12-16 RX ADMIN — DEXAMETHASONE 4 MG: 4 TABLET ORAL at 21:52

## 2020-12-16 RX ADMIN — DEXAMETHASONE 4 MG: 4 TABLET ORAL at 08:53

## 2020-12-16 RX ADMIN — SODIUM CHLORIDE, PRESERVATIVE FREE 10 ML: 5 INJECTION INTRAVENOUS at 21:53

## 2020-12-16 RX ADMIN — LEVETIRACETAM 500 MG: 500 TABLET, FILM COATED ORAL at 09:08

## 2020-12-16 RX ADMIN — STANDARDIZED SENNA CONCENTRATE AND DOCUSATE SODIUM 1 TABLET: 8.6; 5 TABLET ORAL at 08:53

## 2020-12-16 RX ADMIN — INSULIN LISPRO 1 UNITS: 100 INJECTION, SOLUTION INTRAVENOUS; SUBCUTANEOUS at 18:30

## 2020-12-16 RX ADMIN — CARVEDILOL 3.12 MG: 3.12 TABLET, FILM COATED ORAL at 21:52

## 2020-12-16 RX ADMIN — SODIUM CHLORIDE, PRESERVATIVE FREE 10 ML: 5 INJECTION INTRAVENOUS at 09:11

## 2020-12-16 RX ADMIN — LEVETIRACETAM 500 MG: 500 TABLET, FILM COATED ORAL at 21:52

## 2020-12-16 RX ADMIN — FAMOTIDINE 20 MG: 10 INJECTION INTRAVENOUS at 08:53

## 2020-12-16 RX ADMIN — DESMOPRESSIN ACETATE 40 MG: 0.2 TABLET ORAL at 21:52

## 2020-12-16 NOTE — PROGRESS NOTES
Nutrition Assessment     Type and Reason for Visit: Initial(Length of stay)    Nutrition Recommendations/Plan: Suggest liberalizing diet to general.     Nutrition Assessment:  Pt unable to accurately answer nutrition-related questions at this time. Spoke with RN who reports pt is eating well and ate well for breakfast this morning (needed assistance cutting up foods). Malnutrition Assessment:  Malnutrition Status: No malnutrition    Estimated Daily Nutrient Needs:  Energy (kcal): 0147-9252 kcals/day; Weight Used for Energy Requirements:  Current     Protein (g): 1.3-1.5 gm/kg = 115-130 gm/day; Weight Used for Protein Requirements:  Ideal          Nutrition Related Findings: Glucose 130 mg/dL      Current Nutrition Therapies:    DIET CARB CONTROL; Anthropometric Measures:  · Height: 6' 2\" (188 cm)  · Current Body Wt: 195 lb (88.5 kg)(with two pillows, blanket, etc)   · BMI: 25    Nutrition Diagnosis:   No nutrition diagnosis at this time     Nutrition Interventions:   Food and/or Nutrient Delivery:  Modify Current Diet(Suggest liberalizing diet to general)  Nutrition Education/Counseling:  No recommendation at this time   Coordination of Nutrition Care:  No recommendation at this time        Nutrition Monitoring and Evaluation:   Behavioral-Environmental Outcomes:  None Identified   Food/Nutrient Intake Outcomes:  Food and Nutrient Intake  Physical Signs/Symptoms Outcomes:  Biochemical Data, Weight     Discharge Planning:     Too soon to determine     Electronically signed by Jarred Fajardo, MS, RD, LD on 12/16/20 at 12:33 PM EST    Contact: [phone]

## 2020-12-16 NOTE — PROGRESS NOTES
Notified RAFAEL Phillip CM, via vm that post op OT is needed prior to final determination (per Dr Jose Jones).

## 2020-12-16 NOTE — CONSULTS
Today's Date: 12/16/2020  Patient Name: Anjelica Borjas  Date of admission: 12/9/2020  1:32 PM  Patient's age: 66 y.o., 1945  Admission Dx: Brain mass [G93.89]    Reason for Consult: management recommendations for left brain mass  Requesting Physician: Earlene Frias MD    CHIEF COMPLAINT: Altered mental status    History Obtained From:  electronic medical record, reason patient could not give history:  altered mental status    HISTORY OF PRESENT ILLNESS:      The patient is a 66 y.o.  male who is admitted to the hospital for acute encephalopathy and left brain mass. Patient presented with confusion and strokelike symptoms about a week ago. Found to have a left temporal lobe mass with cerebral edema and midline shift on brain imaging. He was managing neuro ICU and received steroids and antiseizure medications. He underwent craniotomy with resection of left temporal lobe tumor on 12/14/2020. Pathology report on sample obtained showed grade 4 glioblastoma multiforme. His past medical history includes essential hypertension and hyperlipidemia. He lost his wife to cancer. His 2 daughters are his designated health power of . Patient is pleasantly confused and is very hard of hearing. He appears to understand simple commands and conversations. He is currently on the neuro stepdown floor. Past Medical History:   has no past medical history on file. Past Surgical History:   has a past surgical history that includes craniotomy (Left, 12/14/2020) and craniotomy (Left, 12/14/2020). Medications:    Reviewed in Epic     Allergies:  Patient has no known allergies. Social History:        Family History: family history is not on file. REVIEW OF SYSTEMS: Limited due to patient's altered mental status  Constitutional: No fever or chills.   Eyes: No eye discharge, double vision, or eye pain   HEENT: negative for sore mouth, sore throat, hoarseness and voice change Respiratory: negative for cough , sputum, dyspnea, wheezing, hemoptysis, chest pain   Cardiovascular: negative for chest pain, dyspnea,  Gastrointestinal: negative for nausea, vomiting, diarrhea, constipation, abdominal pain, dysphagia  Genitourinary: negative for frequency, dysuria, nocturia, urinary incontinence, and hematuria   Integument: negative for rash   Hematologic/Lymphatic: negative for easy bruising, bleeding  Musculoskeletal: negative for myalgias, arthralgias, pain  Neurological: Positive for weakness, negative for headaches, dizziness    PHYSICAL EXAM:      BP (!) 143/73   Pulse 73   Temp 98 °F (36.7 °C) (Oral)   Resp 21   Ht 6' 2\" (1.88 m)   Wt 195 lb (88.5 kg) Comment: with two pillows, blanket  SpO2 97%   BMI 25.04 kg/m²    Temp (24hrs), Av.1 °F (36.7 °C), Min:96.7 °F (35.9 °C), Max:99.3 °F (37.4 °C)    General appearance - well appearing, not in pain or distress   Mental status - alert and cooperative   Eyes - pupils equal and reactive, extraocular eye movements intact   Ears -not examined  Mouth - mucous membranes moist, pharynx normal without lesions   Neck - supple, no significant adenopathy   Lymphatics - no palpable lymphadenopathy, no hepatosplenomegaly   Chest - clear to auscultation, no wheezes, rales or rhonchi, symmetric air entry   Heart - normal rate, regular rhythm, normal S1, S2, no murmurs  Abdomen - soft, nontender, nondistended, no masses or organomegaly   Neurological - alert, not oriented, distorted speech  Musculoskeletal - no joint tenderness, deformity or swelling   Extremities - peripheral pulses normal, no pedal edema, no clubbing or cyanosis   Skin - normal coloration and turgor, no rashes, no suspicious skin lesions noted ,    DATA:    Labs:   CBC:   Recent Labs     12/15/20  0454 12/16/20  0445   WBC 10.8 8.8   HGB 13.9 14.2   HCT 42.5 43.4   * See Reflexed IPF Result     BMP:   Recent Labs     12/15/20  0454 12/16/20  0445    137   K 3.9 4.0 CO2 20 20   BUN 28* 27*   CREATININE 0.85 0.79   LABGLOM >60 >60   GLUCOSE 139* 130*     PT/INR: No results for input(s): PROTIME, INR in the last 72 hours. IMAGING DATA:      Primary Problem  <principal problem not specified>    Active Hospital Problems    Diagnosis Date Noted    Thrombocytopenia ([facility] [address] [D69.6]     Acute encephalopathy [G93.40]     Cerebral edema ([facility] [address] [G93.6]     Brain mass [G93.89] 12/09/2020         IMPRESSION:   1. Glioblastoma multiforme. High-grade. 2. Cerebral edema. 3. Essential hypertension. 4. Mild thrombocytopenia. RECOMMENDATIONS:  1. Records, labs and imaging reviewed. Family to decide on course of action after discussion with attending. Discussed with family and Nurse. Thank you for asking us to see this patient. Shanthi Levin MD  PGY-3 Resident  Internal Medicine  [address] [facility]     Attending Physician Statement   I have discussed the care of Adria Cordoba, including pertinent history and exam findings with the resident. I have reviewed the key elements of all parts of the encounter with the resident. I have seen and examined the patient with the resident. I agree with the assessment and plan and status of the problem list as documented. Images and pathology results were reviewed and discussed with the patient and his daughter at bedside as well as his other daughter on the phone. I explained the nature of GBM, prognosis and treatment. I explained that the standard of care is to give combined chemoradiation after recovering from the craniotomy. We use Temodar during radiation therapy. Benefits and side effects were explained. Following chemoradiation we go to maintenance treatment using Temodar chemotherapy treatment 5 days every month. Patient lives in [place] [address] and the closest radiation unit could be in [address] [place] [address]. Patient and family will discuss his care with the rest of the family and they will decide whether to proceed with active treatment or to consider palliative care and supportive care only. Questions were answered to the best of their satisfaction and they verbalized full understanding. Thank you for allowing us to participate in the care of this pleasant patient. [facility]                            This note is created with the assistance of a speech recognition program.  While intending to generate a document that actually reflects the content of the visit, the document can still have some errors including those of syntax and sound a like substitutions which may escape proof reading. It such instances, actual meaning can be extrapolated by contextual diversion.

## 2020-12-16 NOTE — PROGRESS NOTES
Daily Progress Note  Neuro Critical Care    Patient Name: Reese Borges  Patient : 1945  Room/Bed: 4567/8488-66  Code Status: Full  Allergies: No Known Allergies    CHIEF COMPLAINT:      AMS     INTERVAL HISTORY    Initial Presentation (Admitted 20): The patient is a 73 y. o. male who presented to the emergency department for altered mental status.  Supposedly making Facebook posts last night that did not make sense. Khushi Lisa was concerned and called her sister who lives closer to patient. Samantha Soliman spoke to patient on the phone this afternoon and noted garbled speech and the patient suddenly stopped talking.  EMS was called.  Found patient altered with decreased responsiveness.  Also concern for left-sided facial droop.  Transported via [facility] with concern for stroke.  Vital stable in route with exception of mild hypertension.  Glucose within normal limits.  Stroke alert called upon arrival. Thania Melton last known well.  GCS between 8 and 10 upon arrival; protecting airway.  Initial NIH per stroke note of 20 for the following deficits: level of consciousness, facial palsy, left arm drift, weakness in lower extremities bilaterally, sensory loss, aphasia.  Was taken immediately for CT head which demonstrated left posterior temporal lobe mass of approximately 3 cm with surrounding vasogenic edema and minimal midline shift.  Neurosurgery team consulted. Charla Mckeon for MRI with and without contrast.  Hold steroids at this time.  Concern for possible seizure-like activity was loaded with 1 g of Keppra.  Labs significant for mild hyponatremia 133 as well as mild elevation in myoglobin at 111.  Also had mild troponin elevation but repeat troponin trending downward.  Family contacted by ER resident. Que Duvall known medical problems include hypertension and possible dementia per ED note.  No additional medical information on our records or in [facility] Course: 12/10: No acute events overnight. More alert this morning. A/O to person and can state that he is in hospital. Speaking in short sentences with mild dysarthria and broken speech. Persistent facial droop. Following commands. No pronator drift. Mild coughing with bedside swallow; speech to perform formal swallow study later today. 12/12: Cardiology consulted for surgical clearance. 12/13: Cardiology cleared for surgery, low risk. 12/14: Precedex trial completed in ICU last night, planning for awake craniotomy with resection and biopsy today with neurosurgery. 5-ALA being used, patient will need to return to a private room and remain in dark x 48 hours to reduce risk for dermal burns. CT head post-op showed postsurgical changed with residual edema, pneumocephalus, and mild extra-axial hemorrhage. 12/15: Coreg 3.125 mg BID started per cardiology for hypertension. Intra-op frozen consistent with BGM. Hem/onc consulted. Last 24h:   No acute events overnight. Patient doing well this morning. Able to discontinue dark room precautions this afternoon. Thrombocytopenia, trending down with platelets 337 this morning. Will obtain venous dopplers to rule out DVT. Awaiting PT/OT, PM&R following. Hem/Onc consulted, awaiting recommendations.      CURRENT MEDICATIONS:  SCHEDULED MEDICATIONS:   levETIRAcetam  500 mg Oral BID    carvedilol  3.125 mg Oral BID     dexamethasone  4 mg Oral 2 times per day    sodium chloride flush  10 mL Intravenous 2 times per day    sennosides-docusate sodium  1 tablet Oral BID    insulin lispro  0-6 Units Subcutaneous TID     enoxaparin  30 mg Subcutaneous BID    [Held by provider] potassium bicarb-citric acid  40 mEq Oral Daily    famotidine (PEPCID) injection  20 mg Intravenous BID    atorvastatin  40 mg Oral Nightly     CONTINUOUS INFUSIONS:   dextrose       PRN MEDICATIONS: labetalol, sodium chloride flush, sodium chloride flush, acetaminophen, oxyCODONE **OR** oxyCODONE, morphine **OR** morphine, magnesium hydroxide, bisacodyl, promethazine **OR** ondansetron, glucose, dextrose, glucagon (rDNA), dextrose, sodium chloride flush    VITALS:  Temperature Range: Temp: 98.3 °F (36.8 °C) Temp  Av.1 °F (36.7 °C)  Min: 97.8 °F (36.6 °C)  Max: 99.3 °F (37.4 °C)  BP Range: Systolic (69TPJ), PGZ:052 , Min:118 , RFF:101     Diastolic (83UWW), VUQ:29, Min:52, Max:79    Pulse Range: Pulse  Av.6  Min: 57  Max: 91  Respiration Range: Resp  Av.3  Min: 14  Max: 21  Current Pulse Ox: SpO2: 99 %  24HR Pulse Ox Range: SpO2  Av.2 %  Min: 95 %  Max: 100 %  Patient Vitals for the past 12 hrs:   BP Temp Temp src Pulse Resp SpO2   20 0407 137/67 98.3 °F (36.8 °C) Axillary 61 19 99 %   12/15/20 2314 118/66 98.1 °F (36.7 °C) Oral 69 17 99 %   12/15/20 1954 122/74 99.3 °F (37.4 °C) Oral 71 17 96 %     Estimated body mass index is 25.68 kg/m² as calculated from the following:    Height as of this encounter: 6' 2\" (1.88 m).     Weight as of this encounter: 200 lb (90.7 kg).  []<16 Severe malnutrition  []1616.99 Moderate malnutrition  []1718.49 Mild malnutrition  []18.524.9 Normal  [x]2529.9 Overweight (not obese)  []3034.9 Obese class 1 (Low Risk)  []3539.9 Obese class 2 (Moderate Risk)  []?40 Obese class 3 (High Risk)    RECENT LABS:   Lab Results   Component Value Date    WBC 8.8 2020    HGB 14.2 2020    HCT 43.4 2020    PLT See Reflexed IPF Result 2020    HDL 37 (L) 2020    ALT 28 2020    AST 23 2020     2020    K 4.0 2020     2020    CREATININE 0.79 2020    BUN 27 (H) 2020    CO2 20 2020    TSH 2.76 2020    INR 1.0 2020    LABA1C 5.5 12/10/2020     24 HOUR INTAKE/OUTPUT:    Intake/Output Summary (Last 24 hours) at 2020 5519  Last data filed at 2020 7224 Gross per 24 hour   Intake 566 ml   Output 900 ml   Net -334 ml       IMAGING:   MRI BRAIN W WO CONTRAST   Final Result   Expected postoperative findings status left temporal craniotomy with T2   prolongation identified within left temporal lobe extending along splenium   corpus callosum worrisome for possibility of underlying infiltrative tumor. Additionally there is evidence of questionable subependymal enhancement which   could be related to recent surgery/postoperative proteinaceous debris/blood   products versus early subependymal spread of tumor. There is evidence of intrinsic T1 shortening identified on operative bed   consistent with postop blood and proteinaceous debris postoperative changes   without convincing evidence of enhancing nodularity within the resection   cavity. Postoperative extra-axial fluid collection identified on left. 7 mm of left-to-right midline shift. Continued surveillance is recommended given the above findings. CT HEAD WO CONTRAST   Final Result   [   Status post left craniotomy with postsurgical changes in the left   temporoparietal lobe with residual edema, pneumocephaly  and mild extra-axial   hemorrhage. MRI BRAIN W WO CONTRAST   Final Result   Ring-enhancing left temporal lobe mass consistent with abscess versus primary   or secondary neoplasm. Local edema and slight midline shift. Mixed   cytotoxic and vasogenic edema. Microscopic hemorrhage. CT CHEST ABDOMEN PELVIS WO CONTRAST   Final Result   No acute disease. Incidental findings as above. CT LUMBAR SPINE TRAUMA RECONSTRUCTION   Final Result   CT thoracic spine: Degenerative and degenerative disc disease. No acute   fracture or traumatic malalignment. CT lumbar spine: No acute fracture. Patient has an anterolisthesis L5 upon   S1 and bilateral chronic spondylosis.   Multilevel degenerative and degenerative disc changes are noted as above. Multilevel disc bulges. Exiting L5-S1 nerve roots are elevated and borderline impinged. CT THORACIC SPINE TRAUMA RECONSTRUCTION   Final Result   CT thoracic spine: Degenerative and degenerative disc disease. No acute   fracture or traumatic malalignment. CT lumbar spine: No acute fracture. Patient has an anterolisthesis L5 upon   S1 and bilateral chronic spondylosis. Multilevel degenerative and   degenerative disc changes are noted as above. Multilevel disc bulges. Exiting L5-S1 nerve roots are elevated and borderline impinged. CT CERVICAL SPINE WO CONTRAST   Final Result   No acute abnormality of the cervical spine. XR CHEST PORTABLE   Final Result   No acute cardiopulmonary abnormality. CT BRAIN PERFUSION   Final Result   1. No perfusion mismatch. 2. Left temporal lobe mass demonstrates matching increased cerebral blood   flow cerebral blood volume and mean transit time. CTA HEAD NECK W CONTRAST   Final Result   1. Noncontrast CT of the head demonstrates presence of a 3 cm left posterior   temporal lobe mass with surrounding vasogenic edema. Minimal midline shift   from left to right is noted. 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 MRI of the brain with contrast is recommended. 2. Unremarkable CTA of the head and neck. The findings were sent to the Radiology Results [address] at 2:15   pm on 12/9/2020to be communicated to a licensed caregiver. CT HEAD WO CONTRAST   Final Result   1. Noncontrast CT of the head demonstrates presence of a 3 cm left posterior   temporal lobe mass with surrounding vasogenic edema. Minimal midline shift   from left to right is noted. 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 MRI of the brain with contrast is recommended. 2. Unremarkable CTA of the head and neck.    The findings were sent to the Radiology Results [address] at 2:15 pm on 12/9/2020to be communicated to a licensed caregiver. Labs and Images reviewed with:  [] Dr. Edelmira Flores. Roberta    [x] Dr. Eric Betancourt  [] Dr. Jeff Díaz  [] There are no new interval images to review. PHYSICAL EXAM       CONSTITUTIONAL:  Well developed, well nourished, alert and oriented x 2, in no acute distress. GCS 15. Nontoxic. Dysarthric speech. Expressive aphasia - stable. HEAD:  Normocephalic, staples intact, no drainage noted. EYES:  PERRLA, EOMI.   ENT:  moist mucous membranes   NECK:  supple, symmetric   LUNGS:  Equal air entry bilaterally   CARDIOVASCULAR:  normal s1 / s2, RRR, distal pulses intact   ABDOMEN:  Soft, no rigidity   NEUROLOGIC:  Mental Status:  Not oriented to date or place,awake             Cranial Nerves:    III: Pupils:  equal, round, reactive to light  III,IV,VI: Extra Ocular Movements: intact  VII: Facial strength: abnormal mild left facial droop    Motor Exam:    Drift:  absent  Tone:  normal    5 out of 5 motor strength in all extremities with exception of 4+ out of 5 in bilateral lower extremities. DRAINS:  [x] There are no drains for Neuro Critical Care to monitor at this time. ASSESSMENT AND PLAN:       This is a 73 y. o. male with altered mental status. Initially stroke alert due to dysarthria and concern for unilateral deficits.  Found to have a 3cm left temporal lobe brain mass, vasogenic edema, minimal midline shift. MRI showed ring enhancing lesion concerning for mass vs abscess.      NEUROLOGIC:  - CT head: left posterior temporal lobe bass approximately 3cm in diameter with surrounding vasogenic edema and minimal midline shift.  - AEDs: loaded with 1g keppra in ED; will continue keppra 500mg BID  - LTME negative for seizures  - Altered mental status of unclear etiology              - unclear last known well              - Brain mass on CT; concern for infectious etiology vs neoplasm             - MRI: Ring-enhancing left temporal lobe mass consistent with abscess versus neoplasm. Mixed cytotoxic and vasogenic edema with minimal midline shift. Microscopic hemorrhage. -NS recommending mass biopsy on Monday 12/14              - Decadron 4mg BID x 2 days, then 4mg daily  - POD # 2 Left temporal craniotomy for resection and 5-ALA guided resection  - Goal SBP <140  - Neuro checks per protocol     CARDIOVASCULAR:  - Goal SBP <140  - Hx of HTN per family but unclear of home meds  - Continue Coreg 3.125 mg BID  - continue lipitor 40 mg QD  - Lipid: mildly low HDL, otherwise unremarkable   - Echocardiogram: EF 55%, normal function  - Continue telemetry     PULMONARY:  - No respiratory distress; protectin airway  - CXR unremarkable  - CT chest/abd/pelvis without to evaluate for primary CA  - Keep sats >90%     RENAL/FLUID/ELECTROLYTE:  - BUN 27/ Creatinine 0.82  - normal kidney function  - Monitor I/Os  - IVF: Normal Saline @ 75 mL/hr  - Replace electrolytes PRN  - Daily BMP     GI/NUTRITION:  NUTRITION:  DIET DENTAL SOFT  - Bowel regimen: Senna-s daily, Glycolax PRN  - GI prophylaxis: Pepcid BID while on steroids  - ammonia and LFTs within normal limits  - CT Abd/pelvis without contrast no acute findings     ID:  - Afebrile, Tmax 37.4  - CXR normal  - Mild leukocytosis, WBC 8.8  - UA/Urine culture negatie  - covid negative (12/9/20)  - Continue to monitor for fevers  - Daily CBC     HEME:   - H&H 14.2/43. 3  - Mild thrombocytopenia, Platelets 809  - Follow up venous dopplers  - Hem/Onc consult  - Daily CBC     ENDOCRINE:  - Continue to monitor blood glucose, goal <180  - A1C pending 5.5  - LDSSI added while on Decadron     OTHER:  - PT/OT/ST   - Code Status: Full code  - PM&R Consult     PROPHYLAXIS:  Stress ulcer: H2 blocker     DVT PROPHYLAXIS:  - SCD sleeves - Thigh High   - Lovenox 20 mg BID    DISPOSITION:  Ok for stepdown - will sign out to neurology For any changes in exam or patient status please contact Neuro Critical Care.       MING Mariano - CNP  Neuro Critical Care  Pager [phone]  12/16/2020     7:50 AM

## 2020-12-16 NOTE — PROGRESS NOTES
Speech Language Pathology  Speech Language Pathology  [facility]    Speech Language Treatment Note    Date: 12/16/2020  Patients Name: Torsten Harris  MRN: [id number]  Diagnosis:   Patient Active Problem List   Diagnosis Code    Brain mass G93.89    Acute encephalopathy G93.40    Cerebral edema (HCC) G93.6    Thrombocytopenia ([facility] [address] D69.6       Pain: 0/10    Speech and Language Treatment  Treatment time: 1:39-1:55    Subjective: [x] Alert [x] Cooperative     [] Confused     [] Agitated    [] Lethargic    Objective/Assessment:  Auditory Comprehension:   1-step commands: 5/5 independently (pt required occasional repetitions d/t [place])  2-step commands: 2/5 increased to 4/5 with min verbal and visual cues and repetition of instruction. Verbal Expression:   Object ID: 4/7 increased to 7/7 with max verbal cues   [facility] Questions: 1/3, did not increase despite max verbal and visual cues   Biographical information: Pt oriented to first and last name independently, oriented to age and year with forced choice prompts given. Other: Pt pleasant and cooperative throughout session and expressed motivation to improve speech and language skills. Plan:  [x] Continue ST services    [] Discharge from ST:      Discharge recommendations: Further therapy recommended at discharge.     Treatment completed by: Petros Raza M.S. CF-SLP

## 2020-12-16 NOTE — PROGRESS NOTES
Physical Medicine & Rehabilitation  Progress Note        Admitting Physician: Bud Parada MD    Primary Care Provider: No primary care provider on file. Chief Complaint: AMS, Weakness    Brief History: This is a follow up to the initial consult on Mr. Effie Ha is a 66 y.o. right handed male who was admitted to [facility] on 12/9/2020 with Cerebrovascular Accident      Gentleman with Grade 4 glioblastoma s/p resection 12/14/2020 being seen in follow up. He has had functional decline since surgery regarding mobility, ADLs, speech. Neurosurgery is weaning decadron over next week. He is on seizure prophylaxis. Subjective: The patient is resting comfortably. The patient's mobility is diminished. He denies pain or headache at this time. ROS:  Constitutional: negative for anorexia, chills, fatigue, fevers, sweats and weight loss  Eyes: negative for redness and visual disturbance  Ears, nose, mouth, throat, and face: negative for earaches, epistaxis, sore throat and tinnitus  Respiratory: negative for cough and shortness of breath  Cardiovascular: negative for chest pain, dyspnea and palpitations  Gastrointestinal: negative for abdominal pain, change in bowel habits, constipation, nausea and vomiting  Genitourinary:negative for dysuria, frequency, hesitancy and urinary incontinence  Integument/breast: negative for pruritus and rash  Musculoskeletal:negative for stiff joints  Neurological: negative for dizziness, headaches   Behavioral/Psych: negative for decreased appetite, depression and fatigue    Rehabilitation:   Progressing in therapies. PT:  Restrictions/Precautions: Fall Risk  Other position/activity restrictions:  Up with assist   Transfers  Sit to Stand: Minimal Assistance  Stand to sit: Minimal Assistance  Bed to Chair: Minimal assistance, 2 Person Assistance  Comment: used RW  Ambulation 1  Surface: level tile  Device: Rolling Walker  Other Apparatus: Wheelchair follow UE Dressing: Minimal assistance, Increased time to complete  LE Dressing: Setup, Verbal cueing, Increased time to complete, Maximum assistance  Toileting: Increased time to complete, Moderate assistance  Additional Comments: Per RN pt. not to have lights on in room untill later this date for precautions from procceger. ADL/Grooming activity sitting EOB 35 minutes. Grooming activity SBA+set up and VCs for sequensing d/t  vision/lighting in room. UB bathe min A to dry L UE, per pt. \"I just cant see what I'm doing\". LB bathe max A to bathe below B knees d/t decreasd flexability and fatigue. LB dress max A to don/doff B socks, UB dress min A -CGA d/t difficulty with lines and decreased vision d/t environment. Balance  Sitting Balance: Stand by assistance  Standing Balance: Contact guard assistance   Standing Balance  Time: Sitting 35 minutes, standing- CGA-min A  Activity: Dynamic sitting- ADL/grooming activity, Dynamic stand- Min A-CGA side step to [facility]  Comment: utilizing RW  Functional Mobility  Functional - Mobility Device: Rolling Walker  Activity: To/from bathroom  Assist Level: Minimal assistance  Functional Mobility Comments: side steps to [facility]     Bed mobility  Supine to Sit: Stand by assistance  Sit to Supine: Stand by assistance  Scooting: Stand by assistance  Comment: increased time and effort to complete  Transfers  Stand Step Transfers: Minimal assistance  Sit to stand: Minimal assistance  Stand to sit: Minimal assistance  Transfer Comments: utilizing RW   Toilet Transfers  Toilet - Technique: Ambulating  Equipment Used: Standard toilet  Toilet Transfer: Minimal assistance  Toilet Transfers Comments: utilizing grab bar for sit><stand from low toilet BSC placed over toilet to increase height in attempt to increase pt independence             SLP:  Subjective: [x]? Alert     [x]? Cooperative     []? Confused     []? Agitated    []?  Lethargic     Objective/Assessment:  Auditory Comprehension: 1-step commands: 5/5 independently (pt required occasional repetitions d/t [place])  2-step commands: 2/5 increased to 4/5 with min verbal and visual cues and repetition of instruction.     Verbal Expression:   Object ID: 4/7 increased to 7/7 with max verbal cues   [address] Questions: 1/3, did not increase despite max verbal and visual cues   Biographical information: Pt oriented to first and last name independently, oriented to age and year with forced choice prompts given.      Other: Pt pleasant and cooperative throughout session and expressed motivation to improve speech and language skills. Objective:  BP (!) 143/73   Pulse 73   Temp 98 °F (36.7 °C) (Oral)   Resp 21   Ht 6' 2\" (1.88 m)   Wt 195 lb (88.5 kg) Comment: with two pillows, blanket  SpO2 97%   BMI 25.04 kg/m²       GEN: well developed, well nourished, in NAD  HEENT: L craniotomy incision, PERRL, EOMI, mucous membranes pink and moist. Healed cleft palate/lip. CV: RRR, no murmurs, rubs or gallops  PULM: CTAB, no rales or rhonchi. Respirations WNL and unlabored  ABD: soft, NT, ND, BS+ and equal  NEURO: A&O x3. Sensation intact to light touch. DTRs 2+  MSK: Functional ROM all extremities . Strength 4/5 key muscles BUEs, 3/5 key muscles BLEs  EXTREMITIES: No calf tenderness to palpation bilaterally. No edema BLEs  SKIN: warm dry and intact with good turgor except cranial incision  PSYCH: appropriately interactive. Affect WNL.      Current Medications:   Current Facility-Administered Medications: levETIRAcetam (KEPPRA) tablet 500 mg, 500 mg, Oral, BID  [START ON 12/18/2020] dexamethasone (DECADRON) tablet 4 mg, 4 mg, Oral, Daily  labetalol (NORMODYNE;TRANDATE) injection 10 mg, 10 mg, Intravenous, Q1H PRN  carvedilol (COREG) tablet 3.125 mg, 3.125 mg, Oral, BID WC  sodium chloride flush 0.9 % injection 10 mL, 10 mL, Intravenous, PRN  dexamethasone (DECADRON) tablet 4 mg, 4 mg, Oral, 2 times per day sodium chloride flush 0.9 % injection 10 mL, 10 mL, Intravenous, 2 times per day  sodium chloride flush 0.9 % injection 10 mL, 10 mL, Intravenous, PRN  acetaminophen (TYLENOL) tablet 650 mg, 650 mg, Oral, Q4H PRN  oxyCODONE (ROXICODONE) immediate release tablet 5 mg, 5 mg, Oral, Q4H PRN **OR** oxyCODONE (ROXICODONE) immediate release tablet 10 mg, 10 mg, Oral, Q4H PRN  morphine (PF) injection 2 mg, 2 mg, Intravenous, Q2H PRN **OR** morphine injection 4 mg, 4 mg, Intravenous, Q2H PRN  sennosides-docusate sodium (SENOKOT-S) 8.6-50 MG tablet 1 tablet, 1 tablet, Oral, BID  magnesium hydroxide (MILK OF MAGNESIA) 400 MG/5ML suspension 30 mL, 30 mL, Oral, Daily PRN  bisacodyl (DULCOLAX) suppository 10 mg, 10 mg, Rectal, Daily PRN  promethazine (PHENERGAN) tablet 12.5 mg, 12.5 mg, Oral, Q6H PRN **OR** ondansetron (ZOFRAN) injection 4 mg, 4 mg, Intravenous, Q6H PRN  insulin lispro (HUMALOG) injection vial 0-6 Units, 0-6 Units, Subcutaneous, TID WC  glucose (GLUTOSE) 40 % oral gel 15 g, 15 g, Oral, PRN  dextrose 50 % IV solution, 12.5 g, Intravenous, PRN  glucagon (rDNA) injection 1 mg, 1 mg, Intramuscular, PRN  dextrose 5 % solution, 100 mL/hr, Intravenous, PRN  enoxaparin (LOVENOX) injection 30 mg, 30 mg, Subcutaneous, BID  [Held by provider] potassium bicarb-citric acid (EFFER-K) effervescent tablet 40 mEq, 40 mEq, Oral, Daily  famotidine (PEPCID) injection 20 mg, 20 mg, Intravenous, BID  atorvastatin (LIPITOR) tablet 40 mg, 40 mg, Oral, Nightly  sodium chloride flush 0.9 % injection 10 mL, 10 mL, Intravenous, PRN      Diagnostics:     CBC:   Recent Labs     12/14/20  0412 12/15/20  0454 12/16/20  0445   WBC 10.0 10.8 8.8   RBC 4.71 4.49 4.59   HGB 14.9 13.9 14.2   HCT 44.4 42.5 43.4   MCV 94.3 94.7 94.6   RDW 12.8 13.2 13.1    132* See Reflexed IPF Result     BMP:   Recent Labs     12/14/20  0412 12/15/20  0454 12/16/20  0445   * 137 137   K 3.9 3.9 4.0    106 104   CO2 19* 20 20   PHOS 2.9 3.4 3.0 BUN 24* 28* 27*   CREATININE 0.78 0.85 0.79     BNP: No results for input(s): BNP in the last 72 hours. PT/INR: No results for input(s): PROTIME, INR in the last 72 hours. APTT: No results for input(s): APTT in the last 72 hours. CARDIAC ENZYMES: No results for input(s): CKMB, CKMBINDEX, TROPONINT in the last 72 hours. Invalid input(s): CKTOTAL;3  FASTING LIPID PANEL:  Lab Results   Component Value Date    HDL 37 (L) 12/09/2020     LIVER PROFILE: No results for input(s): AST, ALT, ALB, BILIDIR, BILITOT, ALKPHOS in the last 72 hours. Impression/Plan:    1. 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 In dark room until 12/16 pm. Weaning decadron until 12/20/2020. On insulin sliding scale while on decadron. Oxycodone prn pain. 2. Seizure prophylaxis: on Keppra   3. HTN/Hyperlipidemia: on carvedilol, atorvastatin labetalol available prn    Recommendation:  1. The patient will benefit from acute inpatient rehabilitation once medically stable per primary service. Anticipate he/she will be able to tolerate 3 hours of therapy per day in rehabilitation. The patient requires multidisciplinary rehabilitation treatment including medical management by a PM&R physician, 24 hour rehabilitation nursing, Physical/Occupational therapy, speech therapy, rehabilitation social work, and nutrition services. Patient and family also require education in post-hospital precautions and home exercise routine, adaptive techniques and deficit compensation strategies, strengthening and conditioning, equipment prescription and instructions in use. 2. Await post-op OT note. Has PT and SLP needs post-operatively.    3. DVT Prophylaxis: on Lovenox        Electronically signed by Jean Brooks MD on 12/16/2020 at 2:24 PM This note is created with the assistance of a speech recognition program.  While intending to generate a document that actually reflects the content of the visit, the document can still have some errors including those of syntax and sound a like substitutions which may escape proof reading. In such instances, actual meaning can be extrapolated by contextual diversion.

## 2020-12-16 NOTE — PROGRESS NOTES
Patient assessed for rehabilitation services?: Yes  Family / Caregiver Present: No  Diagnosis: stroke like symptoms  General Comment  Comments: RN ok'd for therapy this morning. Pt agreeable to participate in session and cooperative/pleasant throughout, although confused  Vital Signs  Patient Currently in Pain: Denies   Orientation  Orientation  Overall Orientation Status: Impaired  Orientation Level: Oriented to person;Disoriented to place; Disoriented to time;Disoriented to situation  Objective    ADL  Grooming: Stand by assistance;Setup;Verbal cueing  UE Bathing: Minimal assistance; Increased time to complete;Contact guard assistance  LE Bathing: Maximum assistance; Increased time to complete  UE Dressing: Minimal assistance; Increased time to complete  LE Dressing: Setup;Verbal cueing; Increased time to complete;Maximum assistance  Additional Comments: Per RN pt. not to have lights on in room untill later this date. ADL/Grooming activity sitting EOB 35 minutes. Grooming activity SBA+set up and VCs for sequencing d/t decreased vision/lighting in room. UB bathe min A to dry L UE, per pt. \"I just cant see what I'm doing\". LB bathe max A to bathe below B knees d/t decreased flexability and fatigue. LB dress max A to don/doff B socks, UB dress min A -CGA d/t difficulty with lines and decreased vision d/t environment.   Balance  Sitting Balance: Stand by assistance  Standing Balance: Contact guard assistance  Standing Balance  Time: Sitting 35 minutes, standing- CGA-min A  Activity: Dynamic sitting- ADL/grooming activity, Dynamic stand- Min A-CGA side step to [facility]  Comment: utilizing RW  Functional Mobility  Functional - Mobility Device: Rolling Walker  Assist Level: Minimal assistance  Functional Mobility Comments: side steps to [facility]  Bed mobility  Supine to Sit: Stand by assistance  Sit to Supine: Stand by assistance  Scooting: Stand by assistance  Comment: HOB upright, assist with lines  Transfers Stand Step Transfers: Minimal assistance  Sit to stand: Minimal assistance  Stand to sit: Minimal assistance  Transfer Comments: utilizing RW   Cognition  Overall Cognitive Status: Exceptions  Arousal/Alertness: Appropriate responses to stimuli  Following Commands: Follows one step commands with repetition; Follows one step commands with increased time  Attention Span: Attends with cues to redirect  Memory: Decreased short term memory;Decreased recall of biographical Information;Decreased recall of precautions;Decreased recall of recent events  Safety Judgement: Decreased awareness of need for assistance;Decreased awareness of need for safety  Problem Solving: Decreased awareness of errors;Assistance required to identify errors made;Assistance required to generate solutions;Assistance required to implement solutions;Assistance required to correct errors made  Insights: Decreased awareness of deficits  Initiation: Requires cues for some  Sequencing: Requires cues for some  Plan   Plan  Times per week: 3-5x/wk  Goals  Short term goals  Time Frame for Short term goals: pt will, by discharge  Short term goal 1: maintain attention to task for ~6 minutes with 2-3 verbal cues for redirection  Short term goal 2: complete LB ADLs with mod A, set up and AE, as needed  Short term goal 3: complete UB ADLs and grooming tasks with SBA and set up  Short term goal 4: increase activity tolerance to 20+ minutes in order to participate in daily tasks  Short term goal 5: dem SBA during functional transfers/functional mobility with LRD       Therapy Time   Individual Concurrent Group Co-treatment   Time In 1100         Time Out 1148         Minutes [address] [facility], ROMERO/L

## 2020-12-16 NOTE — PLAN OF CARE
Skin assessed for breakdown and redness, patient turned regularly, and heels elevated off of bed on pillows. Fall assessment preformed. Bed in low locked position with call light and tray table within reach. Education given. Will continue to monitor.

## 2020-12-16 NOTE — PROGRESS NOTES
Neurosurgery SUMAYA/Resident    Daily Progress Note   CC:  Chief Complaint   Patient presents with   [facility] Cerebrovascular Accident     12/16/2020  9:49 AM    Chart reviewed. No acute events overnight. No new complaints. Afebrile. Denies headache, nausea, vomiting, chest pain and SOB.  Some incisional pain    Vitals:    12/16/20 0407 12/16/20 0745 12/16/20 0758 12/16/20 0853   BP: 137/67 (!) 109/47  (!) 109/47   Pulse: 61 71  72   Resp: 19 18 16    Temp: 98.3 °F (36.8 °C) 96.7 °F (35.9 °C)     TempSrc: Axillary Oral     SpO2: 99% 96% 94%    Weight:       Height:           PE:   [id number] 14  AO to self, year, confused on location and why he is here  Mixed dysphagia similar to preop  PERRL, EOMI  Cranial Nerves:    II: Visual acuity:  normal  III: Pupils:  equal, round, reactive to light  III,IV,VI: Extra Ocular Movements:intact  V: Facial sensation:  intact  VII: Facial strength: intact  VIII: Hearing:  intact  IX: Palate:  intact  XI: Shoulder shrug: intact  XII: Tongue movement: intact    Motor    MAEE  Drift:  absent  Tone:  normal    Sensation: intact   Incision: left temporal craniotomy site intact closed with Dermabond, no drainage noted, some redness to face and head noted      Lab Results   Component Value Date    WBC 8.8 12/16/2020    HGB 14.2 12/16/2020    HCT 43.4 12/16/2020    PLT See Reflexed IPF Result 12/16/2020    HDL 37 (L) 12/09/2020    ALT 28 12/09/2020    AST 23 12/09/2020     12/16/2020    K 4.0 12/16/2020     12/16/2020    CREATININE 0.79 12/16/2020    BUN 27 (H) 12/16/2020    CO2 20 12/16/2020    TSH 2.76 12/09/2020    INR 1.0 12/09/2020    LABA1C 5.5 12/10/2020           A/P  66 y.o. male who presents with left temporal brain mass  POD#2 s/p awake craniotomy with speech mapping and 5 ALA guided resection of left temporal tumor   frozen section intra-op consistent with GBM       - PT and OT for mobilization  - ok to be out of the dark about 1800 today   - ok to resume lovenox for DVT ppx

## 2020-12-16 NOTE — PROGRESS NOTES
Ambulation 1  Surface: level tile  Device: Rolling Walker  Assistance: Minimal assistance  Quality of Gait: one incident of LOB noted. pt was able to recover requiring Vj  Gait Deviations: Slow [place];Decreased step length;Decreased step height  Distance: 20ft  Comments: distance was limited due to the pt not being mago to leave room with 72 hr light ristrictions. Pt c/o of not being able to see very well, required Vj for RW management to help guide through obstacles  Stairs/Curb  Stairs?: No     Balance  Posture: Good  Sitting - Static: Good  Sitting - Dynamic: Good  Standing - Static: Fair;+  Standing - Dynamic: Fair;-  Comments: Standing balance assessed w/ RW; pt able to sit EOB SBA ~10 minutes  Exercises  Seated LE exercise program: Long Arc Quads, hip abduction/adduction, heel/toe raises, and marches. Reps: x10  Standing exercise program: Heel/toe raises, hip flexion Reps: x10  Goals  Short term goals  Time Frame for Short term goals: 14  Short term goal 1: Pt to perform bed mobility CGA  Short term goal 2: Pt to demonstrate functional transfers CGA  Short term goal 3: Ambulate 100ft w/ least restrictive AD CGA  Short term goal 4: Demonstrate standing balance of good - to decrease fall risk  Patient Goals   Patient goals : To go home    Plan    Plan  Times per week: 5-6x/week  Current Treatment Recommendations: Strengthening, Transfer Training, Endurance Training, Patient/Caregiver Education & Training, ROM, Balance Training, Gait Training, Home Exercise Program, Functional Mobility Training, Stair training, Safety Education & Training  Safety Devices  Type of devices: Gait belt, Nurse notified(Pt transferring to [facility] at end of session.  In W/C accompanied by RN.)  Restraints  Initially in place: No     Therapy Time   Individual Concurrent Group Co-treatment   Time In 6350         Time Out 1110         Minutes 38         Timed Code Treatment Minutes: [address] [facility] [Mother] : mother [Routine Follow-Up] : a routine follow-up visit for

## 2020-12-16 NOTE — PROGRESS NOTES
[address]  [facility], [address] [place] [address], [address] [place] [address]  Ph: [phone] or [phone]  FAX: [phone]        Reason for consult: Confusion    I had the pleasure of seeing your patient in neurology consultation for his symptoms. As you would recall Pastor Batres is a 66 y.o. yo male admitted on 12/9/2020. The patient presented with left temporal mass with vasogenic edema. Subsequently he underwent biopsy which confirmed GBS. As per the daughter, the patient otherwise has been fairly healthy and has been working part-time at a time recently. No prior history of seizures. No history of stroke. No history of cognitive. [place] History reviewed. No pertinent past medical history. Past Surgical History:   Procedure Laterality Date    CRANIOTOMY Left 12/14/2020    temporal for tumor    CRANIOTOMY Left 12/14/2020    TEMPORAL CRANIOTOMY FOR TUMOR, IMAGE GUIDED, AWAKE performed by Glenn Mathew DO at [address] [place] [address] History     Socioeconomic History    Marital status:       Spouse name: Not on file    Number of children: Not on file    Years of education: Not on file    Highest education level: Not on file   Occupational History    Not on file   Social Needs    Financial resource strain: Not on file    Food insecurity     Worry: Not on file     Inability: Not on file    Transportation needs     Medical: Not on file     Non-medical: Not on file   Tobacco Use    Smoking status: Unknown If Ever Smoked   Substance and Sexual Activity    Alcohol use: Not on file     Comment: SAUNDRA     Drug use: Not on file    Sexual activity: Not on file     Comment: SAUNDRA   Lifestyle    Physical activity     Days per week: Not on file     Minutes per session: Not on file    Stress: Not on file   Relationships    Social connections     Talks on phone: Not on file     Gets together: Not on file     Attends Voodoo service: Not on file Active member of club or organization: Not on file     Attends meetings of clubs or organizations: Not on file     Relationship status: Not on file    Intimate partner violence     Fear of current or ex partner: Not on file     Emotionally abused: Not on file     Physically abused: Not on file     Forced sexual activity: Not on file   Other Topics Concern    Not on file   Social History Narrative    Not on file     History reviewed. No pertinent family history. No Known Allergies   /60   Pulse 73   Temp 97.2 °F (36.2 °C) (Oral)   Resp 14   Ht 6' 2\" (1.88 m)   Wt 195 lb (88.5 kg) Comment: with two pillows, blanket  SpO2 97%   BMI 25.04 kg/m²      ROS: Unable to obtain due to patient's condition  Neurological examination:    Mental status   Global speech aphasia     Cranial nerves   II - visual fields intact to confrontation                                                III, IV, VI  extra-ocular muscles full: no pupillary defect; no NEW, no nystagmus, no ptosis                                                                      V - normal facial sensation                                                               VII -right lower facial droop                                                         VIII - intact hearing                                                                             IX, X - symmetrical palate                                                                  XI - symmetrical shoulder shrug                                                       XII - midline tongue without atrophy or fasciculation     Motor function   grossly intact     Sensory function  unable to obtain due to patient condition     Cerebellar No involuntary movements or tremors     Reflex function Intact 2+ DTR and symmetric.  Negative Babinski     Gait                  Not tested         Lab Results   Component Value Date    LDLCHOLESTEROL 52 12/09/2020     No components found for: [place] No results found for: TRIG  Lab Results   Component Value Date    HDL 37 (L) 12/09/2020     No results found for: LDLCALC  No results found for: LABVLDL  Lab Results   Component Value Date    LABA1C 5.5 12/10/2020     Lab Results   Component Value Date     12/10/2020     Lab Results   Component Value Date    [id number] 12/09/2020      Neurological work up:  CT head  CTA head and neck  MRI brain     2 D echo     Assessment and Recommendations:   Left temporal GBM, confirmed on biopsy status post craniotomy    The family is getting towards palliative care. They intend to have a family discussion tomorrow  Continued Keppra 500 mg twice daily for seizure prevention  We will follow. Thank you for the consult      Brandon Collins MD  Neurology    This note is created with the assistance of a speech-recognition program. While intending to generate a document that actually reflects the content of the visit, the document can still have some errors including those of syntax and sound a- like substitutions which may escape proofreading. In such instances, actual meaning can be extrapolated by contextual derivation.

## 2020-12-17 LAB
ANION GAP SERPL CALCULATED.3IONS-SCNC: 11 MMOL/L (ref 9–17)
BUN BLDV-MCNC: 28 MG/DL (ref 8–23)
BUN/CREAT BLD: ABNORMAL (ref 9–20)
CALCIUM IONIZED: 1.04 MMOL/L (ref 1.13–1.33)
CALCIUM SERPL-MCNC: 8.1 MG/DL (ref 8.6–10.4)
CHLORIDE BLD-SCNC: 104 MMOL/L (ref 98–107)
CO2: 22 MMOL/L (ref 20–31)
CREAT SERPL-MCNC: 0.77 MG/DL (ref 0.7–1.2)
GFR AFRICAN AMERICAN: >60 ML/MIN
GFR NON-AFRICAN AMERICAN: >60 ML/MIN
GFR SERPL CREATININE-BSD FRML MDRD: ABNORMAL ML/MIN/{1.73_M2}
GFR SERPL CREATININE-BSD FRML MDRD: ABNORMAL ML/MIN/{1.73_M2}
GLUCOSE BLD-MCNC: 120 MG/DL (ref 75–110)
GLUCOSE BLD-MCNC: 135 MG/DL (ref 70–99)
GLUCOSE BLD-MCNC: 137 MG/DL (ref 75–110)
GLUCOSE BLD-MCNC: 139 MG/DL (ref 75–110)
HCT VFR BLD CALC: 43.9 % (ref 40.7–50.3)
HEMOGLOBIN: 15.2 G/DL (ref 13–17)
MCH RBC QN AUTO: 31.4 PG (ref 25.2–33.5)
MCHC RBC AUTO-ENTMCNC: 34.6 G/DL (ref 28.4–34.8)
MCV RBC AUTO: 90.7 FL (ref 82.6–102.9)
NRBC AUTOMATED: 0 PER 100 WBC
PDW BLD-RTO: 13.1 % (ref 11.8–14.4)
PHOSPHORUS: 3.8 MG/DL (ref 2.5–4.5)
PLATELET # BLD: NORMAL K/UL (ref 138–453)
PLATELET, FLUORESCENCE: 119 K/UL (ref 138–453)
PLATELET, IMMATURE FRACTION: 2.5 % (ref 1.1–10.3)
PMV BLD AUTO: NORMAL FL (ref 8.1–13.5)
POTASSIUM SERPL-SCNC: 4.4 MMOL/L (ref 3.7–5.3)
RBC # BLD: 4.84 M/UL (ref 4.21–5.77)
SODIUM BLD-SCNC: 137 MMOL/L (ref 135–144)
WBC # BLD: 6.7 K/UL (ref 3.5–11.3)

## 2020-12-17 PROCEDURE — 99232 SBSQ HOSP IP/OBS MODERATE 35: CPT | Performed by: PHYSICAL MEDICINE & REHABILITATION

## 2020-12-17 PROCEDURE — 2500000003 HC RX 250 WO HCPCS: Performed by: PHYSICIAN ASSISTANT

## 2020-12-17 PROCEDURE — 99232 SBSQ HOSP IP/OBS MODERATE 35: CPT | Performed by: INTERNAL MEDICINE

## 2020-12-17 PROCEDURE — 97110 THERAPEUTIC EXERCISES: CPT

## 2020-12-17 PROCEDURE — APPSS30 APP SPLIT SHARED TIME 16-30 MINUTES: Performed by: REGISTERED NURSE

## 2020-12-17 PROCEDURE — 93970 EXTREMITY STUDY: CPT

## 2020-12-17 PROCEDURE — 6370000000 HC RX 637 (ALT 250 FOR IP): Performed by: NURSE PRACTITIONER

## 2020-12-17 PROCEDURE — 36415 COLL VENOUS BLD VENIPUNCTURE: CPT

## 2020-12-17 PROCEDURE — 97530 THERAPEUTIC ACTIVITIES: CPT

## 2020-12-17 PROCEDURE — 6370000000 HC RX 637 (ALT 250 FOR IP): Performed by: PHYSICIAN ASSISTANT

## 2020-12-17 PROCEDURE — 2580000003 HC RX 258: Performed by: PHYSICIAN ASSISTANT

## 2020-12-17 PROCEDURE — 85055 RETICULATED PLATELET ASSAY: CPT

## 2020-12-17 PROCEDURE — 6360000002 HC RX W HCPCS: Performed by: STUDENT IN AN ORGANIZED HEALTH CARE EDUCATION/TRAINING PROGRAM

## 2020-12-17 PROCEDURE — 82947 ASSAY GLUCOSE BLOOD QUANT: CPT

## 2020-12-17 PROCEDURE — 84100 ASSAY OF PHOSPHORUS: CPT

## 2020-12-17 PROCEDURE — 99232 SBSQ HOSP IP/OBS MODERATE 35: CPT | Performed by: PSYCHIATRY & NEUROLOGY

## 2020-12-17 PROCEDURE — 82330 ASSAY OF CALCIUM: CPT

## 2020-12-17 PROCEDURE — 2060000000 HC ICU INTERMEDIATE R&B

## 2020-12-17 PROCEDURE — 92507 TX SP LANG VOICE COMM INDIV: CPT

## 2020-12-17 PROCEDURE — 85027 COMPLETE CBC AUTOMATED: CPT

## 2020-12-17 PROCEDURE — 80048 BASIC METABOLIC PNL TOTAL CA: CPT

## 2020-12-17 PROCEDURE — 97116 GAIT TRAINING THERAPY: CPT

## 2020-12-17 RX ADMIN — ENOXAPARIN SODIUM 30 MG: 100 INJECTION SUBCUTANEOUS at 09:58

## 2020-12-17 RX ADMIN — STANDARDIZED SENNA CONCENTRATE AND DOCUSATE SODIUM 1 TABLET: 8.6; 5 TABLET ORAL at 09:58

## 2020-12-17 RX ADMIN — SODIUM CHLORIDE, PRESERVATIVE FREE 10 ML: 5 INJECTION INTRAVENOUS at 09:58

## 2020-12-17 RX ADMIN — LEVETIRACETAM 500 MG: 500 TABLET, FILM COATED ORAL at 09:58

## 2020-12-17 RX ADMIN — CARVEDILOL 3.12 MG: 3.12 TABLET, FILM COATED ORAL at 09:58

## 2020-12-17 RX ADMIN — ENOXAPARIN SODIUM 30 MG: 100 INJECTION SUBCUTANEOUS at 20:01

## 2020-12-17 RX ADMIN — FAMOTIDINE 20 MG: 10 INJECTION INTRAVENOUS at 09:58

## 2020-12-17 RX ADMIN — LEVETIRACETAM 500 MG: 500 TABLET, FILM COATED ORAL at 20:02

## 2020-12-17 RX ADMIN — SODIUM CHLORIDE, PRESERVATIVE FREE 10 ML: 5 INJECTION INTRAVENOUS at 20:02

## 2020-12-17 RX ADMIN — FAMOTIDINE 20 MG: 10 INJECTION INTRAVENOUS at 20:02

## 2020-12-17 RX ADMIN — DESMOPRESSIN ACETATE 40 MG: 0.2 TABLET ORAL at 20:19

## 2020-12-17 RX ADMIN — CARVEDILOL 3.12 MG: 3.12 TABLET, FILM COATED ORAL at 17:47

## 2020-12-17 NOTE — PROGRESS NOTES
Speech Language Pathology  Speech Language Pathology  [facility]    Speech Language Treatment Note    Date: 12/17/2020  Patients Name: Reese Borges  MRN: [id number]  Diagnosis:   Patient Active Problem List   Diagnosis Code    Brain mass G93.89    Acute encephalopathy G93.40    Cerebral edema (HCC) G93.6    Thrombocytopenia (HCC) D69.6    GBM (glioblastoma multiforme) ([facility] [address] C71.9       Pain: 0/10    Speech and Language Treatment  Treatment time: [id number]    Subjective: [x] Alert [x] Cooperative     [] Confused     [] Agitated    [] Lethargic    Objective/Assessment:  Auditory Comprehension:   1-step commands: 5/5 independently (pt required occasional repetitions d/t [place])  2-step commands: 1/5 increased to 4/5 with min verbal and visual cues and repetition of instruction. Verbal Expression:   Object ID: 3/6 increased to 6/6 with max verbal cues   [facility] Questions: 2/6, did not increase despite max verbal and visual cues   Biographical information: Pt oriented to first and last name independently, oriented to age and year with forced choice prompts given, not oriented to hospital    Pt. With appropriate simple conversational responses with paraphasic errors noted at times. Other: Pt pleasant and cooperative throughout session and expressed motivation to improve speech and language skills. Plan:  [x] Continue ST services    [] Discharge from ST:      Discharge recommendations: Further therapy recommended at discharge. Treatment completed by:  Eva Casillas M.S. CF-SLP

## 2020-12-17 NOTE — CARE COORDINATION
Transition planning  Received call from pt's daughter Naomi, she states she and her sister do not want patient to go to acute rehab as he is \"terminal\" and they would like palliative care. Naomi states the plan was for the patient to go home with her but she has Covid now and that is not possible. Her sister Roselia Guadarrama lives out of town and cannot care for patient. They would like patient to go to [facility] inpatient hospice until Naomi would be able to care for patient at home. They would prefer patient not to go to a SNF. 1630 Referral faxed and called to [facility] at [address]. She states she will call and talk with daughter Naomi re: inpatient hospice, she states they may be able to accept patient until patient is able to go home with daughter, patient may have co-pay, she will discuss with Naomi and their medical director and let CM know if they are able to accept patient.

## 2020-12-17 NOTE — PLAN OF CARE
Problem: Falls - Risk of:  Goal: Will remain free from falls  Description: Will remain free from falls  Outcome: Ongoing  Goal: Absence of physical injury  Description: Absence of physical injury  Outcome: Ongoing   Patient remained free from injury. Patient verbalized understanding of need for the safety precautions. Demonstrates proper use of assistive devices. Bed remains in the lowest position. Call light remains within reach. [place] [facility] in use.      Problem: Skin Integrity:  Goal: Will show no infection signs and symptoms  Description: Will show no infection signs and symptoms  Outcome: Ongoing  Goal: Absence of new skin breakdown  Description: Absence of new skin breakdown  Outcome: Ongoing

## 2020-12-17 NOTE — PROGRESS NOTES
Sit to Stand: Minimal Assistance  Stand to sit: Minimal Assistance  Comment: used RW  Ambulation  Ambulation?: Yes  Ambulation 1  Surface: level tile  Device: Rolling Walker  Gait Deviations: Slow Juliet;Decreased step length;Decreased step height  Distance: 28'  Comments: increased time and effort to complete due to SOB and increased fatigue. cueing for sequencing t/o fair return. Stairs/Curb  Stairs?: No     Balance  Posture: Good  Sitting - Static: Good  Sitting - Dynamic: Good  Standing - Static: Fair;+  Standing - Dynamic: Fair;-  Comments: Standing balance assessed w/ RW; pt able to sit EOB SBA ~10 minutes  Exercises  Seated LE exercise program: Long Arc Quads, hip abduction/adduction, heel/toe raises, and marches. Reps: x10  Goals  Short term goals  Time Frame for Short term goals: 15  Short term goal 1: Pt to perform bed mobility CGA  Short term goal 2: Pt to demonstrate functional transfers CGA  Short term goal 3: Ambulate 100ft w/ least restrictive AD CGA  Short term goal 4: Demonstrate standing balance of good - to decrease fall risk  Patient Goals   Patient goals : To go home    Plan    Plan  Times per week: 5-6x/week  Current Treatment Recommendations: Strengthening, Transfer Training, Endurance Training, Patient/Caregiver Education & Training, ROM, Balance Training, Gait Training, Home Exercise Program, Functional Mobility Training, Stair training, Safety Education & Training  Safety Devices  Type of devices: Gait belt, Nurse notified(Pt transferring to [facility] at end of session.  In W/C accompanied by RN.)  Restraints  Initially in place: No     Therapy Time   Individual Concurrent Group Co-treatment   Time In 1138         Time Out 1216         Minutes 38         Timed Code Treatment Minutes: 109 [facility]

## 2020-12-17 NOTE — PROGRESS NOTES
Physical Medicine & Rehabilitation  Progress Note    12/17/2020 11:33 AM     CC: Ambulatory and ADL dysfunction due to change in mental status and weaknessglioblastoma status post resection    Brief history  7year-old male admitted 12/9/2020 found to have grade 4 glioblastoma status post resection 12/14/2020. He is weaning off Decadron and is on seizure prophylaxis    Hematology/oncologyplan for chemoradiation, family to decide on active treatment or palliative care    Neurosurgeryokay to resume Lovenox surgical pathology grade 4 glioblastoma, wean Decadron over 1 week keep Keppra for 6 months    Neuro critical Decadron 4 mg twice daily x2 days then 4 mg dailyclarify taper on Lovenox    Subjective:   No complaints. ROS:  Denies fevers, chills, sweats. No chest pain, palpitations, lightheadedness. Denies coughing, wheezing or shortness of breath. Denies abdominal pain, nausea, diarrhea or constipation. No new areas of joint pain. Denies new areas of numbness or weakness. Denies new anxiety or depression issues. No new skin problems. Rehabilitation:   PT:  Restrictions/Precautions: Fall Risk  Other position/activity restrictions: Up with assist   Transfers  Sit to Stand: Minimal Assistance  Stand to sit: Minimal Assistance  Bed to Chair: Minimal assistance, 2 Person Assistance  Comment: used RW  Ambulation 1  Surface: level tile  Device: Rolling Walker  Other Apparatus: Wheelchair follow  Assistance: Minimal assistance  Quality of Gait: one incident of LOB noted. pt was able to recover requiring Vj  Gait Deviations: Slow Juliet, Decreased step length, Decreased step height  Distance: 20ft  Comments: distance was limited due to the pt not being mago to leave room with 72 hr light ristrictions.  Pt c/o of not being able to see very well, required Vj for RW management to help guide through obstacles          OT:  ADL  Feeding: Supervision  Grooming: Stand by assistance, Setup, Verbal cueing UE Bathing: Minimal assistance, Increased time to complete, Contact guard assistance  LE Bathing: Maximum assistance, Increased time to complete  UE Dressing: Minimal assistance, Increased time to complete  LE Dressing: Setup, Verbal cueing, Increased time to complete, Maximum assistance  Toileting: Increased time to complete, Moderate assistance  Additional Comments: Per RN pt. not to have lights on in room untill later this date for precautions from procceger. ADL/Grooming activity sitting EOB 35 minutes. Grooming activity SBA+set up and VCs for sequensing d/t  vision/lighting in room. UB bathe min A to dry L UE, per pt. \"I just cant see what I'm doing\". LB bathe max A to bathe below B knees d/t decreasd flexability and fatigue. LB dress max A to don/doff B socks, UB dress min A -CGA d/t difficulty with lines and decreased vision d/t environment.          Balance  Sitting Balance: Stand by assistance  Standing Balance: Contact guard assistance   Standing Balance  Time: Sitting 35 minutes, standing- CGA-min A  Activity: Dynamic sitting- ADL/grooming activity, Dynamic stand- Min A-CGA side step to [facility]  Comment: utilizing RW  Functional Mobility  Functional - Mobility Device: Rolling Walker  Activity: To/from bathroom  Assist Level: Minimal assistance  Functional Mobility Comments: side steps to [facility]     Bed mobility  Supine to Sit: Stand by assistance  Sit to Supine: Stand by assistance  Scooting: Stand by assistance  Comment: increased time and effort to complete  Transfers  Stand Step Transfers: Minimal assistance  Sit to stand: Minimal assistance  Stand to sit: Minimal assistance  Transfer Comments: utilizing RW   Toilet Transfers  Toilet - Technique: Ambulating  Equipment Used: Standard toilet  Toilet Transfer: Minimal assistance  Toilet Transfers Comments: utilizing grab bar for sit><stand from low toilet BSC placed over toilet to increase height in attempt to increase pt independence               ST: Auditory Comprehension:   1-step commands: 5/5 independently (pt required occasional repetitions d/t [place])  2-step commands: 1/5 increased to 4/5 with min verbal and visual cues and repetition of instruction.     Verbal Expression:   Object ID: 3/6 increased to 6/6 with max verbal cues   [facility] Questions: /, did not increase despite max verbal and visual cues   Biographical information: Pt oriented to first and last name independently, oriented to age and year with forced choice prompts given, not oriented to hospital     Pt. With appropriate simple conversational responses with paraphasic errors noted at times. Objective:  /78   Pulse 58   Temp 98.3 °F (36.8 °C) (Oral)   Resp 17   Ht 6' 2\" (1.88 m)   Wt 195 lb (88.5 kg) Comment: with two pillows, blanket  SpO2 95%   BMI 25.04 kg/m²  I Body mass index is 25.04 kg/m². I   Wt Readings from Last 1 Encounters:   20 195 lb (88.5 kg)      Temp (24hrs), Av.3 °F (36.8 °C), Min:97.2 °F (36.2 °C), Max:98.7 °F (37.1 °C)         GEN: well developed, well nourished, no acute distress  HEENT: Normocephalic atraumatic, EOMI, mucous membranes pink and moist scalp incision clean, decreased hearing  CV: RRR, no murmurs, rubs or gallops  PULM: CTAB, no rales or rhonchi. Respirations WNL and unlabored  ABD: soft, NT, ND, +BS and equal  NEURO: A&O x3. Sensation intact to light touch. MSK: 4-5 upper extremities 3-4/5 lower extremities  EXTREMITIES: No calf tenderness to palpation bilaterally. No edema BLEs  SKIN: warm dry and intact with good turgor  PSYCH: appropriately interactive. Affect WNL.           Medications   Scheduled Meds:   levETIRAcetam  500 mg Oral BID    [START ON 2020] dexamethasone  4 mg Oral Daily    carvedilol  3.125 mg Oral BID     sodium chloride flush  10 mL Intravenous 2 times per day    sennosides-docusate sodium  1 tablet Oral BID    insulin lispro  0-6 Units Subcutaneous TID     enoxaparin  30 mg Subcutaneous BID  [Held by provider] potassium bicarb-citric acid  40 mEq Oral Daily    famotidine (PEPCID) injection  20 mg Intravenous BID    atorvastatin  40 mg Oral Nightly     Continuous Infusions:   dextrose       PRN Meds:.labetalol, sodium chloride flush, sodium chloride flush, acetaminophen, oxyCODONE **OR** oxyCODONE, morphine **OR** morphine, magnesium hydroxide, bisacodyl, promethazine **OR** ondansetron, glucose, dextrose, glucagon (rDNA), dextrose, sodium chloride flush     Diagnostics:     CBC:   Recent Labs     12/15/20  0454 12/16/20  0445 12/17/20  0711   WBC 10.8 8.8 6.7   RBC 4.49 4.59 4.84   HGB 13.9 14.2 15.2   HCT 42.5 43.4 43.9   MCV 94.7 94.6 90.7   RDW 13.2 13.1 13.1   * See Reflexed IPF Result See Reflexed IPF Result     BMP:   Recent Labs     12/15/20  0454 12/16/20  0445 12/17/20  0711    137 137   K 3.9 4.0 4.4    104 104   CO2 20 20 22   PHOS 3.4 3.0 3.8   BUN 28* 27* 28*   CREATININE 0.85 0.79 0.77     BNP: No results for input(s): BNP in the last 72 hours. PT/INR: No results for input(s): PROTIME, INR in the last 72 hours. APTT: No results for input(s): APTT in the last 72 hours. CARDIAC ENZYMES: No results for input(s): CKMB, CKMBINDEX, TROPONINT in the last 72 hours. Invalid input(s): CKTOTAL;3  FASTING LIPID PANEL:  Lab Results   Component Value Date    HDL 37 (L) 12/09/2020     LIVER PROFILE: No results for input(s): AST, ALT, ALB, BILIDIR, BILITOT, ALKPHOS in the last 72 hours. I/O (24Hr): Intake/Output Summary (Last 24 hours) at 12/17/2020 1133  Last data filed at 12/17/2020 0733  Gross per 24 hour   Intake    Output 1000 ml   Net -1000 ml       Glu last 24 hour  Recent Labs     12/16/20  1619 12/16/20  1933 12/16/20  2148 12/17/20  0811   POCGLU 151* 172* 125* 120*       No results for input(s): CLARITYU, COLORU, PHUR, SPECGRAV, PROTEINU, RBCUA, BLOODU, BACTERIA, NITRU, WBCUA, LEUKOCYTESUR, YEAST, Wilhel[place]a Lynn in the last 72 hours. Impression: Mr. Torsten Harris is a 66 y.o.  male with a history of <principal problem not specified>     1.  Left posterior temporal mass, change in mental status, left facial droopplan for resection/biopsy 12/14 Decadron taper over 1 week per neurosurgery, neuro critical notes 4 mg twice a day for 2 days then 4 mg then dailynoted per orders for 2 days  2. Seizure Kepprakeep for 6 months  3. Aphasia  hard of hearing  4. GERDPepcid  5. GBS grade 4oncology has discussed treatment options chemoradiation, family deciding on treatment versus palliative, clarify timing of radiation  6. Painmorphinehas not received for 3 days, Roxicodone, Tylenol  7. HypertensionCoreg     Recommendations:  1. Diagnosis: Left temporal mass with left facial droop, change in mental status, aphasia, GBS grade 4  2. Therapy:  Min assist transfers and ambulation 20 feet ADLs max assist lower extremities, cognitive issues  3. Medical  Necessity: As above  4. Support: Clarify, will need 24/7 due to cognitive deficitspatient vague on support, states wife passed awayper social work notedaughter planning on moving in  5. Rehab recommendation: will benefit from acute inpatient rehabilitation when medically ready, however will need to clarify questional radiation  6. DVT proph: Neptali Hernandez MD       This note is created with the assistance of a speech recognition program.  While intending to generate a document that actually reflects the content of the visit, the document can still have some errors including those of syntax and sound a like substitutions which may escape proof reading.   In such instances, actual meaning can be extrapolated by contextual diversion

## 2020-12-17 NOTE — PROGRESS NOTES
[facility] [place] ([facility]) Neurology Specialist  [address]  [facility], [address] [facility]:  [phone] or [phone]  FAX:  [phone]            Brief history: Dalila Morales is a 66 y.o. old male admitted on 12/9/2020 with left temporal GBM       Subjective: No new neurological events overnight. The patient denies any weakness numbness or tingling     Objective: /66   Pulse 68   Temp 97 °F (36.1 °C) (Oral)   Resp 21   Ht 6' 2\" (1.88 m)   Wt 195 lb (88.5 kg) Comment: with two pillows, blanket  SpO2 97%   BMI 25.04 kg/m²       Medications:    levETIRAcetam  500 mg Oral BID    [START ON 12/18/2020] dexamethasone  4 mg Oral Daily    carvedilol  3.125 mg Oral BID WC    sodium chloride flush  10 mL Intravenous 2 times per day    sennosides-docusate sodium  1 tablet Oral BID    insulin lispro  0-6 Units Subcutaneous TID WC    enoxaparin  30 mg Subcutaneous BID    [Held by provider] potassium bicarb-citric acid  40 mEq Oral Daily    famotidine (PEPCID) injection  20 mg Intravenous BID    atorvastatin  40 mg Oral Nightly        Neurological examination:    Mental status   Expressive aphasia.   Able to follow one-step commands     Cranial nerves   II - visual fields intact to confrontation                                                III, IV, VI  extra-ocular muscles full: no pupillary defect; no NEW, no nystagmus, no ptosis                                                                      V - normal facial sensation                                                               VII -right lower facial droop                                                         VIII - intact hearing                                                                             IX, X - symmetrical palate                                                                  XI - symmetrical shoulder shrug XII - midline tongue without atrophy or fasciculation     Motor function   grossly intact     Sensory function  unable to obtain due to patient condition     Cerebellar No involuntary movements or tremors     Reflex function Intact 2+ DTR and symmetric. Negative Babinski     Gait                  Not tested         Lab Results   Component Value Date    LDLCHOLESTEROL 52 12/09/2020     No components found for: CHLPL  No results found for: TRIG  Lab Results   Component Value Date    HDL 37 (L) 12/09/2020     No results found for: LDLCALC  No results found for: LABVLDL  Lab Results   Component Value Date    LABA1C 5.5 12/10/2020     Lab Results   Component Value Date     12/10/2020     Lab Results   Component Value Date    [id number] 7145 12/09/2020      Neurological work up:  CT head  CTA head and neck  MRI brain     2 D echo     Assessment and Recommendations:   Left temporal GBM, confirmed on biopsy status post craniotomy    The patient continues to have expressive aphasia. Some of the symptoms of aphasia will persist due to location of the GBM  No family at bedside. Yesterday discussed with daughter who wanted to pursue palliative care  Continued Keppra 500 mg twice daily  The patient is medically stable to be discharged. Acute rehab has accepted the patient. Await family decision about possible palliative therapy    Sheldon Pardo MD  Neurology    This note is created with the assistance of a speech-recognition program. While intending to generate a document that actually reflects the content of the visit, the document can still have some errors including those of syntax and sound a- like substitutions which may escape proofreading. In such instances, actual meaning can be extrapolated by contextual derivation.

## 2020-12-17 NOTE — PROGRESS NOTES
Neurosurgery SUMAYA/Resident    Daily Progress Note   Chief Complaint   Patient presents with   Sarah Ann Sicard Cerebrovascular Accident     12/17/2020  12:57 PM    Chart reviewed. No acute events overnight. No new complaints. Denies headache. Vitals:    12/17/20 0130 12/17/20 0436 12/17/20 0800 12/17/20 0808   BP: 139/78 134/82  138/78   Pulse: 63 54 62 58   Resp: 17 16 17   Temp: 98.6 °F (37 °C) 98.7 °F (37.1 °C)  98.3 °F (36.8 °C)   TempSrc: Oral Oral  Oral   SpO2: 98% 93%  95%   Weight:       Height:           PE:   Alert, oriented to self, difficult to understand at times  Expressive aphasia, follows all commands  Motor   No pronator drift  L deltoid 5/5; R deltoid 5/5  L biceps 5/5; R biceps 5/5  L triceps 5/5; R triceps 5/5  L intrinsics 5/5; R intrinsics 5/5      L iliopsoas 5/5 , R iliopsoas 5/5  L quadriceps 5/5; R quadriceps 5/5  L Dorsiflexion 5/5; R dorsiflexion 5/5  L Plantarflexion 5/5; R plantarflexion 5/5    Incision left cranial site open to air      Lab Results   Component Value Date    WBC 6.7 12/17/2020    HGB 15.2 12/17/2020    HCT 43.9 12/17/2020    PLT See Reflexed IPF Result 12/17/2020    HDL 37 (L) 12/09/2020    ALT 28 12/09/2020    AST 23 12/09/2020     12/17/2020    K 4.4 12/17/2020     12/17/2020    CREATININE 0.77 12/17/2020    BUN 28 (H) 12/17/2020    CO2 22 12/17/2020    TSH 2.76 12/09/2020    INR 1.0 12/09/2020    LABA1C 5.5 12/10/2020   12/16/2020  9:48 AM - Rasheed, M[facility] Incoming Lab Results From [facility]    Component Collected Lab   Surgical Pathology Report 12/14/2020 11:01  [address]   -- Diagnosis --   1-5.  LEFT TEMPORAL LOBE BRAIN MASS, BIOPSIES AND RESECTION:      -   GLIOBLASTOMA, WHO GRADE 4.             A/P  66 y.o. male who presents with left temporal brain mass  POD#3 s/p awake craniotomy with speech mapping and 5 ALA guided resection of left temporal tumor      - PT and OT for mobilization   - wean decadron over 1 week - Celia Bloch to be discharged from a neurosurgery standpoint   - continue keppra for at least 6 months   - hem/onc following patient   - PMR following for possible discharge in IP rehab  - surgical pathology confirming grade 4 glioblastoma       Please contact neurosurgery with any changes in patients neurologic status.        Bc Edouard CNP  12/17/20  12:57 PM

## 2020-12-17 NOTE — PROGRESS NOTES
Respiratory: negative for cough , sputum, dyspnea, wheezing, hemoptysis, chest pain   Cardiovascular: negative for chest pain, dyspnea,  Gastrointestinal: negative for nausea, vomiting, diarrhea, constipation, abdominal pain, dysphagia  Genitourinary: negative for frequency, dysuria, nocturia, urinary incontinence, and hematuria   Integument: negative for rash   Hematologic/Lymphatic: negative for easy bruising, bleeding  Musculoskeletal: negative for myalgias, arthralgias, pain  Neurological: Positive for weakness, negative for headaches, dizziness    PHYSICAL EXAM:      /78   Pulse 58   Temp 98.3 °F (36.8 °C) (Oral)   Resp 17   Ht 6' 2\" (1.88 m)   Wt 195 lb (88.5 kg) Comment: with two pillows, blanket  SpO2 95%   BMI 25.04 kg/m²    Temp (24hrs), Av.3 °F (36.8 °C), Min:97.2 °F (36.2 °C), Max:98.7 °F (37.1 °C)    General appearance - well appearing, not in pain or distress   Mental status - alert and cooperative   Eyes - pupils equal and reactive, extraocular eye movements intact   Ears -not examined  Mouth - mucous membranes moist, pharynx normal without lesions   Neck - supple, no significant adenopathy   Lymphatics - no palpable lymphadenopathy, no hepatosplenomegaly   Chest - clear to auscultation, no wheezes, rales or rhonchi, symmetric air entry   Heart - normal rate, regular rhythm, normal S1, S2, no murmurs  Abdomen - soft, nontender, nondistended, no masses or organomegaly   Neurological - alert, not oriented, distorted speech  Musculoskeletal - no joint tenderness, deformity or swelling   Extremities - peripheral pulses normal, no pedal edema, no clubbing or cyanosis   Skin - normal coloration and turgor, no rashes, no suspicious skin lesions noted ,    DATA:    Labs:   CBC:   Recent Labs     205 20  0711   WBC 8.8 6.7   HGB 14.2 15.2   HCT 43.4 43.9   PLT See Reflexed IPF Result See Reflexed IPF Result     BMP:   Recent Labs     20  6781  137   K 4.0 4.4   CO2 20 22   BUN 27* 28*   CREATININE 0.79 0.77   LABGLOM >60 >60   GLUCOSE 130* 135*     PT/INR: No results for input(s): PROTIME, INR in the last 72 hours. IMAGING DATA:      Primary Problem  <principal problem not specified>    Active Hospital Problems    Diagnosis Date Noted    Thrombocytopenia ([facility] [address] [D69.6]     GBM (glioblastoma multiforme) (HCC) [C71.9]     Acute encephalopathy [G93.40]     Cerebral edema ([address] [G93.6]     Brain mass [G93.89] 12/09/2020         IMPRESSION:   1. Glioblastoma multiforme. High-grade. 2. Cerebral edema. 3. Essential hypertension. 4. Mild thrombocytopenia. RECOMMENDATIONS:  Records, labs and imaging reviewed. Family to decide on course of action   Pt is aware of diagnosis. But unable to communicate plan  Will reach out to family to get their input. Likely to go to palliative care        Akil Links MD Jose G  Hematologist/Medical [id number] [facility] hematology oncology physicians                  This note is created with the assistance of a speech recognition program.  While intending to generate a document that actually reflects the content of the visit, the document can still have some errors including those of syntax and sound a like substitutions which may escape proof reading. It such instances, actual meaning can be extrapolated by contextual diversion.

## 2020-12-18 LAB
ANION GAP SERPL CALCULATED.3IONS-SCNC: 8 MMOL/L (ref 9–17)
BUN BLDV-MCNC: 31 MG/DL (ref 8–23)
BUN/CREAT BLD: ABNORMAL (ref 9–20)
CALCIUM IONIZED: 1.05 MMOL/L (ref 1.13–1.33)
CALCIUM SERPL-MCNC: 7.9 MG/DL (ref 8.6–10.4)
CHLORIDE BLD-SCNC: 104 MMOL/L (ref 98–107)
CO2: 24 MMOL/L (ref 20–31)
CREAT SERPL-MCNC: 0.9 MG/DL (ref 0.7–1.2)
ESTIMATED AVERAGE GLUCOSE: 111 MG/DL
GFR AFRICAN AMERICAN: >60 ML/MIN
GFR NON-AFRICAN AMERICAN: >60 ML/MIN
GFR SERPL CREATININE-BSD FRML MDRD: ABNORMAL ML/MIN/{1.73_M2}
GFR SERPL CREATININE-BSD FRML MDRD: ABNORMAL ML/MIN/{1.73_M2}
GLUCOSE BLD-MCNC: 102 MG/DL (ref 70–99)
GLUCOSE BLD-MCNC: 114 MG/DL (ref 75–110)
GLUCOSE BLD-MCNC: 120 MG/DL (ref 75–110)
GLUCOSE BLD-MCNC: 132 MG/DL (ref 75–110)
GLUCOSE BLD-MCNC: 161 MG/DL (ref 75–110)
HBA1C MFR BLD: 5.5 % (ref 4–6)
HCT VFR BLD CALC: 43.7 % (ref 40.7–50.3)
HEMOGLOBIN: 14.1 G/DL (ref 13–17)
MCH RBC QN AUTO: 31.3 PG (ref 25.2–33.5)
MCHC RBC AUTO-ENTMCNC: 32.3 G/DL (ref 28.4–34.8)
MCV RBC AUTO: 96.9 FL (ref 82.6–102.9)
NRBC AUTOMATED: 0 PER 100 WBC
PDW BLD-RTO: 13.1 % (ref 11.8–14.4)
PHOSPHORUS: 3 MG/DL (ref 2.5–4.5)
PLATELET # BLD: NORMAL K/UL (ref 138–453)
PLATELET, FLUORESCENCE: 137 K/UL (ref 138–453)
PLATELET, IMMATURE FRACTION: 2.2 % (ref 1.1–10.3)
PMV BLD AUTO: NORMAL FL (ref 8.1–13.5)
POTASSIUM SERPL-SCNC: 3.8 MMOL/L (ref 3.7–5.3)
RBC # BLD: 4.51 M/UL (ref 4.21–5.77)
SODIUM BLD-SCNC: 136 MMOL/L (ref 135–144)
WBC # BLD: 6.3 K/UL (ref 3.5–11.3)

## 2020-12-18 PROCEDURE — 80048 BASIC METABOLIC PNL TOTAL CA: CPT

## 2020-12-18 PROCEDURE — 92507 TX SP LANG VOICE COMM INDIV: CPT

## 2020-12-18 PROCEDURE — 36415 COLL VENOUS BLD VENIPUNCTURE: CPT

## 2020-12-18 PROCEDURE — 99232 SBSQ HOSP IP/OBS MODERATE 35: CPT | Performed by: INTERNAL MEDICINE

## 2020-12-18 PROCEDURE — 85027 COMPLETE CBC AUTOMATED: CPT

## 2020-12-18 PROCEDURE — 97110 THERAPEUTIC EXERCISES: CPT

## 2020-12-18 PROCEDURE — 99232 SBSQ HOSP IP/OBS MODERATE 35: CPT | Performed by: PHYSICAL MEDICINE & REHABILITATION

## 2020-12-18 PROCEDURE — 82330 ASSAY OF CALCIUM: CPT

## 2020-12-18 PROCEDURE — 2060000000 HC ICU INTERMEDIATE R&B

## 2020-12-18 PROCEDURE — 6360000002 HC RX W HCPCS: Performed by: NURSE PRACTITIONER

## 2020-12-18 PROCEDURE — 6370000000 HC RX 637 (ALT 250 FOR IP): Performed by: NURSE PRACTITIONER

## 2020-12-18 PROCEDURE — 6360000002 HC RX W HCPCS: Performed by: STUDENT IN AN ORGANIZED HEALTH CARE EDUCATION/TRAINING PROGRAM

## 2020-12-18 PROCEDURE — 94761 N-INVAS EAR/PLS OXIMETRY MLT: CPT

## 2020-12-18 PROCEDURE — 2500000003 HC RX 250 WO HCPCS: Performed by: PHYSICIAN ASSISTANT

## 2020-12-18 PROCEDURE — 6370000000 HC RX 637 (ALT 250 FOR IP): Performed by: PHYSICIAN ASSISTANT

## 2020-12-18 PROCEDURE — 2580000003 HC RX 258: Performed by: PHYSICIAN ASSISTANT

## 2020-12-18 PROCEDURE — APPSS45 APP SPLIT SHARED TIME 31-45 MINUTES: Performed by: REGISTERED NURSE

## 2020-12-18 PROCEDURE — 84100 ASSAY OF PHOSPHORUS: CPT

## 2020-12-18 PROCEDURE — 85055 RETICULATED PLATELET ASSAY: CPT

## 2020-12-18 PROCEDURE — 82947 ASSAY GLUCOSE BLOOD QUANT: CPT

## 2020-12-18 PROCEDURE — 83036 HEMOGLOBIN GLYCOSYLATED A1C: CPT

## 2020-12-18 PROCEDURE — 97116 GAIT TRAINING THERAPY: CPT

## 2020-12-18 PROCEDURE — 99232 SBSQ HOSP IP/OBS MODERATE 35: CPT | Performed by: PSYCHIATRY & NEUROLOGY

## 2020-12-18 RX ADMIN — ENOXAPARIN SODIUM 30 MG: 100 INJECTION SUBCUTANEOUS at 08:31

## 2020-12-18 RX ADMIN — SODIUM CHLORIDE, PRESERVATIVE FREE 10 ML: 5 INJECTION INTRAVENOUS at 20:03

## 2020-12-18 RX ADMIN — STANDARDIZED SENNA CONCENTRATE AND DOCUSATE SODIUM 1 TABLET: 8.6; 5 TABLET ORAL at 20:02

## 2020-12-18 RX ADMIN — LEVETIRACETAM 500 MG: 500 TABLET, FILM COATED ORAL at 20:02

## 2020-12-18 RX ADMIN — FAMOTIDINE 20 MG: 10 INJECTION INTRAVENOUS at 08:31

## 2020-12-18 RX ADMIN — ENOXAPARIN SODIUM 30 MG: 100 INJECTION SUBCUTANEOUS at 20:03

## 2020-12-18 RX ADMIN — DESMOPRESSIN ACETATE 40 MG: 0.2 TABLET ORAL at 20:02

## 2020-12-18 RX ADMIN — DEXAMETHASONE 4 MG: 4 TABLET ORAL at 08:31

## 2020-12-18 RX ADMIN — FAMOTIDINE 20 MG: 10 INJECTION INTRAVENOUS at 20:03

## 2020-12-18 RX ADMIN — STANDARDIZED SENNA CONCENTRATE AND DOCUSATE SODIUM 1 TABLET: 8.6; 5 TABLET ORAL at 08:31

## 2020-12-18 RX ADMIN — CARVEDILOL 3.12 MG: 3.12 TABLET, FILM COATED ORAL at 16:57

## 2020-12-18 RX ADMIN — LEVETIRACETAM 500 MG: 500 TABLET, FILM COATED ORAL at 08:31

## 2020-12-18 RX ADMIN — CARVEDILOL 3.12 MG: 3.12 TABLET, FILM COATED ORAL at 08:31

## 2020-12-18 RX ADMIN — SODIUM CHLORIDE, PRESERVATIVE FREE 10 ML: 5 INJECTION INTRAVENOUS at 08:31

## 2020-12-18 ASSESSMENT — PAIN SCALES - GENERAL: PAINLEVEL_OUTOF10: 0

## 2020-12-18 NOTE — PROGRESS NOTES
Speech Language Pathology  Speech Language Pathology  [facility]    Speech Language Treatment Note    Date: 12/18/2020  Patients Name: Venancio Harris  MRN: [id number]  Diagnosis:   Patient Active Problem List   Diagnosis Code    Brain mass G93.89    Acute encephalopathy G93.40    Cerebral edema (HCC) G93.6    Thrombocytopenia (HCC) D69.6    GBM (glioblastoma multiforme) ([facility] [address] C71.9       Pain: 0/10    Speech and Language Treatment  Treatment time: 110-530    Subjective: [x] Alert [x] Cooperative     [] Confused     [] Agitated    [] Lethargic    Objective/Assessment:  Auditory Comprehension:   1-step commands: 5/5 independently (pt required occasional repetitions)  2-step commands: 1/4 increased to 4/4 with min verbal and visual cues and repetition of instruction. Verbal Expression:   Object ID: 2/6 increased to 6/6 with max verbal cues   [address] Questions: 2/6, increased to 4/6 despite max verbal and redirection  Biographical information: Pt oriented to first and last name independently, oriented to age and year with forced choice prompts given, not oriented to hospital    Pt. With appropriate simple conversational responses with paraphasic errors noted at times. Often states he cannot think of the word. Education provided re: circumlocution. Decreased comprehension noted. Other: Pt pleasant and cooperative throughout session and expressed motivation to improve speech and language skills. Plan:  [x] Continue ST services    [] Discharge from ST:      Discharge recommendations: Further therapy recommended at discharge. Treatment completed by:  Hemant Silver M.S. CF-SLP

## 2020-12-18 NOTE — PROGRESS NOTES
Physical Therapy  Facility/Department: [facility] NEURO  Daily Treatment Note  NAME: Reees Borges  : 1945  MRN: [id number]    Date of Service: 2020    Discharge Recommendations:  Patient would benefit from continued therapy after discharge        Assessment   Body structures, Functions, Activity limitations: Decreased functional mobility ; Decreased posture;Decreased endurance;Decreased ROM; Decreased strength;Decreased safe awareness;Decreased balance  Assessment: Pt ambulated ~30ft with RW, CGA. Pt requires increased time to complete all functional mobility. Pt limited by general weakness and SOB. Would benefit from continued PT to address deficits. Prognosis: Good  Decision Making: Medium Complexity  PT Education: Goals;PT Role;Plan of Care;General Safety;Gait Training;Transfer Training;Home Exercise Program;Adaptive Device Training; Injury Prevention;Pressure Relief; Functional Mobility Training;Orientation  Patient Education: safe use of RW  REQUIRES PT FOLLOW UP: Yes  Activity Tolerance  Activity Tolerance: Patient limited by fatigue;Patient limited by endurance; Patient limited by cognitive status     Patient Diagnosis(es): The primary encounter diagnosis was Brain mass. A diagnosis of [place] coma scale total score 3-8, in the field (EMT or ambulance) [facility]) was also pertinent to this visit. has no past medical history on file. has a past surgical history that includes craniotomy (Left, 2020) and craniotomy (Left, 2020). Restrictions  Restrictions/Precautions  Restrictions/Precautions: Fall Risk  Required Braces or Orthoses?: No  Position Activity Restriction  Other position/activity restrictions: Up with assist  Subjective   General  Chart Reviewed: Yes  Response To Previous Treatment: Patient with no complaints from previous session.   Family / Caregiver Present: No  Subjective Current Treatment Recommendations: Strengthening, Transfer Training, Endurance Training, Patient/Caregiver Education & Training, ROM, Balance Training, Gait Training, Home Exercise Program, Functional Mobility Training, Stair training, Safety Education & Training  Safety Devices  Type of devices: Gait belt, Nurse notified, Call light within reach, Patient at risk for falls, All fall risk precautions in place  Restraints  Initially in place: No     Therapy Time   Individual Concurrent Group Co-treatment   Time In 1059         Time Out 1122         Minutes 23         Timed Code Treatment Minutes: [address] [facility], \[facility] [place] [facility]\""

## 2020-12-18 NOTE — PROGRESS NOTES
Assumed care of patient and received nursing report at bedside. Patient is awake sitting up in bed and presents with no s/s of distress. Patient is on room air with normal respirations and is sinus chuck on the tele monitor. No further request made known at this time. Bed in l/l position with alarm on and call light within reach.

## 2020-12-18 NOTE — PROGRESS NOTES
[address] [facility] Neurology   [address] [place] [address]    Progress note             Date:   12/18/2020  Patient name:  Lola Underwood  Date of admission:  12/9/2020  1:32 PM  MRN:   [id number]  Account:  [de-identified]  YOB: 1945  PCP:    No primary care provider on file. Room:   [address] [place] [address]  Code Status:    Full Code    Chief Complaint:     Chief Complaint   Patient presents with    Cerebrovascular Accident   Altered mental status    Interval hx:     No acute events overnight, patient speech appears significantly improved, although he has still periods of dysarthria/expressive aphasia, it progressively gotten better since yesterday. He is awake and tolerating p.o. and able to follow commands. Unclear what the decision in regards to inpatient hospice as patient is currently hemodynamically stable and is able to follow commands appropriately. Brief History of Present Illness:     15-year-old male who initially presented for altered mental status after nursing the Facebook posts were not making sense at home. Following day, the patient's mental status declined and he became less responsive. Brought to the hospital with an NIH stroke scale of 20, aphasia. Patient was found to have a left posterior temporal lobe mass with vasogenic edema. He underwent resection of underlying mass. This is consistent with GBM. Hematology was also consulted. Past Medical History:     History reviewed. No pertinent past medical history. Past Surgical History:     Past Surgical History:   Procedure Laterality Date    CRANIOTOMY Left 12/14/2020    temporal for tumor    CRANIOTOMY Left 12/14/2020    TEMPORAL CRANIOTOMY FOR TUMOR, IMAGE GUIDED, AWAKE performed by Jose Gomez DO at [place] [address]        Medications Prior to Admission:     Prior to Admission medications    Not on File        Allergies:     Patient has no known allergies.     Social History: Tobacco:    has no history on file for tobacco.  Alcohol:      has no history on file for alcohol. Drug Use:  has no history on file for drug. Family History:     History reviewed. No pertinent family history. Review of Systems:     ROS:  Constitutional  Negative for fever and chills    HEENT  Negative for ear discharge, ear pain, nosebleed    Eyes  Negative for photophobia, pain and discharge    Respiratory  Negative for hemoptysis and sputum    Cardiovascular  Negative for orthopnea, claudication and PND    Gastrointestinal  Negative for abdominal pain, diarrhea, blood in stool    Musculoskeletal  Negative for joint pain, negative for myalgia    Neurology Negative for seizures, loss of consciousness   Skin  Negative for rash or itching    Endo/heme/allergies  Negative for polydipsia, environmental allergy    Psychiatric/behavioral  Negative for suicidal ideation. Patient is not anxious        Physical Exam:   /67   Pulse 56   Temp 97.8 °F (36.6 °C) (Oral)   Resp 9   Ht 6' 2\" (1.88 m)   Wt 195 lb (88.5 kg) Comment: with two pillows, blanket  SpO2 93%   BMI 25.04 kg/m²   Temp (24hrs), Av.5 °F (36.4 °C), Min:96.8 °F (36 °C), Max:98.3 °F (36.8 °C)    Recent Labs     20  2148 20  0811 20  1237 20  1619   POCGLU 125* 120* 137* 139*       Intake/Output Summary (Last 24 hours) at 2020 0631  Last data filed at 2020 1696  Gross per 24 hour   Intake    Output 1700 ml   Net -1700 ml         NEUROLOGIC EXAMINATION  Awake, alert and able to follow commands. Speech. Dysarthric/expressive aphasia but coherent, some stuttering noted. Pupils equal and reactive, EOMI, no gaze deviation, no facial droop. Tongue midline and elevates uvula. Motor. Moving all extremities spontaneously and antigravity. Sensory. Withdraws all 4's extremities to noxious stimuli. Coordination deferred.   Gait deferred    Investigations:      Laboratory Testing: Recent Results (from the past 24 hour(s))   Basic Metabolic Panel    Collection Time: 12/17/20  7:11 AM   Result Value Ref Range    Glucose 135 (H) 70 - 99 mg/dL    BUN 28 (H) 8 - 23 mg/dL    CREATININE 0.77 0.70 - 1.20 mg/dL    Bun/Cre Ratio NOT REPORTED 9 - 20    Calcium 8.1 (L) 8.6 - 10.4 mg/dL    Sodium 137 135 - 144 mmol/L    Potassium 4.4 3.7 - 5.3 mmol/L    Chloride 104 98 - 107 mmol/L    CO2 22 20 - 31 mmol/L    Anion Gap 11 9 - 17 mmol/L    GFR Non-African American >60 >60 mL/min    GFR African American >60 >60 mL/min    GFR Comment          GFR Staging NOT REPORTED    CBC    Collection Time: 12/17/20  7:11 AM   Result Value Ref Range    WBC 6.7 3.5 - 11.3 k/uL    RBC 4.84 4.21 - 5.77 m/uL    Hemoglobin 15.2 13.0 - 17.0 g/dL    Hematocrit 43.9 40.7 - 50.3 %    MCV 90.7 82.6 - 102.9 fL    MCH 31.4 25.2 - 33.5 pg    MCHC 34.6 28.4 - 34.8 g/dL    RDW 13.1 11.8 - 14.4 %    Platelets See Reflexed IPF Result 138 - 453 k/uL    MPV NOT REPORTED 8.1 - 13.5 fL    NRBC Automated 0.0 0.0 per 100 WBC   PHOSPHORUS    Collection Time: 12/17/20  7:11 AM   Result Value Ref Range    Phosphorus 3.8 2.5 - 4.5 mg/dL   CALCIUM, IONIZED    Collection Time: 12/17/20  7:11 AM   Result Value Ref Range    Calcium, Ion 1.04 (L) 1.13 - 1.33 mmol/L   Immature Platelet Fraction    Collection Time: 12/17/20  7:11 AM   Result Value Ref Range    Platelet, Immature Fraction 2.5 1.1 - 10.3 %    Platelet, Fluorescence 119 (L) 138 - 453 k/uL   POC Glucose Fingerstick    Collection Time: 12/17/20  8:11 AM   Result Value Ref Range    POC Glucose 120 (H) 75 - 110 mg/dL   POC Glucose Fingerstick    Collection Time: 12/17/20 12:37 PM   Result Value Ref Range    POC Glucose 137 (H) 75 - 110 mg/dL   POC Glucose Fingerstick    Collection Time: 12/17/20  4:19 PM   Result Value Ref Range    POC Glucose 139 (H) 75 - 110 mg/dL   Basic Metabolic Panel    Collection Time: 12/18/20  4:40 AM   Result Value Ref Range    Glucose 102 (H) 70 - 99 mg/dL Patient speech/dysarthria is significantly improved, he is able to follow commands and is speaking coherently. He remains on Decadron and seizure prophylaxis. Awaiting family decision regarding possible candidate for inpatient hospice versus SNF as patient is hemodynamically stable. NeurologicalGBM s/p resection  Continue to monitor for any neurological changes. Continue seizure precautions as directed. Seizure prophylaxis with Keppra. Continue Decadron as per neurosurgery  Neurosurgery on board,follow-up appreciated  Monitor blood sugars as directed  f/up with family regarding possible inpatient hospice     PT/OT eval and treat  P.o. diet as tolerating. Continue to monitor blood sugars. DVT prophylaxis  GI prophylaxis              Follow-up further recommendations after discussing the case with attending  The plan was discussed with the patient, patient's family and the medical staff. Consultations:   IP CONSULT TO STROKE TEAM  IP CONSULT TO NEUROSURGERY  IP CONSULT TO NEUROCRITICAL CARE  IP CONSULT TO PHARMACY  PHARMACY TO CHANGE BASE FLUIDS  IP CONSULT TO PHYSICAL MEDICINE REHAB  IP CONSULT TO CARDIOLOGY  IP CONSULT TO PHYSICAL MEDICINE REHAB  IP CONSULT TO HEM/ONC  IP CONSULT TO HEM/ONC  IP CONSULT TO NEUROLOGY    Patient is admitted as inpatient status because of co-morbidities listed above, severity of signs and symptoms as outlined, requirement for current medical therapies and most importantly because of direct risk to patient if care not provided in a hospital setting. Marlene Suh MD  12/18/2020  6:31 AM    Copy sent to Dr. Gayle primary care provider on file.

## 2020-12-18 NOTE — PROGRESS NOTES
Neurosurgery [facility]/Resident    Daily Progress Note   Chief Complaint   Patient presents with   [facility] Cerebrovascular Accident     12/18/2020  2:34 PM    Chart reviewed. No acute events overnight. No new complaints. Denies headache. Vitals:    12/17/20 2341 12/18/20 0320 12/18/20 0818 12/18/20 1157   BP: (!) 125/57 118/67 98/80 123/64   Pulse: 58 56 66 60   Resp: 12 9 18 16   Temp: 97.8 °F (36.6 °C) 97.8 °F (36.6 °C) 97.4 °F (36.3 °C) 97.5 °F (36.4 °C)   TempSrc: Oral Oral Oral Oral   SpO2: 92% 93% 96% 95%   Weight:       Height:         PE:   Alert, oriented to self, difficult to understand at times  Expressive aphasia, follows all commands  Motor   No pronator drift  L deltoid 5/5; R deltoid 5/5  L biceps 5/5; R biceps 5/5  L triceps 5/5; R triceps 5/5  L intrinsics 5/5; R intrinsics 5/5      L iliopsoas 5/5 , R iliopsoas 5/5  L quadriceps 5/5; R quadriceps 5/5  L Dorsiflexion 5/5; R dorsiflexion 5/5  L Plantarflexion 5/5; R plantarflexion 5/5     Incision left cranial site open to air      Lab Results   Component Value Date    WBC 6.3 12/18/2020    HGB 14.1 12/18/2020    HCT 43.7 12/18/2020    PLT See Reflexed IPF Result 12/18/2020    HDL 37 (L) 12/09/2020    ALT 28 12/09/2020    AST 23 12/09/2020     12/18/2020    K 3.8 12/18/2020     12/18/2020    CREATININE 0.90 12/18/2020    BUN 31 (H) 12/18/2020    CO2 24 12/18/2020    TSH 2.76 12/09/2020    INR 1.0 12/09/2020    LABA1C 5.5 12/10/2020       A/P  66 y.o. male who presents with left temporal brain mass  POD#4 s/p awake craniotomy with speech mapping and 5 ALA guided resection of left temporal tumor      - PT and OT for mobilization              - wean decadron over 1 week  - continue keppra for at least 6 months   - hem/onc following patient   - PMR following for possible discharge in IP rehab  - discharging planning- hospice vs IP rehab      Please contact neurosurgery with any changes in patients neurologic status.        Digna Nation, CNP 12/18/20  2:34 PM

## 2020-12-18 NOTE — PROGRESS NOTES
Physical Medicine & Rehabilitation  Progress Note    12/18/2020 12:32 PM     CC: Ambulatory and ADL dysfunction due to change in mental status and weaknessglioblastoma status post resection    Brief history  7year-old male admitted 12/9/2020 found to have grade 4 glioblastoma status post resection 12/14/2020. He is weaning off Decadron and is on seizure prophylaxis    Hematology/oncologyplan for chemoradiation, family to decide on active treatment or palliative care    Neurosurgeryokay to resume Lovenox surgical pathology grade 4 glioblastoma, wean Decadron over 1 week keep Keppra for 6 months    Neuro critical Decadron 4 mg twice daily x2 days then 4 mg dailyclarify taper on Lovenox       Noted referred to inpatient hospice, daughter has Covid    Subjective:   No complaints. Feels well    ROS:  Denies fevers, chills, sweats. No chest pain, palpitations, lightheadedness. Denies coughing, wheezing or shortness of breath. Denies abdominal pain, nausea, diarrhea or constipation. No new areas of joint pain. Denies new areas of numbness or weakness. Denies new anxiety or depression issues. No new skin problems. Rehabilitation:   PT:  Restrictions/Precautions: Fall Risk  Other position/activity restrictions: Up with assist   Transfers  Sit to Stand: Minimal Assistance  Stand to sit: Minimal Assistance  Bed to Chair: Minimal assistance, 2 Person Assistance  Comment: used RW  Ambulation 1  Surface: level tile  Device: Rolling Walker  Other Apparatus: Wheelchair follow  Assistance: Contact guard assistance  Quality of Gait: one incident of LOB noted. pt was able to recover requiring Vj  Gait Deviations: Slow Juliet, Decreased step length, Decreased step height  Distance: 30ft  Comments: increased time and effort to complete due to SOB and increased fatigue. cueing for sequencing t/o fair return.           OT:  ADL  Feeding: Supervision  Grooming: Stand by assistance, Setup, Verbal cueing UE Bathing: Minimal assistance, Increased time to complete, Contact guard assistance  LE Bathing: Maximum assistance, Increased time to complete  UE Dressing: Minimal assistance, Increased time to complete  LE Dressing: Setup, Verbal cueing, Increased time to complete, Maximum assistance  Toileting: Increased time to complete, Moderate assistance  Additional Comments: Per RN pt. not to have lights on in room untill later this date for precautions from procceger. ADL/Grooming activity sitting EOB 35 minutes. Grooming activity SBA+set up and VCs for sequensing d/t  vision/lighting in room. UB bathe min A to dry L UE, per pt. \"I just cant see what I'm doing\". LB bathe max A to bathe below B knees d/t decreasd flexability and fatigue. LB dress max A to don/doff B socks, UB dress min A -CGA d/t difficulty with lines and decreased vision d/t environment. Balance  Sitting Balance: Stand by assistance  Standing Balance: Contact guard assistance   Standing Balance  Time: Sitting 35 minutes, standing- CGA-min A  Activity: Dynamic sitting- ADL/grooming activity, Dynamic stand- Min A-CGA side step to [facility]  Comment: utilizing RW  Functional Mobility  Functional - Mobility Device: Rolling Walker  Activity: To/from bathroom  Assist Level: Minimal assistance  Functional Mobility Comments: side steps to [facility]     Bed mobility  Supine to Sit: Minimal assistance  Sit to Supine: Minimal assistance  Scooting: Stand by assistance  Comment: increased time and effort to complete.   Transfers  Stand Step Transfers: Minimal assistance  Sit to stand: Minimal assistance  Stand to sit: Minimal assistance  Transfer Comments: utilizing RW   Toilet Transfers  Toilet - Technique: Ambulating  Equipment Used: Standard toilet  Toilet Transfer: Minimal assistance  Toilet Transfers Comments: utilizing grab bar for sit><stand from low toilet BSC placed over toilet to increase height in attempt to increase pt independence               ST:  Auditory Comprehension:   1-step commands: 5/5 independently (pt required occasional repetitions)  2-step commands: 1/4 increased to 4/4 with min verbal and visual cues and repetition of instruction.     Verbal Expression:   Object ID:  increased to 6/6 with max verbal cues   [facility] Questions: 2/6, increased to 4/6 despite max verbal and redirection  Biographical information: Pt oriented to first and last name independently, oriented to age and year with forced choice prompts given, not oriented to hospital     Pt. With appropriate simple conversational responses with paraphasic errors noted at times. Often states he cannot think of the word. Education provided re: circumlocution. Decreased comprehension noted. Objective:  /64   Pulse 60   Temp 97.5 °F (36.4 °C) (Oral)   Resp 16   Ht 6' 2\" (1.88 m)   Wt 195 lb (88.5 kg) Comment: with two pillows, blanket  SpO2 95%   BMI 25.04 kg/m²  I Body mass index is 25.04 kg/m². I   Wt Readings from Last 1 Encounters:   20 195 lb (88.5 kg)      Temp (24hrs), Av.4 °F (36.3 °C), Min:96.8 °F (36 °C), Max:97.8 °F (36.6 °C)         GEN: well developed, well nourished, no acute distress  HEENT: Normocephalic atraumatic, EOMI, mucous membranes pink and moist scalp incision clean, decreased hearing  CV: RRR, no murmurs, rubs or gallops  PULM: CTAB, no rales or rhonchi. Respirations WNL and unlabored  ABD: soft, NT, ND, +BS and equal  NEURO: A&O x3. Sensation intact to light touch. MSK: 4-5 upper extremities 3-4/5 lower extremities  EXTREMITIES: No calf tenderness to palpation bilaterally. No edema BLEs  SKIN: warm dry and intact with good turgor  PSYCH: appropriately interactive. Affect WNL.           Medications   Scheduled Meds:   levETIRAcetam  500 mg Oral BID    dexamethasone  4 mg Oral Daily    carvedilol  3.125 mg Oral BID WC    sodium chloride flush  10 mL Intravenous 2 times per day    sennosides-docusate sodium  1 tablet Oral BID  insulin lispro  0-6 Units Subcutaneous TID     enoxaparin  30 mg Subcutaneous BID    [Held by provider] potassium bicarb-citric acid  40 mEq Oral Daily    famotidine (PEPCID) injection  20 mg Intravenous BID    atorvastatin  40 mg Oral Nightly     Continuous Infusions:   dextrose       PRN Meds:.labetalol, sodium chloride flush, sodium chloride flush, acetaminophen, oxyCODONE **OR** oxyCODONE, morphine **OR** morphine, magnesium hydroxide, bisacodyl, promethazine **OR** ondansetron, glucose, dextrose, glucagon (rDNA), dextrose, sodium chloride flush     Diagnostics:     CBC:   Recent Labs     12/16/20  0445 12/17/20  0711 12/18/20  0440   WBC 8.8 6.7 6.3   RBC 4.59 4.84 4.51   HGB 14.2 15.2 14.1   HCT 43.4 43.9 43.7   MCV 94.6 90.7 96.9   RDW 13.1 13.1 13.1   PLT See Reflexed IPF Result See Reflexed IPF Result See Reflexed IPF Result     BMP:   Recent Labs     12/16/20  0445 12/17/20  0711 12/18/20  0440    137 136   K 4.0 4.4 3.8    104 104   CO2 20 22 24   PHOS 3.0 3.8 3.0   BUN 27* 28* 31*   CREATININE 0.79 0.77 0.90     BNP: No results for input(s): BNP in the last 72 hours. PT/INR: No results for input(s): PROTIME, INR in the last 72 hours. APTT: No results for input(s): APTT in the last 72 hours. CARDIAC ENZYMES: No results for input(s): CKMB, CKMBINDEX, TROPONINT in the last 72 hours. Invalid input(s): CKTOTAL;3  FASTING LIPID PANEL:  Lab Results   Component Value Date    HDL 37 (L) 12/09/2020     LIVER PROFILE: No results for input(s): AST, ALT, ALB, BILIDIR, BILITOT, ALKPHOS in the last 72 hours. I/O (24Hr):     Intake/Output Summary (Last 24 hours) at 12/18/2020 1232  Last data filed at 12/18/2020 0611  Gross per 24 hour   Intake    Output 700 ml   Net -700 ml       Glu last 24 hour  Recent Labs     12/17/20  1237 12/17/20  1619 12/17/20 2028 12/18/20  1133   POCGLU 137* 139* 161* 114*

## 2020-12-19 LAB
ALBUMIN SERPL-MCNC: 3.2 G/DL (ref 3.5–5.2)
ALBUMIN/GLOBULIN RATIO: 1.3 (ref 1–2.5)
ALP BLD-CCNC: 50 U/L (ref 40–129)
ALT SERPL-CCNC: 88 U/L (ref 5–41)
ANION GAP SERPL CALCULATED.3IONS-SCNC: 9 MMOL/L (ref 9–17)
AST SERPL-CCNC: 25 U/L
BILIRUB SERPL-MCNC: 0.53 MG/DL (ref 0.3–1.2)
BILIRUBIN DIRECT: 0.15 MG/DL
BILIRUBIN, INDIRECT: 0.38 MG/DL (ref 0–1)
BUN BLDV-MCNC: 24 MG/DL (ref 8–23)
BUN/CREAT BLD: ABNORMAL (ref 9–20)
CALCIUM IONIZED: 1.16 MMOL/L (ref 1.13–1.33)
CALCIUM SERPL-MCNC: 7.9 MG/DL (ref 8.6–10.4)
CHLORIDE BLD-SCNC: 104 MMOL/L (ref 98–107)
CO2: 21 MMOL/L (ref 20–31)
CREAT SERPL-MCNC: 0.75 MG/DL (ref 0.7–1.2)
GFR AFRICAN AMERICAN: >60 ML/MIN
GFR NON-AFRICAN AMERICAN: >60 ML/MIN
GFR SERPL CREATININE-BSD FRML MDRD: ABNORMAL ML/MIN/{1.73_M2}
GFR SERPL CREATININE-BSD FRML MDRD: ABNORMAL ML/MIN/{1.73_M2}
GLOBULIN: ABNORMAL G/DL (ref 1.5–3.8)
GLUCOSE BLD-MCNC: 101 MG/DL (ref 70–99)
GLUCOSE BLD-MCNC: 167 MG/DL (ref 75–110)
GLUCOSE BLD-MCNC: 93 MG/DL (ref 75–110)
HCT VFR BLD CALC: 44.3 % (ref 40.7–50.3)
HEMOGLOBIN: 14.9 G/DL (ref 13–17)
MCH RBC QN AUTO: 31.9 PG (ref 25.2–33.5)
MCHC RBC AUTO-ENTMCNC: 33.6 G/DL (ref 28.4–34.8)
MCV RBC AUTO: 94.9 FL (ref 82.6–102.9)
NRBC AUTOMATED: 0 PER 100 WBC
PDW BLD-RTO: 13 % (ref 11.8–14.4)
PHOSPHORUS: 3 MG/DL (ref 2.5–4.5)
PLATELET # BLD: 155 K/UL (ref 138–453)
PMV BLD AUTO: 10.3 FL (ref 8.1–13.5)
POTASSIUM SERPL-SCNC: 4.2 MMOL/L (ref 3.7–5.3)
RBC # BLD: 4.67 M/UL (ref 4.21–5.77)
SODIUM BLD-SCNC: 134 MMOL/L (ref 135–144)
TOTAL PROTEIN: 5.7 G/DL (ref 6.4–8.3)
WBC # BLD: 7.6 K/UL (ref 3.5–11.3)

## 2020-12-19 PROCEDURE — 6370000000 HC RX 637 (ALT 250 FOR IP): Performed by: PHYSICIAN ASSISTANT

## 2020-12-19 PROCEDURE — 85027 COMPLETE CBC AUTOMATED: CPT

## 2020-12-19 PROCEDURE — 6370000000 HC RX 637 (ALT 250 FOR IP): Performed by: PSYCHIATRY & NEUROLOGY

## 2020-12-19 PROCEDURE — 82330 ASSAY OF CALCIUM: CPT

## 2020-12-19 PROCEDURE — 36415 COLL VENOUS BLD VENIPUNCTURE: CPT

## 2020-12-19 PROCEDURE — 80048 BASIC METABOLIC PNL TOTAL CA: CPT

## 2020-12-19 PROCEDURE — 80076 HEPATIC FUNCTION PANEL: CPT

## 2020-12-19 PROCEDURE — 6360000002 HC RX W HCPCS: Performed by: NURSE PRACTITIONER

## 2020-12-19 PROCEDURE — 84100 ASSAY OF PHOSPHORUS: CPT

## 2020-12-19 PROCEDURE — 2060000000 HC ICU INTERMEDIATE R&B

## 2020-12-19 PROCEDURE — 82947 ASSAY GLUCOSE BLOOD QUANT: CPT

## 2020-12-19 PROCEDURE — 6370000000 HC RX 637 (ALT 250 FOR IP): Performed by: NURSE PRACTITIONER

## 2020-12-19 PROCEDURE — 97116 GAIT TRAINING THERAPY: CPT

## 2020-12-19 PROCEDURE — 6360000002 HC RX W HCPCS: Performed by: STUDENT IN AN ORGANIZED HEALTH CARE EDUCATION/TRAINING PROGRAM

## 2020-12-19 PROCEDURE — APPSS15 APP SPLIT SHARED TIME 0-15 MINUTES: Performed by: REGISTERED NURSE

## 2020-12-19 PROCEDURE — 97110 THERAPEUTIC EXERCISES: CPT

## 2020-12-19 PROCEDURE — 6370000000 HC RX 637 (ALT 250 FOR IP): Performed by: REGISTERED NURSE

## 2020-12-19 PROCEDURE — 2580000003 HC RX 258: Performed by: PHYSICIAN ASSISTANT

## 2020-12-19 PROCEDURE — 94761 N-INVAS EAR/PLS OXIMETRY MLT: CPT

## 2020-12-19 PROCEDURE — 2500000003 HC RX 250 WO HCPCS: Performed by: PHYSICIAN ASSISTANT

## 2020-12-19 PROCEDURE — 99232 SBSQ HOSP IP/OBS MODERATE 35: CPT | Performed by: PSYCHIATRY & NEUROLOGY

## 2020-12-19 PROCEDURE — 6360000002 HC RX W HCPCS: Performed by: FAMILY MEDICINE

## 2020-12-19 RX ORDER — DEXAMETHASONE 2 MG/1
2 TABLET ORAL DAILY
Status: COMPLETED | OUTPATIENT
Start: 2020-12-19 | End: 2020-12-21

## 2020-12-19 RX ORDER — DIVALPROEX SODIUM 500 MG/1
500 TABLET, EXTENDED RELEASE ORAL 2 TIMES DAILY
Status: DISCONTINUED | OUTPATIENT
Start: 2020-12-19 | End: 2020-12-28 | Stop reason: HOSPADM

## 2020-12-19 RX ORDER — VENLAFAXINE 37.5 MG/1
37.5 TABLET ORAL DAILY
Status: DISCONTINUED | OUTPATIENT
Start: 2020-12-19 | End: 2020-12-28 | Stop reason: HOSPADM

## 2020-12-19 RX ORDER — DEXAMETHASONE SODIUM PHOSPHATE 4 MG/ML
4 INJECTION, SOLUTION INTRA-ARTICULAR; INTRALESIONAL; INTRAMUSCULAR; INTRAVENOUS; SOFT TISSUE EVERY 6 HOURS
Status: DISCONTINUED | OUTPATIENT
Start: 2020-12-19 | End: 2020-12-19

## 2020-12-19 RX ADMIN — CARVEDILOL 3.12 MG: 3.12 TABLET, FILM COATED ORAL at 09:21

## 2020-12-19 RX ADMIN — DEXAMETHASONE 2 MG: 2 TABLET ORAL at 16:34

## 2020-12-19 RX ADMIN — DEXAMETHASONE 4 MG: 4 TABLET ORAL at 09:21

## 2020-12-19 RX ADMIN — SODIUM CHLORIDE, PRESERVATIVE FREE 10 ML: 5 INJECTION INTRAVENOUS at 20:55

## 2020-12-19 RX ADMIN — CARVEDILOL 3.12 MG: 3.12 TABLET, FILM COATED ORAL at 16:34

## 2020-12-19 RX ADMIN — FAMOTIDINE 20 MG: 10 INJECTION INTRAVENOUS at 09:20

## 2020-12-19 RX ADMIN — DESMOPRESSIN ACETATE 40 MG: 0.2 TABLET ORAL at 20:55

## 2020-12-19 RX ADMIN — STANDARDIZED SENNA CONCENTRATE AND DOCUSATE SODIUM 1 TABLET: 8.6; 5 TABLET ORAL at 20:55

## 2020-12-19 RX ADMIN — DEXAMETHASONE SODIUM PHOSPHATE 4 MG: 4 INJECTION, SOLUTION INTRAMUSCULAR; INTRAVENOUS at 11:42

## 2020-12-19 RX ADMIN — VENLAFAXINE 37.5 MG: 37.5 TABLET ORAL at 16:35

## 2020-12-19 RX ADMIN — DIVALPROEX SODIUM 500 MG: 500 TABLET, EXTENDED RELEASE ORAL at 16:34

## 2020-12-19 RX ADMIN — ENOXAPARIN SODIUM 30 MG: 100 INJECTION SUBCUTANEOUS at 20:56

## 2020-12-19 RX ADMIN — LEVETIRACETAM 500 MG: 500 TABLET, FILM COATED ORAL at 09:20

## 2020-12-19 RX ADMIN — DIVALPROEX SODIUM 500 MG: 500 TABLET, EXTENDED RELEASE ORAL at 20:55

## 2020-12-19 RX ADMIN — ENOXAPARIN SODIUM 30 MG: 100 INJECTION SUBCUTANEOUS at 09:20

## 2020-12-19 RX ADMIN — FAMOTIDINE 20 MG: 10 INJECTION INTRAVENOUS at 20:56

## 2020-12-19 RX ADMIN — SODIUM CHLORIDE, PRESERVATIVE FREE 10 ML: 5 INJECTION INTRAVENOUS at 09:21

## 2020-12-19 ASSESSMENT — PAIN SCALES - GENERAL
PAINLEVEL_OUTOF10: 0

## 2020-12-19 NOTE — PROGRESS NOTES
Physical Therapy  Facility/Department: [facility] NEURO  Daily Treatment Note  NAME: Dary Garcia  : 1945  MRN: [id number]    Date of Service: 2020    Discharge Recommendations:  Patient would benefit from continued therapy after discharge        Assessment   Body structures, Functions, Activity limitations: Decreased functional mobility ; Decreased posture;Decreased endurance;Decreased ROM; Decreased strength;Decreased safe awareness;Decreased balance  Assessment: Pt ambulated ~35ft with RW, CGA. Pt requires increased time to complete all functional mobility. Pt limited by general weakness and SOB. Would benefit from continued PT to address deficits. Activity Tolerance  Activity Tolerance: Patient limited by fatigue;Patient limited by endurance; Patient limited by cognitive status     Patient Diagnosis(es): The primary encounter diagnosis was Brain mass. A diagnosis of Светлана coma scale total score 3-8, in the field (EMT or ambulance) [facility]) was also pertinent to this visit. has no past medical history on file. has a past surgical history that includes craniotomy (Left, 2020) and craniotomy (Left, 2020). Restrictions  Restrictions/Precautions  Restrictions/Precautions: Fall Risk  Required Braces or Orthoses?: No  Position Activity Restriction  Other position/activity restrictions: Up with assist  Subjective   General  Chart Reviewed: Yes  Response To Previous Treatment: Patient with no complaints from previous session. Family / Caregiver Present: No  Subjective  Subjective: RN and pt agreeable to PT this morning. Pt resting in bed upon arrival with no c/o pain.   General Comment  Comments: Pt left on commode with floor aide  Pain Screening  Patient Currently in Pain: Denies  Vital Signs  Patient Currently in Pain: Denies       Orientation  Orientation  Overall Orientation Status: Within Functional Limits  Cognition      Objective   Bed mobility  Supine to Sit: Minimal assistance Transfers  Sit to Stand: Minimal Assistance  Stand to sit: Minimal Assistance  Comment: used RW  Ambulation  Ambulation?: Yes  Ambulation 1  Device: Rolling Walker  Assistance: Contact guard assistance  Quality of Gait: one incident of LOB noted. pt was able to recover requiring Vj  Gait Deviations: Slow Juliet;Decreased step length;Decreased step height  Distance: 35ft  Comments: increased time and effort to complete due to SOB and increased fatigue. cueing for sequencing t/o fair return. Stairs/Curb  Stairs?: No     Balance  Posture: Good  Sitting - Static: Good  Sitting - Dynamic: Good  Standing - Static: Fair;+  Standing - Dynamic: Fair;-  Comments: Standing balance assessed w/ RW; pt able to sit EOB SBA ~10 minutes  Exercises  Seated LE exercise program: Long Arc Quads, hip abduction/adduction, heel/toe raises, and marches. Reps: x10  Goals  Short term goals  Time Frame for Short term goals: 15  Short term goal 1: Pt to perform bed mobility CGA  Short term goal 2: Pt to demonstrate functional transfers CGA  Short term goal 3: Ambulate 100ft w/ least restrictive AD CGA  Short term goal 4: Demonstrate standing balance of good - to decrease fall risk  Patient Goals   Patient goals :  To go home    Plan    Plan  Times per week: 5-6x/week  Current Treatment Recommendations: Strengthening, Transfer Training, Endurance Training, Patient/Caregiver Education & Training, ROM, Balance Training, Gait Training, Home Exercise Program, Functional Mobility Training, Stair training, Safety Education & Training  Safety Devices  Type of devices: Gait belt, Nurse notified, Call light within reach, Patient at risk for falls, All fall risk precautions in place  Restraints  Initially in place: No     Therapy Time   Individual Concurrent Group Co-treatment   Time In 1205         Time Out 1230         Minutes 25         Timed Code Treatment Minutes: [facility], [facility]

## 2020-12-19 NOTE — PROGRESS NOTES
Neurosurgery SUMAYA/Resident    Daily Progress Note   Chief Complaint   Patient presents with   [place] Cerebrovascular Accident     12/19/2020  9:08 AM    Chart reviewed. No acute events overnight. No new complaints. Vitals:    12/18/20 2130 12/19/20 0000 12/19/20 0406 12/19/20 0842   BP: (!) 130/55 127/64 121/62 138/74   Pulse: 66 50 50 61   Resp: 19 16 17 16   Temp: 98.6 °F (37 °C)  97.3 °F (36.3 °C) 98.2 °F (36.8 °C)   TempSrc: Oral  Oral Oral   SpO2: 97% 96% 95% 97%   Weight:   195 lb 6.4 oz (88.6 kg)    Height:           PE:   Alert, oriented to self, difficult to understand at times  Expressive aphasia, follows all commands  Motor   No pronator drift  Moving all extremities       Lab Results   Component Value Date    WBC 7.6 12/19/2020    HGB 14.9 12/19/2020    HCT 44.3 12/19/2020     12/19/2020    HDL 37 (L) 12/09/2020    ALT 28 12/09/2020    AST 23 12/09/2020     (L) 12/19/2020    K 4.2 12/19/2020     12/19/2020    CREATININE 0.75 12/19/2020    BUN 24 (H) 12/19/2020    CO2 21 12/19/2020    TSH 2.76 12/09/2020    INR 1.0 12/09/2020    LABA1C 5.5 12/18/2020       A/P  66 y.o. male who presents with left temporal brain mass  POD#5 s/p awake craniotomy with speech mapping and 5 ALA guided resection of left temporal tumor      - PT and OT for mobilization              - wean decadron over 1 week  - continue keppra for at least 6 months   - hem/onc following patient   - PMR following for possible discharge in IP rehab  - discharging planning- hospice vs IP rehab      Please contact neurosurgery with any changes in patients neurologic status.        Esperanza Lazar CNP  12/19/20  9:08 AM

## 2020-12-19 NOTE — PROGRESS NOTES
[facility] ([facility]) Neurology Specialist  [address]  [place], [address] [facility]:  [phone] or [phone]  FAX:  [phone]            Brief history: Leesa Vazquez is a 66 y.o. old male admitted on 12/9/2020 with left temporal GBM       Subjective: No new neurological events overnight. The patient speech is somewhat more clear today. He denies any headache. No seizures. Objective: /76   Pulse 61   Temp 98.1 °F (36.7 °C) (Oral)   Resp 15   Ht 6' 2\" (1.88 m)   Wt 195 lb 6.4 oz (88.6 kg)   SpO2 97%   BMI 25.09 kg/m²       Medications:    dexamethasone  2 mg Oral Daily    levETIRAcetam  500 mg Oral BID    carvedilol  3.125 mg Oral BID WC    sodium chloride flush  10 mL Intravenous 2 times per day    sennosides-docusate sodium  1 tablet Oral BID    enoxaparin  30 mg Subcutaneous BID    [Held by provider] potassium bicarb-citric acid  40 mEq Oral Daily    famotidine (PEPCID) injection  20 mg Intravenous BID    atorvastatin  40 mg Oral Nightly      General examination:    Head: Normocephalic, atraumatic  Eyes: Extraocular movements intact  Lungs: Respirations unlabored, chest wall no deformity  ENT: Normal external ear canals, no sinus tenderness  Heart: Regular rate rhythm  Abdomen: No masses, tenderness  Extremities: No cyanosis or edema, 2+ pulses  Skin: Intact, normal skin color    Neurological examination:    Mental status   Alert and oriented; mild speech aphasia with impaired repetition impaired naming.      Cranial nerves   II - visual fields intact to confrontation                                                III, IV, VI  extra-ocular muscles full: no pupillary defect; no NEW, no nystagmus, no ptosis   V - normal facial sensation                                                               VII - normal facial symmetry VIII - intact hearing                                                                             IX, X - symmetrical palate                                                                  XI - symmetrical shoulder shrug                                                       XII - midline tongue without atrophy or fasciculation     Motor function  Normal muscle bulk and tone; normal power 5/5, including fine motor movements     Sensory function Intact to touch, pin, vibration, proprioception     Cerebellar Intact fine motor movement. No involuntary movements or tremors     Reflex function Intact 2+ DTR and symmetric. Negative Babinski     Gait                  Not tested      Neurological work up:  CT head  CTA head and neck  MRI brain     2 D echo        Assessment Recommendations:  Left temporal glioblastoma multiforme     The patient speech is gradually improving  Today, the patient appears depressed and has been tearful. I will discontinue Keppra and start Depakote 500 mg twice daily to help with patient's mood stabilization. We will also add on Effexor 37.5 mg p.o. daily  The patient can medically be discharged from the hospital.  He either needs to go to skilled nursing facility or go home with family. The family did not want patient to go to inpatient rehab. This note is created with the assistance of a speech-recognition program. While intending to generate a document that actually reflects the content of the visit, the document can still have some errors including those of syntax and sound a- like substitutions which may escape proofreading. In such instances, actual meaning can be extrapolated by contextual derivation.

## 2020-12-19 NOTE — PLAN OF CARE
Called patients daughter, Fede Alicea, to clarify discharge plans. She states that after long discussion with entire family they have decided to discharge to hospice and change code status to [facility]. She and the family feel if patient does not have >5 years or longer prognosis then do not want to proceed with chemo and radiation and would like comfort care. Feels he has had 68 good years living independently and her dad has declined and would not want to live like this.  updated on conversation.

## 2020-12-19 NOTE — PROGRESS NOTES
Assumed care of patient and received nursing report. Patient is sitting up in bed resting and presents with no s/s of distress. Patient is on room air with normal respirations and is sinus rhythm on the tele monitor. No further request made known at this time. Bed in l/l position with call light within reach.

## 2020-12-19 NOTE — PROGRESS NOTES
Today's Date: 12/18/2020  Patient Name: Sandi Silveira  Date of admission: 12/9/2020  1:32 PM  Patient's age: 66 y.o., 1945  Admission Dx: Brain mass [G93.89]    Reason for Consult: management recommendations for left brain mass  Requesting Physician: Moraima Cherry MD    CHIEF COMPLAINT: Altered mental status    SUBJECTIVE:  . Patient was seen and examined. Slightly confused. Not in pain. No distress. No fever. No new events. BRIEF CASE HISTORY:    The patient is a 66 y.o.  male who is admitted to the hospital for acute encephalopathy and left brain mass. Patient presented with confusion and strokelike symptoms about a week ago. Found to have a left temporal lobe mass with cerebral edema and midline shift on brain imaging. He was managing neuro ICU and received steroids and antiseizure medications. He underwent craniotomy with resection of left temporal lobe tumor on 12/14/2020. Pathology report on sample obtained showed grade 4 glioblastoma multiforme. His past medical history includes essential hypertension and hyperlipidemia. He lost his wife to cancer. His 2 daughters are his designated health power of . Patient is pleasantly confused and is very hard of hearing. He appears to understand simple commands and conversations. He is currently on the neuro stepdown floor. Past Medical History:   has no past medical history on file. Past Surgical History:   has a past surgical history that includes craniotomy (Left, 12/14/2020) and craniotomy (Left, 12/14/2020). Medications:    Reviewed in Epic     Allergies:  Patient has no known allergies. Social History:        Family History: family history is not on file. REVIEW OF SYSTEMS: Limited due to patient's altered mental status  Constitutional: No fever or chills.   Eyes: No eye discharge, double vision, or eye pain   HEENT: negative for sore mouth, sore throat, hoarseness and voice change  136   K 4.4 3.8   CO2 22 24   BUN 28* 31*   CREATININE 0.77 0.90   LABGLOM >60 >60   GLUCOSE 135* 102*     PT/INR: No results for input(s): PROTIME, INR in the last 72 hours. IMAGING DATA:      Primary Problem  <principal problem not specified>    Active Hospital Problems    Diagnosis Date Noted    Thrombocytopenia ([facility] [D69.6]     GBM (glioblastoma multiforme) (HCC) [C71.9]     Acute encephalopathy [G93.40]     Cerebral edema ([address] [G93.6]     Brain mass [G93.89] 12/09/2020         IMPRESSION:   1. Glioblastoma multiforme. High-grade. 2. Cerebral edema. 3. Essential hypertension. 4. Mild thrombocytopenia. RECOMMENDATIONS:  Records, labs and imaging reviewed. Family to decide on course of action   Pt is aware of diagnosis. But unable to communicate plan  Patient slightly confused. Will reach out to family to get their input. Previously I had a very lengthy discussion with the family regarding his care. They will decide whether to pursue active treatment or to consider palliative care only. Likely to go to palliative care                              [address] [place] [address] Hem/Onc Specialists                            This note is created with the assistance of a speech recognition program.  While intending to generate a document that actually reflects the content of the visit, the document can still have some errors including those of syntax and sound a like substitutions which may escape proof reading. It such instances, actual meaning can be extrapolated by contextual diversion.

## 2020-12-20 LAB
ANION GAP SERPL CALCULATED.3IONS-SCNC: 9 MMOL/L (ref 9–17)
BUN BLDV-MCNC: 27 MG/DL (ref 8–23)
BUN/CREAT BLD: ABNORMAL (ref 9–20)
CALCIUM IONIZED: 1.13 MMOL/L (ref 1.13–1.33)
CALCIUM SERPL-MCNC: 8.4 MG/DL (ref 8.6–10.4)
CHLORIDE BLD-SCNC: 104 MMOL/L (ref 98–107)
CO2: 21 MMOL/L (ref 20–31)
CREAT SERPL-MCNC: 0.73 MG/DL (ref 0.7–1.2)
GFR AFRICAN AMERICAN: >60 ML/MIN
GFR NON-AFRICAN AMERICAN: >60 ML/MIN
GFR SERPL CREATININE-BSD FRML MDRD: ABNORMAL ML/MIN/{1.73_M2}
GFR SERPL CREATININE-BSD FRML MDRD: ABNORMAL ML/MIN/{1.73_M2}
GLUCOSE BLD-MCNC: 132 MG/DL (ref 70–99)
HCT VFR BLD CALC: 44.7 % (ref 40.7–50.3)
HEMOGLOBIN: 14.9 G/DL (ref 13–17)
MCH RBC QN AUTO: 30.9 PG (ref 25.2–33.5)
MCHC RBC AUTO-ENTMCNC: 33.3 G/DL (ref 28.4–34.8)
MCV RBC AUTO: 92.7 FL (ref 82.6–102.9)
NRBC AUTOMATED: 0 PER 100 WBC
PDW BLD-RTO: 12.8 % (ref 11.8–14.4)
PHOSPHORUS: 2.9 MG/DL (ref 2.5–4.5)
PLATELET # BLD: 157 K/UL (ref 138–453)
PMV BLD AUTO: 10.1 FL (ref 8.1–13.5)
POTASSIUM SERPL-SCNC: 4.3 MMOL/L (ref 3.7–5.3)
RBC # BLD: 4.82 M/UL (ref 4.21–5.77)
SODIUM BLD-SCNC: 134 MMOL/L (ref 135–144)
WBC # BLD: 8.9 K/UL (ref 3.5–11.3)

## 2020-12-20 PROCEDURE — 99024 POSTOP FOLLOW-UP VISIT: CPT | Performed by: NEUROLOGICAL SURGERY

## 2020-12-20 PROCEDURE — 6360000002 HC RX W HCPCS: Performed by: STUDENT IN AN ORGANIZED HEALTH CARE EDUCATION/TRAINING PROGRAM

## 2020-12-20 PROCEDURE — 2500000003 HC RX 250 WO HCPCS: Performed by: PHYSICIAN ASSISTANT

## 2020-12-20 PROCEDURE — 2580000003 HC RX 258: Performed by: PHYSICIAN ASSISTANT

## 2020-12-20 PROCEDURE — 6370000000 HC RX 637 (ALT 250 FOR IP): Performed by: PHYSICIAN ASSISTANT

## 2020-12-20 PROCEDURE — 85027 COMPLETE CBC AUTOMATED: CPT

## 2020-12-20 PROCEDURE — 6370000000 HC RX 637 (ALT 250 FOR IP): Performed by: NURSE PRACTITIONER

## 2020-12-20 PROCEDURE — 2060000000 HC ICU INTERMEDIATE R&B

## 2020-12-20 PROCEDURE — 6370000000 HC RX 637 (ALT 250 FOR IP): Performed by: PSYCHIATRY & NEUROLOGY

## 2020-12-20 PROCEDURE — 6370000000 HC RX 637 (ALT 250 FOR IP): Performed by: REGISTERED NURSE

## 2020-12-20 PROCEDURE — 82330 ASSAY OF CALCIUM: CPT

## 2020-12-20 PROCEDURE — 84100 ASSAY OF PHOSPHORUS: CPT

## 2020-12-20 PROCEDURE — 99232 SBSQ HOSP IP/OBS MODERATE 35: CPT | Performed by: PSYCHIATRY & NEUROLOGY

## 2020-12-20 PROCEDURE — 80048 BASIC METABOLIC PNL TOTAL CA: CPT

## 2020-12-20 PROCEDURE — 36415 COLL VENOUS BLD VENIPUNCTURE: CPT

## 2020-12-20 RX ADMIN — CARVEDILOL 3.12 MG: 3.12 TABLET, FILM COATED ORAL at 18:07

## 2020-12-20 RX ADMIN — DIVALPROEX SODIUM 500 MG: 500 TABLET, EXTENDED RELEASE ORAL at 20:14

## 2020-12-20 RX ADMIN — DEXAMETHASONE 2 MG: 2 TABLET ORAL at 08:15

## 2020-12-20 RX ADMIN — ENOXAPARIN SODIUM 30 MG: 100 INJECTION SUBCUTANEOUS at 20:14

## 2020-12-20 RX ADMIN — FAMOTIDINE 20 MG: 10 INJECTION INTRAVENOUS at 20:14

## 2020-12-20 RX ADMIN — DIVALPROEX SODIUM 500 MG: 500 TABLET, EXTENDED RELEASE ORAL at 08:15

## 2020-12-20 RX ADMIN — CARVEDILOL 3.12 MG: 3.12 TABLET, FILM COATED ORAL at 08:15

## 2020-12-20 RX ADMIN — SODIUM CHLORIDE, PRESERVATIVE FREE 10 ML: 5 INJECTION INTRAVENOUS at 20:03

## 2020-12-20 RX ADMIN — SODIUM CHLORIDE, PRESERVATIVE FREE 10 ML: 5 INJECTION INTRAVENOUS at 08:15

## 2020-12-20 RX ADMIN — STANDARDIZED SENNA CONCENTRATE AND DOCUSATE SODIUM 1 TABLET: 8.6; 5 TABLET ORAL at 20:13

## 2020-12-20 RX ADMIN — DESMOPRESSIN ACETATE 40 MG: 0.2 TABLET ORAL at 20:13

## 2020-12-20 RX ADMIN — VENLAFAXINE 37.5 MG: 37.5 TABLET ORAL at 08:15

## 2020-12-20 RX ADMIN — ENOXAPARIN SODIUM 30 MG: 100 INJECTION SUBCUTANEOUS at 08:15

## 2020-12-20 RX ADMIN — FAMOTIDINE 20 MG: 10 INJECTION INTRAVENOUS at 08:15

## 2020-12-20 NOTE — CARE COORDINATION
Neurology is planning a family meeting on 12/21 to establish goals of care/code status. [facility] hospice has accepted patient. Family is aware of 200.00 a day cost. Patient remains a FULL CODE here. Patient is stating to the physicians and the RN that he wants to live. He is aware that he has a brain tumor, but want to live and wants treatment. POA paper in chart stating his daughter Rafiq Hogan is his [facility]. POA, but if patient is able to make his own decisions POA does not come into play. Palliative has been consulted.

## 2020-12-20 NOTE — PROGRESS NOTES
[facility] Neurology   [address]    Progress note             Date:   12/20/2020  Patient name:  Lawanda Stephenson  Date of admission:  12/9/2020  1:32 PM  MRN:   [id number]  Account:  [de-identified]  YOB: 1945  PCP:    No primary care provider on file. Room:   [address] [place] [address]  Code Status:    Full Code    Chief Complaint:     Chief Complaint   Patient presents with    Cerebrovascular Accident   GBM s/p resection    Interval hx:   No acute events overnight, no significant change in patient's neurological status, he is awake and alert and able to follow commands. Awaiting disposition. Patient is tolerating p.o.        Brief History of Present Illness:       64-year-old male who initially presented for altered mental status after nursing the Facebook posts were not making sense at home. Following day, the patient's mental status declined and he became less responsive. Brought to the hospital with an NIH stroke scale of 20, aphasia. Patient was found to have a left posterior temporal lobe mass with vasogenic edema. He underwent resection of underlying mass. This is consistent with GBM. Hematology was also consulted. Past Medical History:     History reviewed. No pertinent past medical history. Past Surgical History:     Past Surgical History:   Procedure Laterality Date    CRANIOTOMY Left 12/14/2020    temporal for tumor    CRANIOTOMY Left 12/14/2020    TEMPORAL CRANIOTOMY FOR TUMOR, IMAGE GUIDED, AWAKE performed by Parris Dyson DO at [place] [address]        Medications Prior to Admission:     Prior to Admission medications    Not on File        Allergies:     Patient has no known allergies. Social History:     Tobacco:    has no history on file for tobacco.  Alcohol:      has no history on file for alcohol. Drug Use:  has no history on file for drug. Family History:     History reviewed. No pertinent family history.     Review of Systems:     ROS: Constitutional  Negative for fever and chills    HEENT  Negative for ear discharge, ear pain, nosebleed    Eyes  Negative for photophobia, pain and discharge    Respiratory  Negative for hemoptysis and sputum    Cardiovascular  Negative for orthopnea, claudication and PND    Gastrointestinal  Negative for abdominal pain, diarrhea, blood in stool    Musculoskeletal  Negative for joint pain, negative for myalgia    Neurology Negative for seizures, loss of consciousness   Skin  Negative for rash or itching    Endo/heme/allergies  Negative for polydipsia, environmental allergy    Psychiatric/behavioral  Negative for suicidal ideation. Patient is not anxious        Physical Exam:   /66   Pulse 66   Temp 97.4 °F (36.3 °C) (Oral)   Resp 14   Ht 6' 2\" (1.88 m)   Wt 193 lb 3.2 oz (87.6 kg)   SpO2 95%   BMI 24.81 kg/m²   Temp (24hrs), Av.4 °F (36.3 °C), Min:96.8 °F (36 °C), Max:98.1 °F (36.7 °C)    Recent Labs     20  1655 20  2130 20  0759 20  2036   POCGLU 120* 132* 93 167*       Intake/Output Summary (Last 24 hours) at 2020 0924  Last data filed at 2020 0849  Gross per 24 hour   Intake 980 ml   Output 1225 ml   Net -245 ml         NEUROLOGIC EXAMINATION  GENERAL  Appears comfortable and in no distress   HEENT  NC/ AT   NECK  Supple and no bruits heard   MENTAL STATUS:  Alert, oriented x2, impaired memory, expressive aphasia with some dysarthria. CRANIAL NERVES: II     -      Visual fields intact to confrontation  III,IV,VI -  EOMs full, no afferent defect, no NEW, no ptosis  V     -     Normal facial sensation  VII    -     Normal facial symmetry  VIII   -     Intact hearing  IX,X -     Symmetrical palate  XI    -     Symmetrical shoulder shrug  XII   -     Midline tongue, no atrophy    MOTOR FUNCTION:  In all extremities spontaneously and on command.    SENSORY FUNCTION:  Withdraws all 4 extremities to noxious stimuli CEREBELLAR FUNCTION:  Intact fine motor control over upper limbs   REFLEX FUNCTION:  Symmetric, no perverted reflex, no Babinski sign   STATION and GAIT  Not tested       Investigations:      Laboratory Testing:  Recent Results (from the past 24 hour(s))   HEPATIC FUNCTION PANEL    Collection Time: 12/19/20  1:22 PM   Result Value Ref Range    Alb 3.2 (L) 3.5 - 5.2 g/dL    Alkaline Phosphatase 50 40 - 129 U/L    ALT 88 (H) 5 - 41 U/L    AST 25 <40 U/L    Total Bilirubin 0.53 0.3 - 1.2 mg/dL    Bilirubin, Direct 0.15 <0.31 mg/dL    Bilirubin, Indirect 0.38 0.00 - 1.00 mg/dL    Total Protein 5.7 (L) 6.4 - 8.3 g/dL    Globulin NOT REPORTED 1.5 - 3.8 g/dL    Albumin/Globulin Ratio 1.3 1.0 - 2.5   POC Glucose Fingerstick    Collection Time: 12/19/20  8:36 PM   Result Value Ref Range    POC Glucose 167 (H) 75 - 110 mg/dL   Basic Metabolic Panel    Collection Time: 12/20/20  4:33 AM   Result Value Ref Range    Glucose 132 (H) 70 - 99 mg/dL    BUN 27 (H) 8 - 23 mg/dL    CREATININE 0.73 0.70 - 1.20 mg/dL    Bun/Cre Ratio NOT REPORTED 9 - 20    Calcium 8.4 (L) 8.6 - 10.4 mg/dL    Sodium 134 (L) 135 - 144 mmol/L    Potassium 4.3 3.7 - 5.3 mmol/L    Chloride 104 98 - 107 mmol/L    CO2 21 20 - 31 mmol/L    Anion Gap 9 9 - 17 mmol/L    GFR Non-African American >60 >60 mL/min    GFR African American >60 >60 mL/min    GFR Comment          GFR Staging NOT REPORTED    CBC    Collection Time: 12/20/20  4:33 AM   Result Value Ref Range    WBC 8.9 3.5 - 11.3 k/uL    RBC 4.82 4.21 - 5.77 m/uL    Hemoglobin 14.9 13.0 - 17.0 g/dL    Hematocrit 44.7 40.7 - 50.3 %    MCV 92.7 82.6 - 102.9 fL    MCH 30.9 25.2 - 33.5 pg    MCHC 33.3 28.4 - 34.8 g/dL    RDW 12.8 11.8 - 14.4 %    Platelets 727 094 - 165 k/uL    MPV 10.1 8.1 - 13.5 fL    NRBC Automated 0.0 0.0 per 100 WBC   PHOSPHORUS    Collection Time: 12/20/20  4:33 AM   Result Value Ref Range    Phosphorus 2.9 2.5 - 4.5 mg/dL   CALCIUM, IONIZED    Collection Time: 12/20/20  4:33 AM Result Value Ref Range    Calcium, Ion 1.13 1.13 - 1.33 mmol/L         Assessment :      Primary Problem  <principal problem not specified>    Active Hospital Problems    Diagnosis Date Noted    Th[facility] [address] [facility] [D69.6]     GBM (glioblastoma multiforme) ([facility] [facility] [C71.9]     Acute encephalopathy [G93.40]     Cerebral edema ([facility] [address] [facility] [G93.6]     Brain mass [G93.89] 12/09/2020       Plan:     77year-old status post resection of temporal mass consistent with GBM. Heme oncology is currently on board, patient's expressive aphasia/dysarthria has significantly improved. He remains on Decadron as per neurosurgery along with seizure prophylaxis with Keppra. Awaiting disposition decision with family regarding SNF versus hospice. Patient has been hemodynamically stable. NeurologicalGBM s/p resection  Continue to monitor for any neurological changes. Continue seizure precautions as directed. Seizure prophylaxis with Keppra. Continue Decadrontapering as per neurosurgery  Neuro monitor blood sugars  Disposition pending        Follow-up further recommendations after discussing the case with attending  The plan was discussed with the patient, patient's family and the medical staff. Consultations:   IP CONSULT TO STROKE TEAM  IP CONSULT TO NEUROSURGERY  IP CONSULT TO NEUROCRITICAL CARE  IP CONSULT TO PHYSICAL MEDICINE REHAB  IP CONSULT TO CARDIOLOGY  IP CONSULT TO PHYSICAL MEDICINE REHAB  IP CONSULT TO HEM/ONC  IP CONSULT TO HEM/ONC  IP CONSULT TO NEUROLOGY  IP CONSULT TO PALLIATIVE CARE    Patient is admitted as inpatient status because of co-morbidities listed above, severity of signs and symptoms as outlined, requirement for current medical therapies and most importantly because of direct risk to patient if care not provided in a hospital setting. Cheryl Pitts MD  12/20/2020  9:24 AM    Copy sent to Dr. Gayle primary care provider on file.

## 2020-12-20 NOTE — PROGRESS NOTES
Progress Note - Neurosurgery    Chief Complaint   Patient presents with    Cerebrovascular Accident       Subjective:  Richmond Aguilar is a 66 y.o. male. Some expressive aphasia but the patient denies any pain. He was able to respond appropriately to some extent and did acknowledge having surgery but otherwise confused. Review of Systems    Objective:  Blood pressure 130/66, pulse 66, temperature 97.4 °F (36.3 °C), temperature source Oral, resp. rate 14, height 6' 2\" (1.88 m), weight 193 lb 3.2 oz (87.6 kg), SpO2 95 %. Physical Exam  Neurologic Exam   Evident expressive aphasia. Was oriented to person. Did move all extremities to command. Labs:  No results found for any previous visit. Results for orders placed during the hospital encounter of 12/09/20   MRI BRAIN W WO CONTRAST    Narrative EXAMINATION:  MRI OF THE BRAIN WITHOUT AND WITH CONTRAST  12/15/2020 4:06 pm    TECHNIQUE:  Multiplanar multisequence MRI of the head/brain was performed without and  with the administration of intravenous contrast.    COMPARISON:  MRI brain 12/09/2020. HISTORY:  ORDERING SYSTEM PROVIDED HISTORY: s/p tumor resection  TECHNOLOGIST PROVIDED HISTORY:  Patient needs to be in dark for 48 hours postop. Please obtain with blanket  over face. s/p tumor resection  Reason for Exam: s/p tumor resection    FINDINGS:  INTRACRANIAL STRUCTURES/VENTRICLES: No evidence of acute stroke. There is  abnormal restricted diffusion identified within the surgical cavity likely  related to blood products. There is T2 prolongation and diffusion  hyperintensity identified extending along the splenium corpus callosum likely  due to the known mass. The postsurgical changes status post resection  of/debulking of the left temporal lobe mass. There are blood products and  proteinaceous debris identified along the resection cavity.   Blood products  extend into the occipital horn left lateral ventricle with subependymal hemosiderin. There is surrounding T2 prolongation identified within left  temporal lobe extending along the splenium of the corpus callosum. This does  not appear to cross the midline. There is abnormal T2 prolongation  identified involving the posterior limb internal capsule and the posterior  subinsular region. Intrinsic T1 shortening identified along the resection cavity appears to  match the areas of postcontrast enhancement, however there appears to be  subtle areas of subependymal enhancement left peritrigonal region left  lateral ventricle just superior to the body of the occipital horn. No  definite nodular enhancement identified within resection cavity. There is left-to-right midline shift measuring 7 mm. Sulcal effacement  identified left temporal lobe. There is abnormal signal identified overlying  both frontal convexities due to the pneumocephalus related to recent surgery. There is a left frontal parietal and temporal extra-axial collection  corresponding to the recent left-sided craniotomy. This likely corresponds  to the postoperative hematoma. ORBITS: The visualized portion of the orbits demonstrate no acute abnormality. SINUSES: Mild paranasal sinus mucosal thickening. BONES/SOFT TISSUES: Postsurgical changes left temporal region. Impression Expected postoperative findings status left temporal craniotomy with T2  prolongation identified within left temporal lobe extending along splenium  corpus callosum worrisome for possibility of underlying infiltrative tumor. Additionally there is evidence of questionable subependymal enhancement which  could be related to recent surgery/postoperative proteinaceous debris/blood  products versus early subependymal spread of tumor.     There is evidence of intrinsic T1 shortening identified on operative bed  consistent with postop blood and proteinaceous debris postoperative changes without convincing evidence of enhancing nodularity within the resection  cavity. Postoperative extra-axial fluid collection identified on left. 7 mm of left-to-right midline shift. Continued surveillance is recommended given the above findings. No results found for this or any previous visit. No results found for this or any previous visit. Results for orders placed during the hospital encounter of 12/09/20   CT HEAD WO CONTRAST    Narrative EXAMINATION:  CT OF THE HEAD WITHOUT CONTRAST  12/14/2020 2:30 pm    TECHNIQUE:  CT of the head was performed without the administration of intravenous  contrast. Dose modulation, iterative reconstruction, and/or weight based  adjustment of the mA/kV was utilized to reduce the radiation dose to as low  as reasonably achievable. COMPARISON:  None. HISTORY:  ORDERING SYSTEM PROVIDED HISTORY: s/p tumor resection  TECHNOLOGIST PROVIDED HISTORY:  Please complete before leaving pacu and transfer patient with blanket over  face. Patient must be kept in dark room 48 hrs post op. Please complete CTH  with blanket over face. s/p tumor resection    Reason for Exam: post op    FINDINGS:  BRAIN/VENTRICLES:    Extensive bifrontal pneumocephaly  and also  along the left temporal parietal  region. Postsurgical changes in the left temporoparietal lobe with mild  residual extra-axial hemorrhage and intra-axial edema. Mild midline shift  from left to right. Left temporoparietal craniotomy defects. Chronic right  ethmoid and sphenoid sinus disease. Impression [  Status post left craniotomy with postsurgical changes in the left  temporoparietal lobe with residual edema, pneumocephaly  and mild extra-axial  hemorrhage. 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 Per the patient's wishes family has decided on hospice care. Transition planning is ongoing per the .     Danisha Fisher  12/20/2020

## 2020-12-21 LAB
ANION GAP SERPL CALCULATED.3IONS-SCNC: 9 MMOL/L (ref 9–17)
BUN BLDV-MCNC: 26 MG/DL (ref 8–23)
BUN/CREAT BLD: ABNORMAL (ref 9–20)
CALCIUM IONIZED: 1.1 MMOL/L (ref 1.13–1.33)
CALCIUM SERPL-MCNC: 8.4 MG/DL (ref 8.6–10.4)
CHLORIDE BLD-SCNC: 102 MMOL/L (ref 98–107)
CO2: 21 MMOL/L (ref 20–31)
CREAT SERPL-MCNC: 0.79 MG/DL (ref 0.7–1.2)
GFR AFRICAN AMERICAN: >60 ML/MIN
GFR NON-AFRICAN AMERICAN: >60 ML/MIN
GFR SERPL CREATININE-BSD FRML MDRD: ABNORMAL ML/MIN/{1.73_M2}
GFR SERPL CREATININE-BSD FRML MDRD: ABNORMAL ML/MIN/{1.73_M2}
GLUCOSE BLD-MCNC: 103 MG/DL (ref 70–99)
HCT VFR BLD CALC: 46.6 % (ref 40.7–50.3)
HEMOGLOBIN: 15.1 G/DL (ref 13–17)
MCH RBC QN AUTO: 31.1 PG (ref 25.2–33.5)
MCHC RBC AUTO-ENTMCNC: 32.4 G/DL (ref 28.4–34.8)
MCV RBC AUTO: 95.9 FL (ref 82.6–102.9)
NRBC AUTOMATED: 0 PER 100 WBC
PDW BLD-RTO: 13 % (ref 11.8–14.4)
PHOSPHORUS: 3 MG/DL (ref 2.5–4.5)
PLATELET # BLD: 161 K/UL (ref 138–453)
PMV BLD AUTO: 9.8 FL (ref 8.1–13.5)
POTASSIUM SERPL-SCNC: 4.2 MMOL/L (ref 3.7–5.3)
RBC # BLD: 4.86 M/UL (ref 4.21–5.77)
SODIUM BLD-SCNC: 132 MMOL/L (ref 135–144)
WBC # BLD: 9.3 K/UL (ref 3.5–11.3)

## 2020-12-21 PROCEDURE — 82330 ASSAY OF CALCIUM: CPT

## 2020-12-21 PROCEDURE — 85027 COMPLETE CBC AUTOMATED: CPT

## 2020-12-21 PROCEDURE — 2580000003 HC RX 258: Performed by: PHYSICIAN ASSISTANT

## 2020-12-21 PROCEDURE — 84100 ASSAY OF PHOSPHORUS: CPT

## 2020-12-21 PROCEDURE — 99232 SBSQ HOSP IP/OBS MODERATE 35: CPT | Performed by: INTERNAL MEDICINE

## 2020-12-21 PROCEDURE — 99222 1ST HOSP IP/OBS MODERATE 55: CPT | Performed by: NURSE PRACTITIONER

## 2020-12-21 PROCEDURE — 99232 SBSQ HOSP IP/OBS MODERATE 35: CPT | Performed by: PHYSICAL MEDICINE & REHABILITATION

## 2020-12-21 PROCEDURE — 6360000002 HC RX W HCPCS: Performed by: STUDENT IN AN ORGANIZED HEALTH CARE EDUCATION/TRAINING PROGRAM

## 2020-12-21 PROCEDURE — 2500000003 HC RX 250 WO HCPCS: Performed by: PHYSICIAN ASSISTANT

## 2020-12-21 PROCEDURE — 6370000000 HC RX 637 (ALT 250 FOR IP): Performed by: PSYCHIATRY & NEUROLOGY

## 2020-12-21 PROCEDURE — 6370000000 HC RX 637 (ALT 250 FOR IP): Performed by: PHYSICIAN ASSISTANT

## 2020-12-21 PROCEDURE — 97110 THERAPEUTIC EXERCISES: CPT

## 2020-12-21 PROCEDURE — 97116 GAIT TRAINING THERAPY: CPT

## 2020-12-21 PROCEDURE — 2060000000 HC ICU INTERMEDIATE R&B

## 2020-12-21 PROCEDURE — 80048 BASIC METABOLIC PNL TOTAL CA: CPT

## 2020-12-21 PROCEDURE — 99232 SBSQ HOSP IP/OBS MODERATE 35: CPT | Performed by: PSYCHIATRY & NEUROLOGY

## 2020-12-21 PROCEDURE — 92507 TX SP LANG VOICE COMM INDIV: CPT

## 2020-12-21 PROCEDURE — APPSS30 APP SPLIT SHARED TIME 16-30 MINUTES: Performed by: PHYSICIAN ASSISTANT

## 2020-12-21 PROCEDURE — 99232 SBSQ HOSP IP/OBS MODERATE 35: CPT | Performed by: NEUROLOGICAL SURGERY

## 2020-12-21 PROCEDURE — 36415 COLL VENOUS BLD VENIPUNCTURE: CPT

## 2020-12-21 PROCEDURE — 6370000000 HC RX 637 (ALT 250 FOR IP): Performed by: NURSE PRACTITIONER

## 2020-12-21 PROCEDURE — 6370000000 HC RX 637 (ALT 250 FOR IP): Performed by: REGISTERED NURSE

## 2020-12-21 RX ADMIN — VENLAFAXINE 37.5 MG: 37.5 TABLET ORAL at 08:19

## 2020-12-21 RX ADMIN — CARVEDILOL 3.12 MG: 3.12 TABLET, FILM COATED ORAL at 08:19

## 2020-12-21 RX ADMIN — DIVALPROEX SODIUM 500 MG: 500 TABLET, EXTENDED RELEASE ORAL at 08:19

## 2020-12-21 RX ADMIN — FAMOTIDINE 20 MG: 10 INJECTION INTRAVENOUS at 20:14

## 2020-12-21 RX ADMIN — DEXAMETHASONE 2 MG: 2 TABLET ORAL at 08:19

## 2020-12-21 RX ADMIN — DESMOPRESSIN ACETATE 40 MG: 0.2 TABLET ORAL at 20:14

## 2020-12-21 RX ADMIN — ENOXAPARIN SODIUM 30 MG: 100 INJECTION SUBCUTANEOUS at 08:18

## 2020-12-21 RX ADMIN — ENOXAPARIN SODIUM 30 MG: 100 INJECTION SUBCUTANEOUS at 20:14

## 2020-12-21 RX ADMIN — DIVALPROEX SODIUM 500 MG: 500 TABLET, EXTENDED RELEASE ORAL at 20:14

## 2020-12-21 RX ADMIN — SODIUM CHLORIDE, PRESERVATIVE FREE 10 ML: 5 INJECTION INTRAVENOUS at 20:14

## 2020-12-21 RX ADMIN — STANDARDIZED SENNA CONCENTRATE AND DOCUSATE SODIUM 1 TABLET: 8.6; 5 TABLET ORAL at 08:19

## 2020-12-21 RX ADMIN — SODIUM CHLORIDE, PRESERVATIVE FREE 10 ML: 5 INJECTION INTRAVENOUS at 08:18

## 2020-12-21 RX ADMIN — FAMOTIDINE 20 MG: 10 INJECTION INTRAVENOUS at 08:24

## 2020-12-21 NOTE — PROGRESS NOTES
Physical Medicine & Rehabilitation  Progress Note    12/21/2020 12:06 PM     CC: Ambulatory and ADL dysfunction due to change in mental status and weaknessglioblastoma status post resection    Brief history  64-year-old male admitted 12/9/2020 found to have grade 4 glioblastoma status post resection 12/14/2020. He is weaning off Decadron and is on seizure prophylaxis    Hematology/oncologyplan for chemoradiation, family to decide on active treatment or palliative care    Neurosurgeryokay to resume Lovenox surgical pathology grade 4 glioblastoma, wean Decadron over 1 week keep Keppra for 6 months    Neuro critical Decadron 4 mg twice daily x2 days then 4 mg dailyclarify taper on Lovenox       Noted referred to inpatient hospice, daughter has Covid    Subjective:   No complaints. Feels well    ROS:  Denies fevers, chills, sweats. No chest pain, palpitations, lightheadedness. Denies coughing, wheezing or shortness of breath. Denies abdominal pain, nausea, diarrhea or constipation. No new areas of joint pain. Denies new areas of numbness or weakness. Denies new anxiety or depression issues. No new skin problems. Rehabilitation:   PT: 5/19     Restrictions/Precautions: Fall Risk  Other position/activity restrictions: Up with assist   Transfers  Sit to Stand: Minimal Assistance  Stand to sit: Minimal Assistance  Bed to Chair: Minimal assistance, 2 Person Assistance  Comment: used RW  Ambulation 1  Surface: level tile  Device: Rolling Walker  Other Apparatus: Wheelchair follow  Assistance: Contact guard assistance  Quality of Gait: one incident of LOB noted. pt was able to recover requiring Vj  Gait Deviations: Slow Juliet, Decreased step length, Decreased step height  Distance: 35ft  Comments: increased time and effort to complete due to SOB and increased fatigue. cueing for sequencing t/o fair return.            OT:12/16  ADL  Feeding: Supervision  Grooming: Stand by assistance, Setup, Verbal cueing UE Bathing: Minimal assistance, Increased time to complete, Contact guard assistance  LE Bathing: Maximum assistance, Increased time to complete  UE Dressing: Minimal assistance, Increased time to complete  LE Dressing: Setup, Verbal cueing, Increased time to complete, Maximum assistance  Toileting: Increased time to complete, Moderate assistance  Additional Comments: Per RN pt. not to have lights on in room untill later this date for precautions from procceger. ADL/Grooming activity sitting EOB 35 minutes. Grooming activity SBA+set up and VCs for sequensing d/t  vision/lighting in room. UB bathe min A to dry L UE, per pt. \"I just cant see what I'm doing\". LB bathe max A to bathe below B knees d/t decreasd flexability and fatigue. LB dress max A to don/doff B socks, UB dress min A -CGA d/t difficulty with lines and decreased vision d/t environment. Balance  Sitting Balance: Stand by assistance  Standing Balance: Contact guard assistance   Standing Balance  Time: Sitting 35 minutes, standing- CGA-min A  Activity: Dynamic sitting- ADL/grooming activity, Dynamic stand- Min A-CGA side step to [facility]  Comment: utilizing RW  Functional Mobility  Functional - Mobility Device: Rolling Walker  Activity: To/from bathroom  Assist Level: Minimal assistance  Functional Mobility Comments: side steps to [facility]     Bed mobility  Supine to Sit: Minimal assistance  Sit to Supine: Minimal assistance  Scooting: Stand by assistance  Comment: increased time and effort to complete.   Transfers  Stand Step Transfers: Minimal assistance  Sit to stand: Minimal assistance  Stand to sit: Minimal assistance  Transfer Comments: utilizing RW   Toilet Transfers  Toilet - Technique: Ambulating  Equipment Used: Standard toilet  Toilet Transfer: Minimal assistance  Toilet Transfers Comments: utilizing grab bar for sit><stand from low toilet BSC placed over toilet to increase height in attempt to increase pt independence ST:    Auditory Comprehension:   1-step commands: 5/5 independently (pt required occasional repetitions)  2-step commands: 2/4 increased to 4/4 with min verbal and visual cues and repetition of instruction.     Verbal Expression:   Object Naming: 3/6 with mild cues  increased to 6/6 with max verbal cues   [facility] Questions: 3/6, increased to 5/6 with max verbal and redirection  Biographical information: Pt oriented to first and last name and age independently. Reporting he wants to go to rehab and then go home. Responsive Naming 3/6 increased to 5/6 with max cues     Pt. With appropriate simple conversational responses with paraphasic errors noted at times. Often states he cannot think of the word. Education provided re: circumlocution. Decreased comprehension noted        Objective:  /70   Pulse 59   Temp 97.4 °F (36.3 °C) (Oral)   Resp 15   Ht 6' 2\" (1.88 m)   Wt 200 lb (90.7 kg)   SpO2 96%   BMI 25.68 kg/m²  I Body mass index is 25.68 kg/m². I   Wt Readings from Last 1 Encounters:   20 200 lb (90.7 kg)      Temp (24hrs), Av.5 °F (36.4 °C), Min:97.3 °F (36.3 °C), Max:97.7 °F (36.5 °C)         GEN: well developed, well nourished, no acute distress  HEENT: Normocephalic atraumatic, EOMI, mucous membranes pink and moist ,calp incision clean, decreased hearing  CV: RRR, no murmurs, rubs or gallops  PULM: CTAB, no rales or rhonchi. Respirations WNL and unlabored  ABD: soft, NT, ND, +BS and equal  NEURO: A&O x3. Sensation intact to light touch. MSK: 4-5 upper extremities 3-4/5 lower extremities  EXTREMITIES: No calf tenderness to palpation bilaterally. No edema BLEs  SKIN: warm dry and intact with good turgor  PSYCH: appropriately interactive. Affect WNL.           Medications   Scheduled Meds:   divalproex  500 mg Oral BID    venlafaxine  37.5 mg Oral Daily    carvedilol  3.125 mg Oral BID WC    sodium chloride flush  10 mL Intravenous 2 times per day  sennosides-docusate sodium  1 tablet Oral BID    enoxaparin  30 mg Subcutaneous BID    [Held by provider] potassium bicarb-citric acid  40 mEq Oral Daily    famotidine (PEPCID) injection  20 mg Intravenous BID    atorvastatin  40 mg Oral Nightly     Continuous Infusions:   dextrose       PRN Meds:.labetalol, sodium chloride flush, sodium chloride flush, acetaminophen, oxyCODONE **OR** oxyCODONE, morphine **OR** morphine, magnesium hydroxide, bisacodyl, promethazine **OR** ondansetron, glucose, dextrose, glucagon (rDNA), dextrose, sodium chloride flush     Diagnostics:     CBC:   Recent Labs     12/19/20  0510 12/20/20  0433 12/21/20 0439   WBC 7.6 8.9 9.3   RBC 4.67 4.82 4.86   HGB 14.9 14.9 15.1   HCT 44.3 44.7 46.6   MCV 94.9 92.7 95.9   RDW 13.0 12.8 13.0    157 161     BMP:   Recent Labs     12/19/20  0510 12/20/20  0433 12/21/20 0439   * 134* 132*   K 4.2 4.3 4.2    104 102   CO2 21 21 21   PHOS 3.0 2.9 3.0   BUN 24* 27* 26*   CREATININE 0.75 0.73 0.79     BNP: No results for input(s): BNP in the last 72 hours. PT/INR: No results for input(s): PROTIME, INR in the last 72 hours. APTT: No results for input(s): APTT in the last 72 hours. CARDIAC ENZYMES: No results for input(s): CKMB, CKMBINDEX, TROPONINT in the last 72 hours. Invalid input(s): CKTOTAL;3  FASTING LIPID PANEL:  Lab Results   Component Value Date    HDL 37 (L) 12/09/2020     LIVER PROFILE:   Recent Labs     12/19/20  1322   AST 25   ALT 88*   BILIDIR 0.15   BILITOT 0.53   ALKPHOS 50        I/O (24Hr):     Intake/Output Summary (Last 24 hours) at 12/21/2020 1206  Last data filed at 12/21/2020 0400  Gross per 24 hour   Intake 360 ml   Output 775 ml   Net -415 ml       Glu last 24 hour  Recent Labs     12/18/20  1655 12/18/20  2130 12/19/20  0759 12/19/20 2036   POCGLU 120* 132* 93 167* No results for input(s): CLARITYU, COLORU, PHUR, SPECGRAV, PROTEINU, RBCUA, BLOODU, BACTERIA, NITRU, WBCUA, LEUKOCYTESUR, YEAST, GLUCOSEU, BILIRUBINUR in the last 72 hours. Impression: Mr. Berto Suarez is a 66 y.o.  male with a history of <principal problem not specified>     1.  Left posterior temporal mass, change in mental status, left facial droopplan for resection/biopsy 12/14 Decadron taper completed   2. Seizure keep for 6 months, now on Depakoteadjusted to help with mood stabilization  3. Aphasia  4. hard of hearing  4. GERDPepcid  5. GBS grade 4oncology has discussed treatment options chemoradiation, family deciding on treatment versus palliative, family wants hospice, patient would like [facility] rehab and treatment  6. Painmorphine, Roxicodone, Tylenol  7. HypertensionCoreg  8. DepressionEffexor     Recommendations:  1. Diagnosis: Left temporal mass with left facial droop, change in mental status, aphasia, GBS grade 4  2. Therapy:  30 feet contact-guard with 1 loss of balance requiring min assist, ADLs max assist lower extremities, cognitive issues  3. Medical  Necessity: As above  4. Support: Clarify, will need 24/7 due to cognitive deficitsnoted daughter was planning to move in however she has Covidclarify support has family wanted hospice  5. Rehab recommendation: would benefit from acute inpatient rehabilitation when medically ready, will need 24/7 eventual discharge,   6. DVT proph: Neptali Ortez MD       This note is created with the assistance of a speech recognition program.  While intending to generate a document that actually reflects the content of the visit, the document can still have some errors including those of syntax and sound a like substitutions which may escape proof reading.   In such instances, actual meaning can be extrapolated by contextual diversion

## 2020-12-21 NOTE — PROGRESS NOTES
Neurosurgery SUMAYA/Resident    Daily Progress Note   Chief Complaint   Patient presents with   Tabby See Cerebrovascular Accident     12/21/2020  11:45 AM    Chart reviewed. No acute events overnight. No new complaints. Vitals:    12/21/20 0400 12/21/20 0800 12/21/20 0815 12/21/20 1115   BP: 126/68 127/68 122/70    Pulse: 56 56 58 59   Resp: 17 11 15    Temp: 97.5 °F (36.4 °C) 97.4 °F (36.3 °C)     TempSrc: Oral Oral     SpO2: 94% 94% 96%    Weight: 200 lb (90.7 kg)      Height:           PE:   Awake and alert. Oriented to self. Expressive aphasia. Motor able to follow all commands and moving all extremities in full ROM    Sensation intact     Incision clean and dry      Lab Results   Component Value Date    WBC 9.3 12/21/2020    HGB 15.1 12/21/2020    HCT 46.6 12/21/2020     12/21/2020    HDL 37 (L) 12/09/2020    ALT 88 (H) 12/19/2020    AST 25 12/19/2020     (L) 12/21/2020    K 4.2 12/21/2020     12/21/2020    CREATININE 0.79 12/21/2020    BUN 26 (H) 12/21/2020    CO2 21 12/21/2020    TSH 2.76 12/09/2020    INR 1.0 12/09/2020    LABA1C 5.5 12/18/2020       A/P  66 y.o. male who presents with left frontotemporal high grade glioma  POD 7 s/p resection with 5ALA       - Patient's daughter Morales Martin is ok with patient making his own decision rehab vs hospice. Patient expressed his wish to go to rehab [facility]  - Case management will continue to work on placement      Please contact neurosurgery with any changes in patients neurologic status.        DALIA Srivastava   11:45 AM EST

## 2020-12-21 NOTE — PLAN OF CARE
Problem: Falls - Risk of:  Goal: Will remain free from falls  Description: Will remain free from falls  12/20/2020 2220 by Stan Malcolm RN  Outcome: Ongoing  12/20/2020 1817 by Katja Rivera RN  Outcome: Ongoing  Goal: Absence of physical injury  Description: Absence of physical injury  12/20/2020 2220 by Stan Malcolm RN  Outcome: Ongoing  12/20/2020 1817 by Katja Rivera RN  Outcome: Ongoing     Problem: Skin Integrity:  Goal: Will show no infection signs and symptoms  Description: Will show no infection signs and symptoms  12/20/2020 2220 by Stan Malcolm RN  Outcome: Ongoing  12/20/2020 1817 by Katja Rivera RN  Outcome: Ongoing  Goal: Absence of new skin breakdown  Description: Absence of new skin breakdown  12/20/2020 2220 by Stan Malcolm RN  Outcome: Ongoing  12/20/2020 1817 by Katja Rivera RN  Outcome: Ongoing

## 2020-12-21 NOTE — CARE COORDINATION
Transition planning  Pt's RN Talya informs writer she has spoken with pt's daughter Bo Brewer and she is ok with patient  making his own decisions regarding rehab vs. Hospice. Palliative care is in agreement as well. Writer speaks with patient and he states he wants to go to rehab to get better and go home. Given ARU choices and he chooses [facility].

## 2020-12-21 NOTE — PROGRESS NOTES
Notified RAFAEL Phillip CM, via vm that per Dr Oscar Hernandez pt is approp for ARU. Noted plan was for hospice, but requested notification if plan changes. Per Dr Oscar Hernandez pt will need 24/7 upon d/c d/t cognition issues.

## 2020-12-21 NOTE — DISCHARGE INSTR - COC
Continuity of Care Form    Patient Name: Mil Alejandra   :  1945  MRN:  [id number]    Admit date:  2020  Discharge date:  2020  Code Status Order: Full Code   Advance Directives:   [facility] Documentation       Date/Time Healthcare Directive Type of Healthcare Directive Copy in [facility] [address] Agent's Name Healthcare Agent's Phone Number    [phone]  Unknown, patient unable to respond due to medical condition -- -- -- -- --            Admitting Physician:  Maryann Hamilton MD  PCP: No primary care provider on file. Discharging Nurse: Nelson Raymond Unit/Room#: 1783/3867-86  Discharging Unit Phone Number: [phone]    Emergency Contact:   Extended Emergency Contact Information  Primary Emergency Contact: Mary Whitfield  Home Phone: [phone]  Mobile Phone: [phone]  Relation: Child  Secondary Emergency Contact: [place] [address], [address] [place] [address] Phone: [phone]  Mobile Phone: [phone]  Relation: Child    Past Surgical History:  Past Surgical History:   Procedure Laterality Date    CRANIOTOMY Left 2020    temporal for tumor    CRANIOTOMY Left 2020    TEMPORAL CRANIOTOMY FOR TUMOR, IMAGE GUIDED, AWAKE performed by León Sotelo DO at [place] [address]       Immunization History: There is no immunization history on file for this patient.     Active Problems:  Patient Active Problem List   Diagnosis Code    Brain mass G93.89    Acute encephalopathy G93.40    Cerebral edema (HCC) G93.6    Thrombocytopenia (HCC) D69.6    GBM (glioblastoma multiforme) (HCC) C71.9       Isolation/Infection:   Isolation            No Isolation          Patient Infection Status       None to display            Nurse Assessment:  Last Vital Signs: /70   Pulse 58   Temp 97.4 °F (36.3 °C) (Oral)   Resp 15   Ht 6' 2\" (1.88 m)   Wt 200 lb (90.7 kg)   SpO2 96%   BMI 25.68 kg/m²     Last documented pain score (0-10 scale): Pain Level: 0 Last Weight:   Wt Readings from Last 1 Encounters:   12/21/20 200 lb (90.7 kg)     Mental Status:  alert, coherent, logical and thought processes intact  Expressive aphasia    IV Access:  - None    Nursing Mobility/ADLs:  Walking   Independent  Transfer  Independent  Bathing  Independent  Dressing  Assisted  Toileting  Assisted  Feeding  [facility] Delivery   whole    Wound Care Documentation and Therapy:        Elimination:  Continence:   · Bowel: Yes  · Bladder: Yes  Urinary Catheter: None   Colostomy/Ileostomy/Ileal Conduit: No       Date of Last BM: unknown    Intake/Output Summary (Last 24 hours) at 12/21/2020 1033  Last data filed at 12/21/2020 0400  Gross per 24 hour   Intake 360 ml   Output 1075 ml   Net -715 ml     I/O last 3 completed shifts: In: 360 [P.O.:360]  Out: 1400 [Urine:1400]    Safety Concerns: At Risk for Falls    Impairments/Disabilities:      Expressive aphasia    Nutrition Therapy:  Current Nutrition Therapy:   - Oral Diet:  General    Routes of Feeding: Oral  Liquids: Thin Liquids  Daily Fluid Restriction: no  Last Modified Barium Swallow with Video (Video Swallowing Test): not done    Treatments at the Time of Hospital Discharge:   Respiratory Treatments: none  Oxygen Therapy:  is not on home oxygen therapy.   Ventilator:    - No ventilator support    Rehab Therapies: Physical Therapy, Occupational Therapy and Speech/Language Therapy  Weight Bearing Status/Restrictions: No weight bearing restirctions  Other Medical Equipment (for information only, NOT a DME order):  walker  Other Treatments: none    Patient's personal belongings (please select all that are sent with patient):  Glasses    RN SIGNATURE:  Electronically signed by Maria Esther Figueroa RN 12/28/2020 1301    CASE MANAGEMENT/SOCIAL WORK SECTION    Inpatient Status Date: 12/09/2020    Readmission Risk Assessment Score:  Readmission Risk              Risk of Unplanned Readmission:        19 Discharging to Facility/ [place] [address] of [facility] Details  FAX            [address] [facility] [address]       Phone: [phone]       Fax: [phone]        ·     Dialysis Facility (if applicable)   · Name:  · Address:  · Dialysis Schedule:  · Phone:  · Fax:    / signature: Electronically signed by Vincent RN on 12/28/20 at 12:10 PM EST    PHYSICIAN SECTION    Prognosis: Good    Condition at Discharge: Stable    Rehab Potential (if transferring to Rehab): Good    Recommended Labs or Other Treatments After Discharge:     Physician Certification: I certify the above information and transfer of Torsten Harris  is necessary for the continuing treatment of the diagnosis listed and that he requires Acute Rehab for less 30 days.      Update Admission H&P: No change in H&P    PHYSICIAN SIGNATURE:  Electronically signed by Gisell Novoa MD on 12/22/20 at 11:31 AM EST

## 2020-12-21 NOTE — PROGRESS NOTES
Physical Therapy  Facility/Department: [facility] NEURO  Daily Treatment Note  NAME: Erik Phillips  : 1945  MRN: [id number]    Date of Service: 2020    Discharge Recommendations:  Patient would benefit from continued therapy after discharge   PT Equipment Recommendations  Other: CTA    Assessment   Body structures, Functions, Activity limitations: Decreased functional mobility ; Decreased posture;Decreased endurance;Decreased ROM; Decreased strength;Decreased safe awareness;Decreased balance  Assessment: Pt ambulated ~40ft with RW, CGA. Pt requires increased time to complete all functional mobility. Pt limited by general weakness and SOB. Would benefit from continued PT to address deficits. Prognosis: Good  REQUIRES PT FOLLOW UP: Yes  Activity Tolerance  Activity Tolerance: Patient limited by fatigue;Patient limited by endurance; Patient limited by cognitive status     Patient Diagnosis(es): The primary encounter diagnosis was Brain mass. A diagnosis of [place] coma scale total score 3-8, in the field (EMT or ambulance) [facility]) was also pertinent to this visit. has no past medical history on file. has a past surgical history that includes craniotomy (Left, 2020) and craniotomy (Left, 2020). Restrictions  Restrictions/Precautions  Restrictions/Precautions: Fall Risk  Required Braces or Orthoses?: No  Position Activity Restriction  Other position/activity restrictions: Up with assist  Subjective   General  Chart Reviewed: Yes  Response To Previous Treatment: Patient with no complaints from previous session. Family / Caregiver Present: No  Subjective  Subjective: RN and pt agreeable to PT. Pt resting in bed upon arrival with no c/o pain.   General Comment  Comments: Pt left on commode with floor aide  Pain Screening  Patient Currently in Pain: Denies  Vital Signs  Patient Currently in Pain: Denies       Orientation  Orientation  Overall Orientation Status: Within Functional Limits Cognition      Objective   Bed mobility  Supine to Sit: Minimal assistance  Transfers  Sit to Stand: Minimal Assistance  Stand to sit: Minimal Assistance  Comment: used RW  Ambulation  Ambulation?: Yes  Ambulation 1  Surface: level tile  Device: Rolling Walker  Assistance: Contact guard assistance  Quality of Gait: slow unsteady no LOB  Gait Deviations: Slow Juliet;Decreased step length;Decreased step height  Distance: ~40'  Comments: increased time and effort to complete due to SOB and increased fatigue. cueing for sequencing t/o fair return. Stairs/Curb  Stairs?: No     Balance  Posture: Good  Sitting - Static: Good  Sitting - Dynamic: Good  Standing - Static: Fair;+  Standing - Dynamic: Fair;-  Comments: Standing balance assessed w/ RW; pt able to sit EOB SBA ~10 minutes  Exercises  Seated LE exercise program: Long Arc Quads, hip abduction/adduction, heel/toe raises, and marches. Reps: x15  Goals  Short term goals  Time Frame for Short term goals: 15  Short term goal 1: Pt to perform bed mobility CGA  Short term goal 2: Pt to demonstrate functional transfers CGA  Short term goal 3: Ambulate 100ft w/ least restrictive AD CGA  Short term goal 4: Demonstrate standing balance of good - to decrease fall risk  Patient Goals   Patient goals :  To go home    Plan    Plan  Times per week: 5-6x/week  Current Treatment Recommendations: Strengthening, Transfer Training, Endurance Training, Patient/Caregiver Education & Training, ROM, Balance Training, Gait Training, Home Exercise Program, Functional Mobility Training, Stair training, Safety Education & Training  Safety Devices  Type of devices: Gait belt, Nurse notified, Call light within reach, Patient at risk for falls, All fall risk precautions in place  Restraints  Initially in place: No     Therapy Time   Individual Concurrent Group Co-treatment   Time In 1450         Time Out 1513         Minutes 23         Timed Code Treatment Minutes: 23 Minutes Jordon Heck, PTA

## 2020-12-21 NOTE — PROGRESS NOTES
Neurology Resident Progress Note      SUBJECTIVE:  This is a 66 y.o.  male admitted 12/9/2020 for Brain mass [G93.89]  This is a follow-up neurology progress note. The patient was seen and examined and the chart was reviewed. There were no acute events overnight. ROS  Constitutional: no fever, chills, fatigue  HENT: No change in vision or hearing   Respiratory: No cough, SOB, wheezing. Cardiovascular:  No chest pain, palpitations, leg swelling. Gastrointestinal: No nausea, vomiting, diarrhea. Genitourinary: No increased frequency, urgency. Musculoskeletal: No myalgia or arthralgia. Skin: No rashes or scarring or bruises. Neurological: No headache, paresthesia, or focal weakness. Endo/Heme/Allergies: Negative for itchy eyes or runny nose. Psychiatric/Behavioral: No anxiety or depressed mood. HPI  See H&P     divalproex  500 mg Oral BID    venlafaxine  37.5 mg Oral Daily    carvedilol  3.125 mg Oral BID WC    sodium chloride flush  10 mL Intravenous 2 times per day    sennosides-docusate sodium  1 tablet Oral BID    enoxaparin  30 mg Subcutaneous BID    [Held by provider] potassium bicarb-citric acid  40 mEq Oral Daily    famotidine (PEPCID) injection  20 mg Intravenous BID    atorvastatin  40 mg Oral Nightly       History reviewed. No pertinent past medical history. Past Surgical History:   Procedure Laterality Date    CRANIOTOMY Left 12/14/2020    temporal for tumor    CRANIOTOMY Left 12/14/2020    TEMPORAL CRANIOTOMY FOR TUMOR, IMAGE GUIDED, AWAKE performed by Ger Fregoso DO at [address] [place] [address] Drive:      Blood pressure 126/68, pulse 56, temperature 97.5 °F (36.4 °C), temperature source Oral, resp. rate 17, height 6' 2\" (1.88 m), weight 200 lb (90.7 kg), SpO2 94 %. General Examination    General Resting comfortably in bed   Head Normocephalic, without obvious abnormality   Neck Supple, symmetrical. Good ROM. No midline or paraspinal tenderness. Lungs Respirations unlabored, no wheezing   Chest Wall No deformity   Heart RRR, no murmur   Abdomen Soft. Non-tender, non-distended   Extremities No cyanosis or edema or warmth.    Pulses 2+ and symmetric   Skin: Skin  turgor normal, no rashes or lesions     GENERAL   appears comfortable and in no distress   HEENT   NC/AT   NECK   supple and no bruit heard   MENTAL STATUS:  Alert and oriented, expressive aphasia and some dysarthria   CRANIAL NERVES:  Grossly intact   MOTOR FUNCTION:  In all extremities spontaneously and on command   SENSORY FUNCTION:   Withdraws all 4 extremities to stimulus, no loss of sensation   CEREBELLAR FUNCTION:  Intact fine motor control over upper limbs   REFLEX FUNCTION:  Symmetric, no perverted reflex, no Babinski sign   STATION and GAIT  Not tested             Investigations:      Laboratory Testing:  Recent Results (from the past 24 hour(s))   Basic Metabolic Panel    Collection Time: 12/21/20  4:39 AM   Result Value Ref Range    Glucose 103 (H) 70 - 99 mg/dL    BUN 26 (H) 8 - 23 mg/dL    CREATININE 0.79 0.70 - 1.20 mg/dL    Bun/Cre Ratio NOT REPORTED 9 - 20    Calcium 8.4 (L) 8.6 - 10.4 mg/dL    Sodium 132 (L) 135 - 144 mmol/L    Potassium 4.2 3.7 - 5.3 mmol/L    Chloride 102 98 - 107 mmol/L    CO2 21 20 - 31 mmol/L    Anion Gap 9 9 - 17 mmol/L    GFR Non-African American >60 >60 mL/min    GFR African American >60 >60 mL/min    GFR Comment          GFR Staging NOT REPORTED    CBC    Collection Time: 12/21/20  4:39 AM   Result Value Ref Range    WBC 9.3 3.5 - 11.3 k/uL    RBC 4.86 4.21 - 5.77 m/uL    Hemoglobin 15.1 13.0 - 17.0 g/dL    Hematocrit 46.6 40.7 - 50.3 %    MCV 95.9 82.6 - 102.9 fL    MCH 31.1 25.2 - 33.5 pg    MCHC 32.4 28.4 - 34.8 g/dL    RDW 13.0 11.8 - 14.4 %    Platelets 990 035 - 587 k/uL    MPV 9.8 8.1 - 13.5 fL    NRBC Automated 0.0 0.0 per 100 WBC   PHOSPHORUS    Collection Time: 12/21/20  4:39 AM   Result Value Ref Range    Phosphorus 3.0 2.5 - 4.5 mg/dL CALCIUM, IONIZED    Collection Time: 12/21/20  4:39 AM   Result Value Ref Range    Calcium, Ion 1.10 (L) 1.13 - 1.33 mmol/L       Imaging/Diagnostics:  Echo Complete 2d W Doppler W Color    Result Date: 12/12/2020 Transthoracic Echocardiography Report (TTE)  Patient Name [facility]      Date of Study               12/11/2020               Nadja Letters   Date of      1945  Gender                      Male  Birth   Age          66 year(s)  Race                           Room Number  0690        Height:                     74 inch, 187.96 cm   Corporate ID [id number]    Weight:                     200 pounds, 90.7 kg  #   Patient Acct [de-identified]   BSA:          2.17 m^2      BMI:      25.68  #                                                              kg/m^2   MR #         [id number]     Kleberg Batman   Accession #  [id number]  Interpreting Physician      Ruby Jurado   Fellow                   Referring Nurse                           Practitioner   Interpreting             Referring Physician         Nestor Ewing MD  Fellow  Type of Study   TTE procedure:2D Echocardiogram, M-Mode, Doppler, Color Doppler, Bubble  Study. Procedure Date Date: 12/11/2020 Start: 11:21 AM Study Location: [facility] Technical Quality: Good visualization History / Tech. Comments: Procedure explained to patient. Study done bedside in Neuro ICU. Altered mental status, brain mass Patient Status: Inpatient Height: 74 inches Weight: 200 pounds BSA: 2.17 m^2 BMI: 25.68 kg/m^2 CONCLUSIONS Summary Left ventricle is normal in size. Global left ventricular systolic function is normal. Estimated ejection fraction is 55 % . Normal left ventricular wall thickness. No obvious wall motion abnormality seen.  Signature ----------------------------------------------------------------------------  Electronically signed by Pastor Pinzon) on 12/11/2020  01:14 PM ---------------------------------------------------------------------------- ----------------------------------------------------------------------------  Electronically signed by Ruby Jurado(Interpreting physician) on 1.03 m/s  Peak E-Wave: 0.57 m/s                   Mean Velocity: 0.66 m/s  Peak A-Wave: 0.82 m/s                   Peak Gradient: 4.24 mmHg  E/A Ratio: 0.69                         Mean Gradient: 3 mmHg  Peak Gradient: 1.28 mmHg  Mean Gradient: 2 mmHg  Deceleration Time: 197 msec             Area (continuity): 3.05 cm^2  P1/2t: 59 msec                          AV VTI: 22.6 cm   Area (continuity): 3.03 cm^2  Mean Velocity: 0.56 m/s   Tricuspid:                              Pulmonic:   Estimated RVSP: 27 mmHg                 Peak Velocity: 1.03 m/s  Peak TR Velocity: 2.04 m/s              Peak Gradient: 4.24 mmHg  Peak TR Gradient: 16.6464 mmHg  Estimated RA Pressure: 10 mmHg                                           Estimated PASP: 26.65 mmHg  Diastology / Tissue Doppler Septal Wall E' velocity:0.05 m/s Septal Wall E/E':12.4 Lateral Wall E' velocity:0.11 m/s Lateral Wall E/E':5.3    Ct Head Wo Contrast    Result Date: 12/14/2020 EXAMINATION: CT OF THE HEAD WITHOUT CONTRAST  12/14/2020 2:30 pm TECHNIQUE: CT of the head was performed without the administration of intravenous contrast. Dose modulation, iterative reconstruction, and/or weight based adjustment of the mA/kV was utilized to reduce the radiation dose to as low as reasonably achievable. COMPARISON: None. HISTORY: ORDERING SYSTEM PROVIDED HISTORY: s/p tumor resection TECHNOLOGIST PROVIDED HISTORY: Please complete before leaving pacu and transfer patient with blanket over face. Patient must be kept in dark room 48 hrs post op. Please complete CTH with blanket over face. s/p tumor resection Reason for Exam: post op FINDINGS: BRAIN/VENTRICLES: Extensive bifrontal pneumocephaly  and also  along the left temporal parietal region. Postsurgical changes in the left temporoparietal lobe with mild residual extra-axial hemorrhage and intra-axial edema. Mild midline shift from left to right. Left temporoparietal craniotomy defects. Chronic right ethmoid and sphenoid sinus disease. [ Status post left craniotomy with postsurgical changes in the left temporoparietal lobe with residual edema, pneumocephaly  and mild extra-axial hemorrhage.      Ct Head Wo Contrast    Result Date: 12/9/2020 EXAMINATION: CT OF THE HEAD WITHOUT CONTRAST; CTA OF THE HEAD AND NECK WITH CONTRAST 12/9/2020 1:46 pm; 12/9/2020 1:59 pm: TECHNIQUE: CT of the head was performed without the administration of intravenous contrast. Dose modulation, iterative reconstruction, and/or weight based adjustment of the mA/kV was utilized to reduce the radiation dose to as low as reasonably achievable.; CTA of the head and neck was performed with the administration of intravenous contrast. Multiplanar reformatted images are provided for review. MIP images are provided for review. Stenosis of the internal carotid arteries measured using NASCET criteria. Dose modulation, iterative reconstruction, and/or weight based adjustment of the mA/kV was utilized to reduce the radiation dose to as low as reasonably achievable. Noncontrast CT of the head with reconstructed 2-D images are also provided for review. COMPARISON: None. HISTORY: ORDERING SYSTEM PROVIDED HISTORY: ams stroke TECHNOLOGIST PROVIDED HISTORY: [facility] stroke Reason for Exam: ams Acuity: Acute Type of Exam: Initial; ORDERING SYSTEM PROVIDED HISTORY: stroke TECHNOLOGIST PROVIDED HISTORY: stroke Reason for Exam: ams Acuity: Acute Type of Exam: Initial FINDINGS: CT HEAD: BRAIN/VENTRICLES:  No acute intracranial hemorrhage or extraaxial fluid collection. Grey-white differentiation is maintained. No hydrocephalus is evident. There is a 3 cm mass within the posterior left temporal lobe with surrounding vasogenic edema. Slight midline shift from left to right is identified. ORBITS: The visualized portion of the orbits demonstrate no acute abnormality. SINUSES:  There is opacification of the right posterior ethmoid air cells and right sphenoid sinus. The mastoid air cells are well pneumatized SOFT TISSUES/SKULL: No acute abnormality of the visualized skull or soft tissues. CTA NECK: AORTIC ARCH/ARCH VESSELS: No dissection or arterial injury.   No significant stenosis of the brachiocephalic or subclavian arteries. CAROTID ARTERIES: No dissection, arterial injury, or hemodynamically significant stenosis by NASCET criteria. VERTEBRAL ARTERIES: No dissection, arterial injury, or significant stenosis. SOFT TISSUES: The lung apices are clear. No cervical or superior mediastinal lymphadenopathy. The larynx and pharynx are unremarkable. No acute abnormality of the salivary and thyroid glands. BONES: No acute osseous abnormality. CTA HEAD: ANTERIOR CIRCULATION: No significant stenosis of the intracranial internal carotid, anterior cerebral, or middle cerebral arteries. No aneurysm. POSTERIOR CIRCULATION: No significant stenosis of the vertebral, basilar, or posterior cerebral arteries. No aneurysm. OTHER: No dural venous sinus thrombosis on this non-dedicated study     1. Noncontrast CT of the head demonstrates presence of a 3 cm left posterior temporal lobe mass with surrounding vasogenic edema. Minimal midline shift from left to right is noted. 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 MRI of the brain with contrast is recommended. 2. Unremarkable CTA of the head and neck. The findings were sent to the Radiology Results [id number] at 2:15 pm on 12/9/2020to be communicated to a licensed caregiver.      Ct Cervical Spine Wo Contrast    Result Date: 12/9/2020 EXAMINATION: CT OF THE CERVICAL SPINE WITHOUT CONTRAST 12/9/2020 4:12 pm TECHNIQUE: CT of the cervical spine was performed without the administration of intravenous contrast. Multiplanar reformatted images are provided for review. Dose modulation, iterative reconstruction, and/or weight based adjustment of the mA/kV was utilized to reduce the radiation dose to as low as reasonably achievable. COMPARISON: None. HISTORY: ORDERING SYSTEM PROVIDED HISTORY: weakness, possible fall TECHNOLOGIST PROVIDED HISTORY: weakness, possible fall FINDINGS: BONES/ALIGNMENT: There is no acute fracture or traumatic malalignment. DEGENERATIVE CHANGES: Multilevel degenerative changes SOFT TISSUES: There is no prevertebral soft tissue swelling. No acute abnormality of the cervical spine. Xr Chest Portable    Result Date: 12/9/2020  EXAMINATION: ONE XRAY VIEW OF THE CHEST 12/9/2020 3:47 pm COMPARISON: None. HISTORY: ORDERING SYSTEM PROVIDED HISTORY: Altered mental status TECHNOLOGIST PROVIDED HISTORY: Altered mental status Reason for Exam: supine Acuity: Unknown Type of Exam: Unknown FINDINGS: Cardiomediastinal silhouette is upper limits of normal in size. No pulmonary consolidation, pleural effusion, or pneumothorax. No acute osseous abnormality. No acute cardiopulmonary abnormality.      Vl Dup Lower Extremity Venous Bilateral    Result Date: 12/17/2020 !None      ! +------------------------------------+----------+---------------+----------+ ! Sapheno Femoral Junction            ! Yes       ! Yes            ! None      ! +------------------------------------+----------+---------------+----------+ ! PTV                                 ! Yes       ! Yes            ! None      ! +------------------------------------+----------+---------------+----------+ ! Peroneal                            !Partial   !Yes            ! None      ! +------------------------------------+----------+---------------+----------+ ! Gastroc                             ! Yes       ! Yes            ! None      ! +------------------------------------+----------+---------------+----------+ ! GSV Thigh                           ! Yes       ! Yes            ! None      ! +------------------------------------+----------+---------------+----------+ ! GSV Knee                            ! Yes       ! Yes            ! None      ! +------------------------------------+----------+---------------+----------+ ! GSV Ankle                           ! Yes       ! Yes            ! None      ! +------------------------------------+----------+---------------+----------+ ! SSV                                 ! Yes       ! Yes            ! None      ! +------------------------------------+----------+---------------+----------+ Right Doppler Measurements +---------------------------+------+------+--------------------------------+ ! Location                   ! Signal!Reflux! Reflux (msec)                   ! +---------------------------+------+------+--------------------------------+ ! Common Femoral             !Phasic!      !                                ! +---------------------------+------+------+--------------------------------+ ! Prox Femoral               !Phasic!      !                                ! +---------------------------+------+------+--------------------------------+ ! Popliteal                  !Phasic!      ! ! +---------------------------+------+------+--------------------------------+ Left Lower Extremities DVT Study Measurements Left 2D Measurements +------------------------------------+----------+---------------+----------+ ! Location                            ! Visualized! Compressibility! Thrombosis! +------------------------------------+----------+---------------+----------+ ! Common Femoral                      !Yes       ! Yes            ! None      ! +------------------------------------+----------+---------------+----------+ ! Prox Femoral                        !Yes       ! Yes            ! None      ! +------------------------------------+----------+---------------+----------+ ! Mid Femoral                         !Yes       ! Yes            ! None      ! +------------------------------------+----------+---------------+----------+ ! Dist Femoral                        !Yes       ! Yes            ! None      ! +------------------------------------+----------+---------------+----------+ ! Deep Femoral                        !Yes       ! Yes            ! None      ! +------------------------------------+----------+---------------+----------+ ! Popliteal                           !Yes       ! Yes            ! None      ! +------------------------------------+----------+---------------+----------+ ! Sapheno Femoral Junction            ! Yes       ! Yes            ! None      ! +------------------------------------+----------+---------------+----------+ ! PTV                                 ! Partial   !Yes            ! None      ! +------------------------------------+----------+---------------+----------+ ! Peroneal                            !Partial   !Yes            ! None      ! +------------------------------------+----------+---------------+----------+ ! Gastroc                             ! Yes       ! Yes            ! None      ! +------------------------------------+----------+---------------+----------+ ! GSV Thigh !Yes       !Yes            ! None      ! +------------------------------------+----------+---------------+----------+ ! GSV Knee                            ! Yes       ! Yes            ! None      ! +------------------------------------+----------+---------------+----------+ ! GSV Ankle                           ! Yes       ! Yes            ! None      ! +------------------------------------+----------+---------------+----------+ ! SSV                                 ! Partial   !Yes            ! None      ! +------------------------------------+----------+---------------+----------+ Left Doppler Measurements +---------------------------+------+------+--------------------------------+ ! Location                   ! Signal!Reflux! Reflux (msec)                   ! +---------------------------+------+------+--------------------------------+ ! Common Femoral             !Phasic!      !                                ! +---------------------------+------+------+--------------------------------+ ! Prox Femoral               !Phasic!      !                                ! +---------------------------+------+------+--------------------------------+ ! Popliteal                  !Phasic!      !                                ! +---------------------------+------+------+--------------------------------+    Ct Brain Perfusion    Result Date: 12/9/2020 EXAMINATION: CT OF THE HEAD WITH CONTRAST WITH PERFUSION 12/9/2020 3:19 pm: TECHNIQUE: CT of the head/brain was performed with the administration of intravenous contrast. Multiplanar reformatted images are provided for review. MIP images are provided for review. Dose modulation, iterative reconstruction, and/or weight based adjustment of the mA/kV was utilized to reduce the radiation dose to as low as reasonably achievable. COMPARISON: CTA head and CT head from today HISTORY: ORDERING SYSTEM PROVIDED HISTORY: stroke TECHNOLOGIST PROVIDED HISTORY: stroke FINDINGS: CT PERFUSION: There is symmetric perfusion to the brain without a perfusion mismatch. There is ring-like matching increased cerebral blood volume, cerebral blood flow, and mean transit time in the left temporal lobe mass. 1. No perfusion mismatch. 2. Left temporal lobe mass demonstrates matching increased cerebral blood flow cerebral blood volume and mean transit time.      Cta Head Neck W Contrast    Result Date: 12/9/2020 EXAMINATION: CT OF THE HEAD WITHOUT CONTRAST; CTA OF THE HEAD AND NECK WITH CONTRAST 12/9/2020 1:46 pm; 12/9/2020 1:59 pm: TECHNIQUE: CT of the head was performed without the administration of intravenous contrast. Dose modulation, iterative reconstruction, and/or weight based adjustment of the mA/kV was utilized to reduce the radiation dose to as low as reasonably achievable.; CTA of the head and neck was performed with the administration of intravenous contrast. Multiplanar reformatted images are provided for review. MIP images are provided for review. Stenosis of the internal carotid arteries measured using NASCET criteria. Dose modulation, iterative reconstruction, and/or weight based adjustment of the mA/kV was utilized to reduce the radiation dose to as low as reasonably achievable. Noncontrast CT of the head with reconstructed 2-D images are also provided for review. COMPARISON: None. HISTORY: ORDERING SYSTEM PROVIDED HISTORY: ams stroke TECHNOLOGIST PROVIDED HISTORY: [facility] stroke Reason for Exam: ams Acuity: Acute Type of Exam: Initial; ORDERING SYSTEM PROVIDED HISTORY: stroke TECHNOLOGIST PROVIDED HISTORY: stroke Reason for Exam: ams Acuity: Acute Type of Exam: Initial FINDINGS: CT HEAD: BRAIN/VENTRICLES:  No acute intracranial hemorrhage or extraaxial fluid collection. Grey-white differentiation is maintained. No hydrocephalus is evident. There is a 3 cm mass within the posterior left temporal lobe with surrounding vasogenic edema. Slight midline shift from left to right is identified. ORBITS: The visualized portion of the orbits demonstrate no acute abnormality. SINUSES:  There is opacification of the right posterior ethmoid air cells and right sphenoid sinus. The mastoid air cells are well pneumatized SOFT TISSUES/SKULL: No acute abnormality of the visualized skull or soft tissues. CTA NECK: AORTIC ARCH/ARCH VESSELS: No dissection or arterial injury.   No significant stenosis of the brachiocephalic or subclavian arteries. CAROTID ARTERIES: No dissection, arterial injury, or hemodynamically significant stenosis by NASCET criteria. VERTEBRAL ARTERIES: No dissection, arterial injury, or significant stenosis. SOFT TISSUES: The lung apices are clear. No cervical or superior mediastinal lymphadenopathy. The larynx and pharynx are unremarkable. No acute abnormality of the salivary and thyroid glands. BONES: No acute osseous abnormality. CTA HEAD: ANTERIOR CIRCULATION: No significant stenosis of the intracranial internal carotid, anterior cerebral, or middle cerebral arteries. No aneurysm. POSTERIOR CIRCULATION: No significant stenosis of the vertebral, basilar, or posterior cerebral arteries. No aneurysm. OTHER: No dural venous sinus thrombosis on this non-dedicated study     1. Noncontrast CT of the head demonstrates presence of a 3 cm left posterior temporal lobe mass with surrounding vasogenic edema. Minimal midline shift from left to right is noted. 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 MRI of the brain with contrast is recommended. 2. Unremarkable CTA of the head and neck. The findings were sent to the Radiology Results [id number] at 2:15 pm on 12/9/2020to be communicated to a licensed caregiver.      Ct Chest Abdomen Pelvis Wo Contrast    Result Date: 12/9/2020 EXAMINATION: MRI OF THE BRAIN WITHOUT AND WITH CONTRAST  12/15/2020 4:06 pm TECHNIQUE: Multiplanar multisequence MRI of the head/brain was performed without and with the administration of intravenous contrast. COMPARISON: MRI brain 12/09/2020. HISTORY: ORDERING SYSTEM PROVIDED HISTORY: s/p tumor resection TECHNOLOGIST PROVIDED HISTORY: Patient needs to be in dark for 48 hours postop. Please obtain with blanket over face. s/p tumor resection Reason for Exam: s/p tumor resection FINDINGS: INTRACRANIAL STRUCTURES/VENTRICLES: No evidence of acute stroke. There is abnormal restricted diffusion identified within the surgical cavity likely related to blood products. There is T2 prolongation and diffusion hyperintensity identified extending along the splenium corpus callosum likely due to the known mass. The postsurgical changes status post resection of/debulking of the left temporal lobe mass. There are blood products and proteinaceous debris identified along the resection cavity. Blood products extend into the occipital horn left lateral ventricle with subependymal hemosiderin. There is surrounding T2 prolongation identified within left temporal lobe extending along the splenium of the corpus callosum. This does not appear to cross the midline. There is abnormal T2 prolongation identified involving the posterior limb internal capsule and the posterior subinsular region. Intrinsic T1 shortening identified along the resection cavity appears to match the areas of postcontrast enhancement, however there appears to be subtle areas of subependymal enhancement left peritrigonal region left lateral ventricle just superior to the body of the occipital horn. No definite nodular enhancement identified within resection cavity. There is left-to-right midline shift measuring 7 mm. Sulcal effacement identified left temporal lobe.   There is abnormal signal identified overlying both frontal convexities due to the pneumocephalus related to recent surgery. There is a left frontal parietal and temporal extra-axial collection corresponding to the recent left-sided craniotomy. This likely corresponds to the postoperative hematoma. ORBITS: The visualized portion of the orbits demonstrate no acute abnormality. SINUSES: Mild paranasal sinus mucosal thickening. BONES/SOFT TISSUES: Postsurgical changes left temporal region. Expected postoperative findings status left temporal craniotomy with T2 prolongation identified within left temporal lobe extending along splenium corpus callosum worrisome for possibility of underlying infiltrative tumor. Additionally there is evidence of questionable subependymal enhancement which could be related to recent surgery/postoperative proteinaceous debris/blood products versus early subependymal spread of tumor. There is evidence of intrinsic T1 shortening identified on operative bed consistent with postop blood and proteinaceous debris postoperative changes without convincing evidence of enhancing nodularity within the resection cavity. Postoperative extra-axial fluid collection identified on left. 7 mm of left-to-right midline shift. Continued surveillance is recommended given the above findings.      Mri Brain W Wo Contrast    Result Date: 12/9/2020 EXAMINATION: MRI OF THE BRAIN WITHOUT AND WITH CONTRAST  12/9/2020 6:23 pm TECHNIQUE: Multiplanar multisequence MRI of the head/brain was performed without and with the administration of intravenous contrast. COMPARISON: CT brain from today HISTORY: ORDERING SYSTEM PROVIDED HISTORY: stroke/seizure TECHNOLOGIST PROVIDED HISTORY: stroke/seizure Reason for Exam: stroke/seizure, eval for tumor FINDINGS: INTRACRANIAL STRUCTURES/VENTRICLES:  There is no acute infarct. There is a intra-axial left temporal mass with peripheral enhancement measuring 32 x 23 mm in greatest transverse dimensions. There is local edema and 3-4 mm midline shift. There is loss of signal on T2 star. There appears to be increased and decreased diffusion. The ventricles and sulci are normal in size and configuration. The sellar/suprasellar regions appear unremarkable. The normal signal voids within the major intracranial vessels appear maintained. No abnormal focus of enhancement is seen within the brain. ORBITS: The visualized portion of the orbits demonstrate no acute abnormality. SINUSES: The visualized paranasal sinuses and mastoid air cells are well aerated. BONES/SOFT TISSUES: The bone marrow signal intensity appears normal. The soft tissues demonstrate no acute abnormality. Ring-enhancing left temporal lobe mass consistent with abscess versus primary or secondary neoplasm. Local edema and slight midline shift. Mixed cytotoxic and vasogenic edema. Microscopic hemorrhage.      Ct Lumbar Spine Trauma Reconstruction    Result Date: 12/9/2020 EXAMINATION: CT OF THE THORACIC SPINE WITHOUT CONTRAST; CT OF THE LUMBAR SPINE WITHOUT CONTRAST  12/9/2020 TECHNIQUE: CT of the thoracic spine was performed without the administration of intravenous contrast. Multiplanar reformatted images are provided for review. Dose modulation, iterative reconstruction, and/or weight based adjustment of the mA/kV was utilized to reduce the radiation dose to as low as reasonably achievable.; CT of the lumbar spine was performed without the administration of intravenous contrast. Multiplanar reformatted images are provided for review. Dose modulation, iterative reconstruction, and/or weight based adjustment of the mA/kV was utilized to reduce the radiation dose to as low as reasonably achievable. COMPARISON: None HISTORY: ORDERING SYSTEM PROVIDED HISTORY: BLE weakness TECHNOLOGIST PROVIDED HISTORY: BLE weakness; ORDERING SYSTEM PROVIDED HISTORY: LOWER EXTREMITY WEAKNESS TECHNOLOGIST PROVIDED HISTORY: BLE weakness FINDINGS: CT thoracic spine: BONES/ALIGNMENT: There is normal alignment of the spine. The vertebral body heights are maintained. No osseous destructive lesion is seen. DEGENERATIVE CHANGES: Diffuse mild degenerative and degenerative disc changes are noted. No gross spinal canal stenosis or bony neural foraminal narrowing of the thoracic spine. SOFT TISSUES: No paraspinal mass is seen. CT lumbar spine: BONES/ALIGNMENT: A 6 mm grade 1 anterolisthesis of L5 upon S1 is noted with bilateral nonacute L5 spondylolysis. Mild grade 1 retrolisthesis L4 upon L5 is present. No acute fracture is evident. DEGENERATIVE CHANGES: Mild-to-moderate degenerative and degenerative disc changes are present most significant at L2-L3, L3-L4 and L5-S1 with vacuum phenomenon. Multilevel disc protrusions are noted with disc protrusions noted L2-L3 through L5-S1. Exiting nerve roots at L2-L3 and L3-L4 elevated but not impinged.   Exiting nerve roots are elevated and appear borderline impinged at L5-S1. No gross bony canal stenosis is evident. SOFT TISSUES/RETROPERITONEUM: No paraspinal mass is seen. CT thoracic spine: Degenerative and degenerative disc disease. No acute fracture or traumatic malalignment. CT lumbar spine: No acute fracture. Patient has an anterolisthesis L5 upon S1 and bilateral chronic spondylosis. Multilevel degenerative and degenerative disc changes are noted as above. Multilevel disc bulges. Exiting L5-S1 nerve roots are elevated and borderline impinged.      Ct Thoracic Spine Trauma Reconstruction    Result Date: 12/9/2020 L5-S1. No gross bony canal stenosis is evident. SOFT TISSUES/RETROPERITONEUM: No paraspinal mass is seen. CT thoracic spine: Degenerative and degenerative disc disease. No acute fracture or traumatic malalignment. CT lumbar spine: No acute fracture. Patient has an anterolisthesis L5 upon S1 and bilateral chronic spondylosis. Multilevel degenerative and degenerative disc changes are noted as above. Multilevel disc bulges. Exiting L5-S1 nerve roots are elevated and borderline impinged.        Assessment & Differential Dx:      Primary Problem  <principal problem not specified>    Active Hospital Problems    Diagnosis Date Noted    Thrombocytopenia ([facility] [address] [D69.6]     GBM (glioblastoma multiforme) (HCC) [C71.9]     Acute encephalopathy [G93.40]     Cerebral edema (HCC) [G93.6]     Brain mass [G93.89] 12/09/2020       Case of 77Y old, presented initially for AMS, less responsive, brought to the hospital with NIH of 20, aphasia, patient was found to have left posterior temporal lobe mass with vasogenic edema underwent resection of underlying mass, consistent with GBM,  Expressive aphasia/dysarthria: Improved    Impression:  -Left temporal GBM  -Essential hypertension  -Mild thrombocytopenia    Plan:     -Heme oncology: Consulted  -Decadron per neurosurgery  -Seizure prophylaxis with Depakote 500 mg twice daily and Effexor  -Seizure precautions  -Consult psych for patient's competency  -Palliative to be consulted  -Disposition decision with family SNF versus hospice  -Patient is medically stable for discharge  -Family meeting today  -[facility] hospice accepted the patient      Roque Cuevas MD, 12/21/2020 8:36 AM

## 2020-12-21 NOTE — PROGRESS NOTES
Physical Therapy  DATE: 2020    NAME: Lawanda Stephenson  MRN: [id number]   : 1945    Patient not seen this date for Physical Therapy due to:  [] Blood transfusion in progress  [] Hemodialysis  [x] Patient Declined  Pt usually agreeable but refused despite max encouragement. Pt requested writer to return at a later time.  [] Spine Precautions   [] Strict Bedrest  [] Surgery/ Procedure  [] Testing      [] Other        [] PT is being discontinued at this time. Patient independent. No further needs. [] PT is being discontinued at this time due to declining physical/ medical status. Therapy is not appropriate at this time.     Nic Gross, PTA

## 2020-12-21 NOTE — PROGRESS NOTES
Speech Language Pathology  Speech Language Pathology  [facility]    Speech Language Treatment Note    Date: 12/21/2020  Patients Name: Anjelica Borjas  MRN: [id number]  Diagnosis:   Patient Active Problem List   Diagnosis Code    Brain mass G93.89    Acute encephalopathy G93.40    Cerebral edema (HCC) G93.6    Thrombocytopenia (HCC) D69.6    GBM (glioblastoma multiforme) ([facility] [address] C71.9       Pain: 0/10    Speech and Language Treatment  Treatment time: 6638-2543    Subjective: [x] Alert [x] Cooperative     [] Confused     [] Agitated    [] Lethargic    Objective/Assessment:  Auditory Comprehension:   1-step commands: 5/5 independently (pt required occasional repetitions)  2-step commands: 2/4 increased to 4/4 with min verbal and visual cues and repetition of instruction. Verbal Expression:   Object Naming: 3/6 with mild cues  increased to 6/6 with max verbal cues   [facility] Questions: 3/6, increased to 5/6 with max verbal and redirection  Biographical information: Pt oriented to first and last name and age independently. Reporting he wants to go to rehab and then go home. Responsive Naming 3/6 increased to 5/6 with max cues    Pt. With appropriate simple conversational responses with paraphasic errors noted at times. Often states he cannot think of the word. Education provided re: circumlocution. Decreased comprehension noted. Other: Pt pleasant and cooperative throughout session and expressed motivation to improve speech and language skills. Plan:  [x] Continue ST services    [] Discharge from ST:      Discharge recommendations: Further therapy recommended at discharge. Treatment completed by:  Regis Corona M.S. CF-SLP

## 2020-12-21 NOTE — PROGRESS NOTES
Writer spoke with Tabby and Robinson Eugene. They both want Jeferson Grissom to make his own decisions.   Jeferson Grissom wants to go to rehab then home and resume treatment and be a DNRCC-arrest. No intubation or CPR

## 2020-12-21 NOTE — CONSULTS
..    Palliative Care Inpatient Consult    NAME:  Janeth Munoz  MEDICAL RECORD NUMBER:  [id number]  AGE: 66 y.o. GENDER: male  : 1945  TODAY'S DATE:  2020    Reasons for Consultation:    Symptom and/or pain management  Provision of information regarding PC and/or hospice philosophies  Complex, time-intensive communication and interdisciplinary psychosocial support  Clarification of goals of care and/or assistance with difficult decision-making  Guidance in regards to resources and transition(s)    Members of PC team contributing to this consultation are :  Israel Lindsey, Palliative Care  History of Present Illness     The patient is a 66 y.o.   Non-/non  male who presents with Cerebrovascular Accident      Referred to Palliative Care by   [x] Physician   [] Nursing  [] Family Request   [] Other: ring-enhancing left temporal lobe mass consistent with abscess versus primary or secondary neoplasm. Local edema and slight midline shift noted. Mixed cytotoxic and vasogenic edema. Microscopic hemorrhage. Mx consults including Neuro Sx, Neuro, and cardiology. Patient was loaded with Keppra in ER. Cardiology cleared patient for surgery. Echo revealed 55% EF. Patient underwent surgical excision/craniotomy on 12/14. Pathology returned and Oncology consulted for grade 4 glioblastoma multiforme. Oncology has discussed options of chemoradiation therapy after rehab. It was noted that patient is wanting to seek treatment, but daughter/HCPOA reported wanting hospice care. Palliative care was consulted for review of goals, code status, and for support. 12/21 pertinent labs include; Bun 26, Na 132, ca 8.4, and calcium ionized is 1. 10. Active Hospital Problems    Diagnosis Date Noted    Thrombocytopenia ([facility] [address] [D69.6]     GBM (glioblastoma multiforme) (HCC) [C71.9]     Acute encephalopathy [G93.40]     Cerebral edema ([address] [G93.6]     Brain mass [G93.89] 12/09/2020       PAST MEDICAL HISTORY  History reviewed. No pertinent past medical history.     PAST SURGICAL HISTORY  Past Surgical History:   Procedure Laterality Date    CRANIOTOMY Left 12/14/2020    temporal for tumor    CRANIOTOMY Left 12/14/2020    TEMPORAL CRANIOTOMY FOR TUMOR, IMAGE GUIDED, AWAKE performed by Pablo Valdez DO at [address] [place] [address] History     Tobacco Use    Smoking status: Unknown If Ever Smoked   Substance Use Topics    Alcohol use: Not on file     Comment: SAUNDRA     Drug use: Not on file       ALLERGIES  No Known Allergies      MEDICATIONS  Current Medications    divalproex  500 mg Oral BID    venlafaxine  37.5 mg Oral Daily    carvedilol  3.125 mg Oral BID WC    sodium chloride flush  10 mL Intravenous 2 times per day    sennosides-docusate sodium  1 tablet Oral BID    enoxaparin  30 mg Subcutaneous BID  [Held by provider] potassium bicarb-citric acid  40 mEq Oral Daily    famotidine (PEPCID) injection  20 mg Intravenous BID    atorvastatin  40 mg Oral Nightly     labetalol, sodium chloride flush, sodium chloride flush, acetaminophen, oxyCODONE **OR** oxyCODONE, morphine **OR** morphine, magnesium hydroxide, bisacodyl, promethazine **OR** ondansetron, glucose, dextrose, glucagon (rDNA), dextrose, sodium chloride flush  IV Drips/Infusions   dextrose       Home Medications  No current facility-administered medications on file prior to encounter. No current outpatient medications on file prior to encounter. Data         /70   Pulse 58   Temp 97.5 °F (36.4 °C) (Oral)   Resp 15   Ht 6' 2\" (1.88 m)   Wt 200 lb (90.7 kg)   SpO2 96%   BMI 25.68 kg/m²     Wt Readings from Last 3 Encounters:   12/21/20 200 lb (90.7 kg)        Code Status: Full Code     ADVANCED CARE PLANNING:  Patient has capacity for medical decisions: yes   [address] of : yes - daughter Alysha Gamez Primary agent and Angela first alternate. Living Will: yes     Personal, Social, and Family History  Marital Status:   Living situation:alone  Importance of sabra/Muslim/spiritual beliefs: [] Very [] Somewhat [] Not   Psychological Distress: mild  Does patient understand diagnosis/treatment? yes  Does caregiver understand diagnosis/treatment?  yes      Assessment        REVIEW OF SYSTEMS  Constitutional: no fever, no chills or weight loss  Eyes: no eye pain or blurred vision  ENT: no hearing loss, congestion, or difficulty swallowing   Respiratory: no wheezing, chest tightness, or shortness of breath   Cardiovascular: no chest pain or pressure, no palpitations, no diaphoresis   Gastrointestinal: no nausea, vomiting,abdominal pain, diarrhea or constipation, no melena   Genitourinary: no dysuria, frequency,hematuria , or nocturia   Musculoskeletal: no myalgias or arthralgias, no back pain Skin: no rashes or sores   Neurological: no focal weakness, numbness, tingling, or headache, no seizures +dysarthia    PHYSICAL ASSESSMENT:  Constitutional: Alert and oriented to person, place, and time. (Patient reported to be in hospital [place] but could not give me name of facility). Patient reports month to be December and [place] as the upcoming [address]. Head: Normocephalic and atraumatic. Eyes: EOM are normal. Pupils are equal, round   Neck: Normal range of motion. Neck supple. No tracheal deviation present. Cardiovascular: Normal rate and regular rhythm, S1, S2, no murmur   Pulmonary/Chest: Effort normal and breath sounds normal. No rales or wheezes. Abdomen: Soft. No tenderness, not distended, no ascites, no organomegaly   Musculoskeletal: Normal range of motion. No edema lower ext. Neurological: CN II-XII grossly intact, no focal neurological deficits. Follows all commands. Skin: Normal turgor, no bleeding, no bruising +Surgical incision intact to left side of head. Palliative Performance Scale:  ___60%  Ambulation reduced; Significant disease; Can't do hobbies/housework; intake normal or reduced; occasional assist; LOC full/confusion  _x__50%  Mainly sit/lie; Extensive disease; Can't do any work; Considerable assist; intake normal or reduced; LOC full/confusion  ___40%  Mainly in bed; Extensive disease; Mainly assist; intake normal or reduced; LOC full/confusion   ___30%  Bed Bound; Extensive disease; Total care; intake reduced; LOCfull/confusion  ___20%  Bed Bound; Extensive disease; Total care; intake minimal; Drowsy/coma  ___10%  Bed Bound; Extensive disease;  Total care; Mouth care only; Drowsy/coma  ___0       Death      Plan Palliative Interaction: I received update from [facility] RN. She reports patient possibly going to ARU today. She reports patient to be A/O, and able to carry conversation with some dysarthria. Speech therapy reports patient doing well, and is following up for treatment. I visited patient, and introduced myself and the palliative role to him. Patient was very pleasant, and reports being unsure of DC plan. I discussed patient functional status prior to admission, and he reports to live alone, and was independent and drove. He reports his wife passing away 5 years ago. He reports 2 biological children (Jeff Ang). He reports thinking that Zahida Sandy was primary agent, but document on file has daughter Tiarra Leach as primary agent, and Zahida Sandy as first alternate. I discussed patients chronic medical history, and current hospitalized problems. We discussed patients cancer Dx, and patient reports wanting to try Treatment. He reports if he can not tolerate it, then he will focus on comfort. He is agreeable to rehab, and would like to pursue chemoradiation treatment. I discussed all three code classifications in full detail. We discussed CPR, resuscitative meds, defibrillation, and intubation in more detail. We discussed patients underlying disease, and if these measures would be more beneficial or more burdensome to him. Patient reports wanting everything done up until CPR/Intubation, but then not wanting these measures. Patient is able to carry a full conversation, and is oriented but has some difficulty with getting some words out. I offered to call and speak to his HCPOA/daughter Tiarra Leach and patient was agreeable. I held patients hand, and offered him much emotional support. He reports being thankful for my visit/discussion, and support. I reached out to daughter Robinson Eugene at this time, and left voicemail with return contact information. I also reached out to daughter Tarun Murphy at this time, and left contact information for return call. Education/support to staff  Education/support to family  Education/support to patient  Discharge planning/helping to coordinate care  Communications with primary service  Providing support for coping/adaptation/distress of family  Providing support for coping/adaptation/distress of patient  Discussing meaning/purpose   Decisional capacity assessed  Continue with current plan of care  Clarification of medical condition to patient and family  Code status clarified: Full Code  Code status clarified: [facility]  Code status clarified: [facility]  Palliative care orders introduced  Provided information about hospice  Validating patient/family distress  Patient reports wantig treatment, and is A/O and able to carry conversation. Patient does not want CPR/Intubation and would like to be a DNRCC-A no intubation. Principle Problem/Diagnosis:  Brain Mass    Additional Assessments:   Active Problems:    Brain mass    Acute encephalopathy    Cerebral edema (HCC)    Thrombocytopenia (HCC)    GBM (glioblastoma multiforme) (HCC)  Resolved Problems:    * No resolved hospital problems. *    1- Symptom management/ pain control     Pain Assessment:  The patient is not having any pain. Anxiety:  none                          Dyspnea:  none                          Fatigue:  Generalized weakness    Other: We feel the patient symptoms are being controlled. his current regimen is reviewed by myself and discussed with the staff.      2- Goals of care evaluation   The patient goals of care are live longer, improve or maintain function/quality of life and support for family/caregiver   Goals of care discussed with:    [] Patient independently    [x] Patient and Family    [] Family or Healthcare DPOA independently [] Unable to discuss with patient, family/DPOA not present    3- Code Status  Full Code    4- Other recommendations   - We will continue to provide comfort and support to the patient and the family  Please call with any palliative questions or concerns. Palliative Care Team is available via perfect serve or via phone. Palliative Care will continue to follow Mr. Chacon's care as needed. Thank you for allowing Palliative Care to participate in the care of Mr. Alysia Rae . This note has been dictated by dragon, typing errors may be a possibility. The total time I spent in seeing the patient, discussing goals of care, advanced directives, code status and other major issues was more than 60 minutes      Electronically signed by   MING Espinosa - CNP  Palliative Care Team  on 12/21/2020 at 9:32 AM    Palliative Care can be reached via perfect ED01.

## 2020-12-21 NOTE — PROGRESS NOTES
Daughter Ruth Gonsales reports that she came to speak with her dad and he expressed to her that he wants to try treatment. She reports patient having a DNRCC-A form at home, so I asked for copy. She reports being in [place] presently but leaving to go back home to [place]. Daughter Fede Alicea lives in [place], and is primary [address] [place] [address]. Ruth Gonsales reports to be financial DPOA, and some family dynamics between her and her sister. Ruth Gonsales has a special needs child, and is unable to come assist with dads care. She reports that her father does not want Fede Alicea living with him, and Fede Alicea does not drive to assist with treatment appointments. Ruth Gonsales has expressed that if patient can not live independently, that he may need to be placed. Patient is planning to [facility]. Angela reports believing that her sister goals for her father are different than hers, and at one time they had wanted hospice care. Ruth Gonsales reports after speaking to her father, she believes he can make his own decisions, and reports him wanting to seek treatment. Ruth Gonsales is tearful during conversation, and I offered her support.

## 2020-12-22 LAB
ANION GAP SERPL CALCULATED.3IONS-SCNC: 8 MMOL/L (ref 9–17)
BUN BLDV-MCNC: 25 MG/DL (ref 8–23)
BUN/CREAT BLD: ABNORMAL (ref 9–20)
CALCIUM IONIZED: 1.11 MMOL/L (ref 1.13–1.33)
CALCIUM SERPL-MCNC: 8.5 MG/DL (ref 8.6–10.4)
CHLORIDE BLD-SCNC: 104 MMOL/L (ref 98–107)
CO2: 21 MMOL/L (ref 20–31)
CREAT SERPL-MCNC: 0.84 MG/DL (ref 0.7–1.2)
GFR AFRICAN AMERICAN: >60 ML/MIN
GFR NON-AFRICAN AMERICAN: >60 ML/MIN
GFR SERPL CREATININE-BSD FRML MDRD: ABNORMAL ML/MIN/{1.73_M2}
GFR SERPL CREATININE-BSD FRML MDRD: ABNORMAL ML/MIN/{1.73_M2}
GLUCOSE BLD-MCNC: 101 MG/DL (ref 70–99)
HCT VFR BLD CALC: 48 % (ref 40.7–50.3)
HEMOGLOBIN: 15.6 G/DL (ref 13–17)
MCH RBC QN AUTO: 30.5 PG (ref 25.2–33.5)
MCHC RBC AUTO-ENTMCNC: 32.5 G/DL (ref 28.4–34.8)
MCV RBC AUTO: 93.9 FL (ref 82.6–102.9)
NRBC AUTOMATED: 0 PER 100 WBC
PDW BLD-RTO: 13.2 % (ref 11.8–14.4)
PHOSPHORUS: 3.2 MG/DL (ref 2.5–4.5)
PLATELET # BLD: 171 K/UL (ref 138–453)
PMV BLD AUTO: 9.6 FL (ref 8.1–13.5)
POTASSIUM SERPL-SCNC: 4.3 MMOL/L (ref 3.7–5.3)
RBC # BLD: 5.11 M/UL (ref 4.21–5.77)
SODIUM BLD-SCNC: 133 MMOL/L (ref 135–144)
WBC # BLD: 9.9 K/UL (ref 3.5–11.3)

## 2020-12-22 PROCEDURE — 6360000002 HC RX W HCPCS: Performed by: STUDENT IN AN ORGANIZED HEALTH CARE EDUCATION/TRAINING PROGRAM

## 2020-12-22 PROCEDURE — 2060000000 HC ICU INTERMEDIATE R&B

## 2020-12-22 PROCEDURE — 80048 BASIC METABOLIC PNL TOTAL CA: CPT

## 2020-12-22 PROCEDURE — 2580000003 HC RX 258: Performed by: PHYSICIAN ASSISTANT

## 2020-12-22 PROCEDURE — 82330 ASSAY OF CALCIUM: CPT

## 2020-12-22 PROCEDURE — 36415 COLL VENOUS BLD VENIPUNCTURE: CPT

## 2020-12-22 PROCEDURE — 97535 SELF CARE MNGMENT TRAINING: CPT

## 2020-12-22 PROCEDURE — 6370000000 HC RX 637 (ALT 250 FOR IP): Performed by: PSYCHIATRY & NEUROLOGY

## 2020-12-22 PROCEDURE — 85027 COMPLETE CBC AUTOMATED: CPT

## 2020-12-22 PROCEDURE — 2500000003 HC RX 250 WO HCPCS: Performed by: PHYSICIAN ASSISTANT

## 2020-12-22 PROCEDURE — 97530 THERAPEUTIC ACTIVITIES: CPT

## 2020-12-22 PROCEDURE — 99232 SBSQ HOSP IP/OBS MODERATE 35: CPT | Performed by: INTERNAL MEDICINE

## 2020-12-22 PROCEDURE — 99232 SBSQ HOSP IP/OBS MODERATE 35: CPT | Performed by: STUDENT IN AN ORGANIZED HEALTH CARE EDUCATION/TRAINING PROGRAM

## 2020-12-22 PROCEDURE — 97110 THERAPEUTIC EXERCISES: CPT

## 2020-12-22 PROCEDURE — 92507 TX SP LANG VOICE COMM INDIV: CPT

## 2020-12-22 PROCEDURE — 84100 ASSAY OF PHOSPHORUS: CPT

## 2020-12-22 PROCEDURE — 6370000000 HC RX 637 (ALT 250 FOR IP): Performed by: PHYSICIAN ASSISTANT

## 2020-12-22 PROCEDURE — 6370000000 HC RX 637 (ALT 250 FOR IP): Performed by: NURSE PRACTITIONER

## 2020-12-22 PROCEDURE — 99231 SBSQ HOSP IP/OBS SF/LOW 25: CPT | Performed by: PHYSICAL MEDICINE & REHABILITATION

## 2020-12-22 PROCEDURE — APPSS30 APP SPLIT SHARED TIME 16-30 MINUTES: Performed by: PHYSICIAN ASSISTANT

## 2020-12-22 RX ADMIN — SODIUM CHLORIDE, PRESERVATIVE FREE 10 ML: 5 INJECTION INTRAVENOUS at 21:41

## 2020-12-22 RX ADMIN — STANDARDIZED SENNA CONCENTRATE AND DOCUSATE SODIUM 1 TABLET: 8.6; 5 TABLET ORAL at 09:20

## 2020-12-22 RX ADMIN — FAMOTIDINE 20 MG: 10 INJECTION INTRAVENOUS at 09:20

## 2020-12-22 RX ADMIN — CARVEDILOL 3.12 MG: 3.12 TABLET, FILM COATED ORAL at 09:20

## 2020-12-22 RX ADMIN — ENOXAPARIN SODIUM 30 MG: 100 INJECTION SUBCUTANEOUS at 21:40

## 2020-12-22 RX ADMIN — VENLAFAXINE 37.5 MG: 37.5 TABLET ORAL at 09:20

## 2020-12-22 RX ADMIN — DIVALPROEX SODIUM 500 MG: 500 TABLET, EXTENDED RELEASE ORAL at 21:41

## 2020-12-22 RX ADMIN — DESMOPRESSIN ACETATE 40 MG: 0.2 TABLET ORAL at 21:41

## 2020-12-22 RX ADMIN — SODIUM CHLORIDE, PRESERVATIVE FREE 10 ML: 5 INJECTION INTRAVENOUS at 09:23

## 2020-12-22 RX ADMIN — CARVEDILOL 3.12 MG: 3.12 TABLET, FILM COATED ORAL at 18:10

## 2020-12-22 RX ADMIN — FAMOTIDINE 20 MG: 10 INJECTION INTRAVENOUS at 21:40

## 2020-12-22 RX ADMIN — ENOXAPARIN SODIUM 30 MG: 100 INJECTION SUBCUTANEOUS at 09:20

## 2020-12-22 RX ADMIN — DIVALPROEX SODIUM 500 MG: 500 TABLET, EXTENDED RELEASE ORAL at 09:20

## 2020-12-22 ASSESSMENT — PAIN SCALES - WONG BAKER
WONGBAKER_NUMERICALRESPONSE: 0
WONGBAKER_NUMERICALRESPONSE: 2

## 2020-12-22 ASSESSMENT — PAIN SCALES - GENERAL: PAINLEVEL_OUTOF10: 0

## 2020-12-22 NOTE — PROGRESS NOTES
Occupational Therapy  Facility/Department: [facility] NEURO  Daily Treatment Note  NAME: Richmond Aguilar  : 1945  MRN: [id number]    Date of Service: 2020    Discharge Recommendations: Further therapy recommended at discharge. The patient should be able to tolerate at least three hours of therapy per day over 5 days or 15 hours over 7 days. Assessment   Performance deficits / Impairments: Decreased functional mobility ; Decreased safe awareness;Decreased ADL status; Decreased endurance;Decreased high-level IADLs;Decreased cognition;Decreased strength;Decreased balance  Assessment: Pt would be unsafe to return home alone at this time due to required increased physical assistance and verbal cuing. Pt would benefit from further therapy at discharge in order to increase safety and independence. Treatment Diagnosis: stroke like symptoms, glioblastoma  Prognosis: Good  REQUIRES OT FOLLOW UP: Yes  Activity Tolerance  Activity Tolerance: Patient Tolerated treatment well;Patient limited by fatigue;Treatment limited secondary to decreased cognition  Safety Devices  Safety Devices in place: Yes  Type of devices: All fall risk precautions in place;Call light within reach; Chair alarm in place;Gait belt;Patient at risk for falls; Left in chair;Nurse notified         Patient Diagnosis(es): The primary encounter diagnosis was Brain mass. A diagnosis of [place] coma scale total score 3-8, in the field (EMT or ambulance) [facility]) was also pertinent to this visit. has no past medical history on file. has a past surgical history that includes craniotomy (Left, 2020) and craniotomy (Left, 2020). Restrictions  Restrictions/Precautions  Restrictions/Precautions: General Precautions, Surgical Protocols, Fall Risk, Up as Tolerated  Required Braces or Orthoses?: No  Position Activity Restriction  Other position/activity restrictions: Up with assist  Subjective   General  Chart Reviewed:  Yes Patient assessed for rehabilitation services?: Yes  Family / Caregiver Present: No  Diagnosis: stroke like symptoms, glioblastoma  General Comment  Comments: RN ok'd for therapy this morning. Pt agreeable to participate in session and cooperative/pleasant throughout, although confused  Pain Assessment  [place] Pain Rating: No hurt  Vital Signs  Patient Currently in Pain: Denies   Orientation     Objective    ADL  Feeding: Stand by assistance;Setup;Verbal cueing; Increased time to complete(able to open containers and feed self)  Grooming: Stand by assistance;Setup;Verbal cueing; Increased time to complete(wash face, rinse mouth)  UE Bathing: Minimal assistance;Setup;Verbal cueing; Increased time to complete(w/back)  LE Bathing: Moderate assistance;Setup;Verbal cueing; Increased time to complete(w/lower legs and feet)  UE Dressing: Minimal assistance;Setup;Verbal cueing; Increased time to complete(w/gown)  LE Dressing: Maximum assistance;Setup;Verbal cueing; Increased time to complete(w/footies)  Toileting: Stand by assistance;Setup(using urinal at bed side)      Pt in bed upon arrival. Transferred to eob and func mob w/RW to recliner on other side of bed. Pt slightly unsteady but had no gross LOB. Pt setup at tray table for self care (see above for LOF). BLE elevated, call light and phone in reach. RN notified.  Pt has moments of confusion throughout tx and would not be safe to go home alone and would benefit from cont therapy after discharge from hospital.       Balance  Sitting Balance: Stand by assistance(sitting at eob and in recliner unsupported)  Standing Balance: Contact guard assistance(w/RW)  Standing Balance  Time: Pt stood for approx 3-4 min for transfer from eob to recliner and for mayra care  Comment: w/RW  Functional Mobility  Functional - Mobility Device: Rolling Walker  Assist Level: Minimal assistance  Functional Mobility Comments: No LOB  Bed mobility  Rolling to Right: Modified independent Supine to Sit: Minimal assistance  Sit to Supine: Minimal assistance  Scooting: Stand by assistance  Transfers  Stand Step Transfers: Minimal assistance  Sit to stand: Minimal assistance  Stand to sit: Minimal assistance  Transfer Comments: w/RW, no gross LOB     Plan   Plan  Times per week: 3-5x/wk    Goals  Short term goals  Time Frame for Short term goals: pt will, by discharge  Short term goal 1: maintain attention to task for ~6 minutes with 2-3 verbal cues for redirection  Short term goal 2: complete LB ADLs with mod A, set up and AE, as needed  Short term goal 3: complete UB ADLs and grooming tasks with SBA and set up  Short term goal 4: increase activity tolerance to 20+ minutes in order to participate in daily tasks  Short term goal 5: dem SBA during functional transfers/functional mobility with LRD     Therapy Time   Individual Concurrent Group Co-treatment   Time In 1435         Time Out  1535         Minutes  60 total tx time                  HEATHER JONES/CADEN

## 2020-12-22 NOTE — PROGRESS NOTES
[address] [facility] Neurology   [facility]    Progress note             Date:   12/22/2020  Patient name:  Lizabeth Salter  Date of admission:  12/9/2020  1:32 PM  MRN:   [id number]  Account:  [de-identified]  YOB: 1945  PCP:    No primary care provider on file. Room:   [address] [place] [address]  Code Status:    Full Code    Chief Complaint:     Chief Complaint   Patient presents with    Cerebrovascular Accident   GBM status post resection    Interval hx:     No acute events overnight, no significant change in patient's neurological status. He is awake and alert and following commands. Discharge pending. Brief History of Present Illness:     64-year-old male who initially presented for altered mental status after nursing the Facebook posts were not making sense at home.  Following day, the patient's mental status declined and he became less responsive.  Brought to the hospital with an NIH stroke scale of 20, aphasia.  Patient was found to have a left posterior temporal lobe mass with vasogenic edema.  He underwent resection of underlying mass.  This is consistent with GBM.  Hematology was also consulted. Past Medical History:     History reviewed. No pertinent past medical history. Past Surgical History:     Past Surgical History:   Procedure Laterality Date    CRANIOTOMY Left 12/14/2020    temporal for tumor    CRANIOTOMY Left 12/14/2020    TEMPORAL CRANIOTOMY FOR TUMOR, IMAGE GUIDED, AWAKE performed by Florin Wong DO at [place] [address]        Medications Prior to Admission:     Prior to Admission medications    Not on File        Allergies:     Patient has no known allergies. Social History:     Tobacco:    has no history on file for tobacco.  Alcohol:      has no history on file for alcohol. Drug Use:  has no history on file for drug. Family History:     History reviewed. No pertinent family history.     Review of Systems:     ROS: Constitutional  Negative for fever and chills    HEENT  Negative for ear discharge, ear pain, nosebleed    Eyes  Negative for photophobia, pain and discharge    Respiratory  Negative for hemoptysis and sputum    Cardiovascular  Negative for orthopnea, claudication and PND    Gastrointestinal  Negative for abdominal pain, diarrhea, blood in stool    Musculoskeletal  Negative for joint pain, negative for myalgia    Neurology Negative for seizures, loss of consciousness   Skin  Negative for rash or itching    Endo/heme/allergies  Negative for polydipsia, environmental allergy    Psychiatric/behavioral  Negative for suicidal ideation. Patient is not anxious        Physical Exam:   /63   Pulse 54   Temp 97.4 °F (36.3 °C) (Oral)   Resp 17   Ht 6' 2\" (1.88 m)   Wt 200 lb (90.7 kg)   SpO2 93%   BMI 25.68 kg/m²   Temp (24hrs), Av.9 °F (36.6 °C), Min:97.4 °F (36.3 °C), Max:98.6 °F (37 °C)    Recent Labs     20   POCGLU 167*       Intake/Output Summary (Last 24 hours) at 2020 1224  Last data filed at 2020 0920  Gross per 24 hour   Intake 660 ml   Output 850 ml   Net -190 ml         NEUROLOGIC EXAMINATION  GENERAL  Appears comfortable and in no distress   HEENT  NC/ AT   NECK  Supple and no bruits heard   MENTAL STATUS:  Alert, oriented x2, impaired memory, expressive aphasia with some dysarthria.    CRANIAL NERVES: II     -      Visual fields intact to confrontation  III,IV,VI -  EOMs full, no afferent defect, no NEW, no ptosis  V     -     Normal facial sensation  VII    -     Normal facial symmetry  VIII   -     Intact hearing  IX,X -     Symmetrical palate  XI    -     Symmetrical shoulder shrug  XII   -     Midline tongue, no atrophy    MOTOR FUNCTION:  significant for good strength of grade 5/5 in bilateral proximal and distal muscle groups of both upper and lower extremities with normal bulk, normal tone and no involuntary movements, no tremor SENSORY FUNCTION:  Normal touch, normal pin, normal vibration, normal proprioception   CEREBELLAR FUNCTION:  Intact fine motor control over upper limbs   REFLEX FUNCTION:  Symmetric, no perverted reflex, no Babinski sign   STATION and GAIT  Not tested       Investigations:      Laboratory Testing:  Recent Results (from the past 24 hour(s))   Basic Metabolic Panel    Collection Time: 12/22/20  4:01 AM   Result Value Ref Range    Glucose 101 (H) 70 - 99 mg/dL    BUN 25 (H) 8 - 23 mg/dL    CREATININE 0.84 0.70 - 1.20 mg/dL    Bun/Cre Ratio NOT REPORTED 9 - 20    Calcium 8.5 (L) 8.6 - 10.4 mg/dL    Sodium 133 (L) 135 - 144 mmol/L    Potassium 4.3 3.7 - 5.3 mmol/L    Chloride 104 98 - 107 mmol/L    CO2 21 20 - 31 mmol/L    Anion Gap 8 (L) 9 - 17 mmol/L    GFR Non-African American >60 >60 mL/min    GFR African American >60 >60 mL/min    GFR Comment          GFR Staging NOT REPORTED    CBC    Collection Time: 12/22/20  4:01 AM   Result Value Ref Range    WBC 9.9 3.5 - 11.3 k/uL    RBC 5.11 4.21 - 5.77 m/uL    Hemoglobin 15.6 13.0 - 17.0 g/dL    Hematocrit 48.0 40.7 - 50.3 %    MCV 93.9 82.6 - 102.9 fL    MCH 30.5 25.2 - 33.5 pg    MCHC 32.5 28.4 - 34.8 g/dL    RDW 13.2 11.8 - 14.4 %    Platelets 076 220 - 377 k/uL    MPV 9.6 8.1 - 13.5 fL    NRBC Automated 0.0 0.0 per 100 WBC   PHOSPHORUS    Collection Time: 12/22/20  4:01 AM   Result Value Ref Range    Phosphorus 3.2 2.5 - 4.5 mg/dL   CALCIUM, IONIZED    Collection Time: 12/22/20  4:01 AM   Result Value Ref Range    Calcium, Ion 1.11 (L) 1.13 - 1.33 mmol/L         Assessment :      Primary Problem  <principal problem not specified>    Active Hospital Problems    Diagnosis Date Noted    Thrombocytopenia (HCC) [D69.6]     GBM (glioblastoma multiforme) (HCC) [C71.9]     Acute encephalopathy [G93.40]     Cerebral edema ([address] [G93.6]     Brain mass [G93.89] 12/09/2020 42-year-old status post resection of temporal mass year-old female consistent with GBM. Oncology on board, patient continues to express minimal dysarthria but expressive aphasia which has significantly improved. Patient disposition pending. Plan:     NeurologicalGBM s/p resection   Continue to monitor for any neurological changes. Continue seizure precautions as directed. Seizure prophylaxis with Keppra. Disposition pending      Follow-up further recommendations after discussing the case with attending  The plan was discussed with the patient, patient's family and the medical staff. Consultations:   IP CONSULT TO STROKE TEAM  IP CONSULT TO NEUROSURGERY  IP CONSULT TO NEUROCRITICAL CARE  IP CONSULT TO PHYSICAL MEDICINE REHAB  IP CONSULT TO CARDIOLOGY  IP CONSULT TO PHYSICAL MEDICINE REHAB  IP CONSULT TO HEM/ONC  IP CONSULT TO HEM/ONC  IP CONSULT TO NEUROLOGY  IP CONSULT TO PALLIATIVE CARE  IP CONSULT TO ETHICS    Patient is admitted as inpatient status because of co-morbidities listed above, severity of signs and symptoms as outlined, requirement for current medical therapies and most importantly because of direct risk to patient if care not provided in a hospital setting. Dee Chin MD  12/22/2020  12:24 PM    Copy sent to Dr. Gayle primary care provider on file.

## 2020-12-22 NOTE — PROGRESS NOTES
Plan:  [x] Continue ST services    [] Discharge from ST:      Discharge recommendations: Further therapy recommended at discharge.     Treatment completed by: Petros Raza M.S. CF-SLP

## 2020-12-22 NOTE — PROGRESS NOTES
Physical Medicine & Rehabilitation  Progress Note    12/22/2020 1:28 PM     CC: Ambulatory and ADL dysfunction due to change in mental status and weaknessglioblastoma status post resection    Brief history  68-year-old male admitted 12/9/2020 found to have grade 4 glioblastoma status post resection 12/14/2020. He is weaning off Decadron and is on seizure prophylaxis    Hematology/oncologyplan for chemoradiation, family to decide on active treatment or palliative care    Neurosurgeryokay to resume Lovenox surgical pathology grade 4 glioblastoma, wean Decadron over 1 week keep Keppra for 6 months    Neuro critical Decadron 4 mg twice daily x2 days then 4 mg dailyclarify taper on Lovenox       Noted referred to inpatient hospice, daughter has Covid    Subjective:   No complaints. Feels well    ROS:  Denies fevers, chills, sweats. No chest pain, palpitations, lightheadedness. Denies coughing, wheezing or shortness of breath. Denies abdominal pain, nausea, diarrhea or constipation. No new areas of joint pain. Denies new areas of numbness or weakness. Denies new anxiety or depression issues. No new skin problems. Rehabilitation:   PT: 5/19     Restrictions/Precautions: Fall Risk  Other position/activity restrictions: Up with assist   Transfers  Sit to Stand: Minimal Assistance  Stand to sit: Minimal Assistance  Bed to Chair: Minimal assistance, 2 Person Assistance  Comment: used RW  Ambulation 1  Surface: level tile  Device: Rolling Walker  Other Apparatus: Wheelchair follow  Assistance: Contact guard assistance  Quality of Gait: slow unsteady no LOB  Gait Deviations: Slow Juliet, Decreased step length, Decreased step height  Distance: ~40'  Comments: increased time and effort to complete due to SOB and increased fatigue. cueing for sequencing t/o fair return.            OT:12/16  ADL  Feeding: Supervision  Grooming: Stand by assistance, Setup, Verbal cueing UE Bathing: Minimal assistance, Increased time to complete, Contact guard assistance  LE Bathing: Maximum assistance, Increased time to complete  UE Dressing: Minimal assistance, Increased time to complete  LE Dressing: Setup, Verbal cueing, Increased time to complete, Maximum assistance  Toileting: Increased time to complete, Moderate assistance  Additional Comments: Per RN pt. not to have lights on in room untill later this date for precautions from procceger. ADL/Grooming activity sitting EOB 35 minutes. Grooming activity SBA+set up and VCs for sequensing d/t  vision/lighting in room. UB bathe min A to dry L UE, per pt. \"I just cant see what I'm doing\". LB bathe max A to bathe below B knees d/t decreasd flexability and fatigue. LB dress max A to don/doff B socks, UB dress min A -CGA d/t difficulty with lines and decreased vision d/t environment. Balance  Sitting Balance: Stand by assistance  Standing Balance: Contact guard assistance   Standing Balance  Time: Sitting 35 minutes, standing- CGA-min A  Activity: Dynamic sitting- ADL/grooming activity, Dynamic stand- Min A-CGA side step to [facility]  Comment: utilizing RW  Functional Mobility  Functional - Mobility Device: Rolling Walker  Activity: To/from bathroom  Assist Level: Minimal assistance  Functional Mobility Comments: side steps to [facility]     Bed mobility  Supine to Sit: Minimal assistance  Sit to Supine: Minimal assistance  Scooting: Stand by assistance  Comment: increased time and effort to complete.   Transfers  Stand Step Transfers: Minimal assistance  Sit to stand: Minimal assistance  Stand to sit: Minimal assistance  Transfer Comments: utilizing RW   Toilet Transfers  Toilet - Technique: Ambulating  Equipment Used: Standard toilet  Toilet Transfer: Minimal assistance  Toilet Transfers Comments: utilizing grab bar for sit><stand from low toilet BSC placed over toilet to increase height in attempt to increase pt independence               ST: 12/22  Auditory Comprehension:   2-step commands: 0/3 increased to 3/3 with max verbal cues and repetitions      Verbal Expression: Pt demonstrates awareness of deficits. Pt often stated tasks were \"very hard\" and \"not as good as they used to be. \" Specifically with word generation tasks, pt often would say \"I just can't think of the word,\" or \"I know it, it just won't come out. \" ST encouraged pt and provided education re: word finding difficulty, aphasia, and circumlocution strategies with good return.      Picture ID: 5/6 increased to 6/6 with mod verbal cues      Object ID: 3/6 increased to 6/6 with max verbal cues      Word generation, 4 units: 1/2 increased to 2/2 with mod verbal cues     Biographical information: Pt oriented to first and last name (pt stated first name independently and spelled last name accurately. However when ST asked pt to say last name pt only able to do so in repetition). Pt oriented to age, year, and place with forced choice cues. Paraphasias noted with numbers (e.g., pt stated year as 0, age as 52). Pt continues to improve with circumlocution in instances of word finding difficulty (e.g., pt had trouble expressing hospital name but indicated it was \"very big,\" and \"[place]\").      Other: Pt very pleasant and cooperative throughout session and motivated to rehabilitate speech/language skills.           12/21   Auditory Comprehension:   1-step commands: 5/5 independently (pt required occasional repetitions)  2-step commands: 2/4 increased to 4/4 with min verbal and visual cues and repetition of instruction.     Verbal Expression:   Object Naming: 3/6 with mild cues  increased to 6/6 with max verbal cues   [address] Questions: 3/6, increased to 5/6 with max verbal and redirection  Biographical information: Pt oriented to first and last name and age independently. Reporting he wants to go to rehab and then go home.   Responsive Naming 3/6 increased to 5/6 with max cues    Pt. With appropriate simple conversational responses with paraphasic errors noted at times. Often states he cannot think of the word. Education provided re: circumlocution. Decreased comprehension noted        Objective:  /67   Pulse 73   Temp 97.5 °F (36.4 °C) (Oral)   Resp 18   Ht 6' 2\" (1.88 m)   Wt 200 lb (90.7 kg)   SpO2 94%   BMI 25.68 kg/m²  I Body mass index is 25.68 kg/m². I   Wt Readings from Last 1 Encounters:   20 200 lb (90.7 kg)      Temp (24hrs), Av.8 °F (36.6 °C), Min:97.4 °F (36.3 °C), Max:98.6 °F (37 °C)         GEN: well developed, well nourished, no acute distress  HEENT: Normocephalic atraumatic, EOMI, mucous membranes pink and moist ,scalp incision clean, decreased hearing  CV: RRR, no murmurs, rubs or gallops  PULM: CTAB, no rales or rhonchi. Respirations WNL and unlabored  ABD: soft, NT, ND, +BS and equal  NEURO: A&O x3. Sensation intact to light touch. MSK: 4-5 upper extremities 3-4/5 lower extremities  EXTREMITIES: No calf tenderness to palpation bilaterally. No edema BLEs  SKIN: warm dry and intact with good turgor  PSYCH: appropriately interactive. Affect WNL.           Medications   Scheduled Meds:   divalproex  500 mg Oral BID    venlafaxine  37.5 mg Oral Daily    carvedilol  3.125 mg Oral BID     sodium chloride flush  10 mL Intravenous 2 times per day    sennosides-docusate sodium  1 tablet Oral BID    enoxaparin  30 mg Subcutaneous BID    [Held by provider] potassium bicarb-citric acid  40 mEq Oral Daily    famotidine (PEPCID) injection  20 mg Intravenous BID    atorvastatin  40 mg Oral Nightly     Continuous Infusions:   dextrose       PRN Meds:.labetalol, sodium chloride flush, sodium chloride flush, acetaminophen, oxyCODONE **OR** oxyCODONE, morphine **OR** morphine, magnesium hydroxide, bisacodyl, promethazine **OR** ondansetron, glucose, dextrose, glucagon (rDNA), dextrose, sodium chloride flush     Diagnostics:     CBC:   Recent Labs 12/20/20  0433 12/21/20  0439 12/22/20  0401   WBC 8.9 9.3 9.9   RBC 4.82 4.86 5.11   HGB 14.9 15.1 15.6   HCT 44.7 46.6 48.0   MCV 92.7 95.9 93.9   RDW 12.8 13.0 13.2    161 171     BMP:   Recent Labs     12/20/20  0433 12/21/20  0439 12/22/20  0401   * 132* 133*   K 4.3 4.2 4.3    102 104   CO2 21 21 21   PHOS 2.9 3.0 3.2   BUN 27* 26* 25*   CREATININE 0.73 0.79 0.84     BNP: No results for input(s): BNP in the last 72 hours. PT/INR: No results for input(s): PROTIME, INR in the last 72 hours. APTT: No results for input(s): APTT in the last 72 hours. CARDIAC ENZYMES: No results for input(s): CKMB, CKMBINDEX, TROPONINT in the last 72 hours. Invalid input(s): CKTOTAL;3  FASTING LIPID PANEL:  Lab Results   Component Value Date    HDL 37 (L) 12/09/2020     LIVER PROFILE:   No results for input(s): AST, ALT, ALB, BILIDIR, BILITOT, ALKPHOS in the last 72 hours. I/O (24Hr): Intake/Output Summary (Last 24 hours) at 12/22/2020 1328  Last data filed at 12/22/2020 0920  Gross per 24 hour   Intake 660 ml   Output 850 ml   Net -190 ml       Glu last 24 hour  Recent Labs     12/19/20  2036   POCGLU 167*       No results for input(s): CLARITYU, COLORU, PHUR, SPECGRAV, PROTEINU, RBCUA, BLOODU, BACTERIA, NITRU, WBCUA, LEUKOCYTESUR, YEAST, Felipa Frieze in the last 72 hours. Impression: Mr. Mil Alejandra is a 66 y.o.  male with a history of <principal problem not specified>     1.  Left posterior temporal mass, change in mental status, left facial droopplan for resection/biopsy 12/14 Decadron taper completed   2. Seizure keep for 6 months, now on Depakoteadjusted to help with mood stabilization  3. Aphasia  4. hard of hearing  4. GERDPepcid  5.   GBS grade 4oncology has discussed treatment options chemoradiation, family deciding on treatment versus palliative, family wants hospice, patient would like [facility] rehab and treatment 6. Pain, Roxicodone,morphineminimal use of narcotics has not used morphine Tylenol  7. HypertensionCoreg  8. DepressionEffexor     Recommendations:  1. Diagnosis: Left temporal mass with left facial droop, change in mental status, aphasia, GBS grade 4  2. Therapy:  30 feet contact-guard with 1 loss of balance requiring min assist, ADLs max assist lower extremities, cognitive issues  3. Medical  Necessity: As above  4. Support: Clarify, will need 24/7 due to cognitive deficitsnoted daughter was planning to move in however she has Covidclarify support has family wanted hospice, [facility]  5. Rehab recommendation: would benefit from acute inpatient rehabilitation when medically ready, will need 24/7 eventual discharge,   6. DVT proph: Neptali Gonzalez MD       This note is created with the assistance of a speech recognition program.  While intending to generate a document that actually reflects the content of the visit, the document can still have some errors including those of syntax and sound a like substitutions which may escape proof reading.   In such instances, actual meaning can be extrapolated by contextual diversion

## 2020-12-22 NOTE — CARE COORDINATION
Called daughter Sandy Marinelli left message, requesting a call back to discuss if patient will have 24 hour support once D/C from ARU.   Awaiting return call

## 2020-12-22 NOTE — PROGRESS NOTES
Physical Therapy  Facility/Department: [facility] NEURO  Daily Treatment Note  NAME: Pastor Batres  : 1945  MRN: [id number]    Date of Service: 2020    Discharge Recommendations:  Patient would benefit from continued therapy after discharge   PT Equipment Recommendations  Other: CTA    Assessment   Body structures, Functions, Activity limitations: Decreased functional mobility ; Decreased posture;Decreased endurance;Decreased ROM; Decreased strength;Decreased safe awareness;Decreased balance  Assessment: Pt amb 60 ft with RW and CGA. Would benefit from continued PT after d/c. Prognosis: Good  REQUIRES PT FOLLOW UP: Yes  Activity Tolerance  Activity Tolerance: Patient limited by fatigue;Patient limited by endurance; Patient Tolerated treatment well     Patient Diagnosis(es): The primary encounter diagnosis was Brain mass. A diagnosis of [place] coma scale total score 3-8, in the field (EMT or ambulance) [facility]) was also pertinent to this visit. has no past medical history on file. has a past surgical history that includes craniotomy (Left, 2020) and craniotomy (Left, 2020). Restrictions  Restrictions/Precautions  Restrictions/Precautions: General Precautions, Surgical Protocols, Fall Risk, Up as Tolerated  Required Braces or Orthoses?: No  Position Activity Restriction  Other position/activity restrictions: Up with assist  Subjective   General  Response To Previous Treatment: Patient with no complaints from previous session. Family / Caregiver Present: No  Subjective  Subjective: RN and pt agreeable to PT. Pt resting in bed upon arrival with no c/o pain.   Pain Screening  Patient Currently in Pain: Denies  Vital Signs  Patient Currently in Pain: Denies       Orientation  Orientation  Overall Orientation Status: Within Functional Limits(demo short term memory loss, but oriented)  Cognition      Objective   Bed mobility  Rolling to Left: Stand by assistance Supine to Sit: Stand by assistance  Scooting: Stand by assistance  Transfers  Sit to Stand: Minimal Assistance  Stand to sit: Minimal Assistance  Comment: post lean upon standing with initial c/o dizziness  Ambulation  Ambulation?: Yes  Ambulation 1  Surface: level tile  Device: Rolling Walker  Assistance: Minimal assistance  Quality of Gait: decreased lux, flexed posture, difficulty sequencing, 2 standing rest breaks, no LOB  Gait Deviations: Slow Lux;Decreased step length;Decreased step height  Distance: 60 ft  Comments: increased time and effort to complete due to SOB and increased fatigue. cueing for sequencing t/o fair return. Balance  Posture: Good  Sitting - Static: Good  Sitting - Dynamic: Good  Standing - Static: Fair;+  Standing - Dynamic: Fair;-  Comments: Standing balance assessed w/ RW;            Goals  Short term goals  Time Frame for Short term goals: 14  Short term goal 1: Pt to perform bed mobility CGA  Short term goal 2: Pt to demonstrate functional transfers CGA  Short term goal 3: Ambulate 100ft w/ least restrictive AD CGA  Short term goal 4: Demonstrate standing balance of good - to decrease fall risk  Patient Goals   Patient goals :  To go home    Plan    Plan  Times per week: 5-6x/week  Current Treatment Recommendations: Strengthening, Transfer Training, Endurance Training, Patient/Caregiver Education & Training, ROM, Balance Training, Gait Training, Home Exercise Program, Functional Mobility Training, Stair training, Safety Education & Training  Safety Devices  Type of devices: Gait belt, Nurse notified, Call light within reach, Patient at risk for falls, All fall risk precautions in place  Restraints  Initially in place: No     Therapy Time   Individual Concurrent Group Co-treatment   Time In 1446         Time Out 1509         Minutes 23         Timed Code Treatment Minutes: 213 [facility]

## 2020-12-22 NOTE — PROGRESS NOTES
Today's Date: 12/22/2020  Patient Name: Dalila Morales  Date of admission: 12/9/2020  1:32 PM  Patient's age: 66 y.o., 1945  Admission Dx: Brain mass [G93.89]    Reason for Consult: management recommendations for left brain mass  Requesting Physician: Pati Mathew MD    CHIEF COMPLAINT: Altered mental status    SUBJECTIVE:  Patient was seen and examined. Getting PT/OT  No NV   No headaches  NO SOB    BRIEF CASE HISTORY:    The patient is a 66 y.o.  male who is admitted to the hospital for acute encephalopathy and left brain mass. Patient presented with confusion and strokelike symptoms about a week ago. Found to have a left temporal lobe mass with cerebral edema and midline shift on brain imaging. He was managing neuro ICU and received steroids and antiseizure medications. He underwent craniotomy with resection of left temporal lobe tumor on 12/14/2020. Pathology report on sample obtained showed grade 4 glioblastoma multiforme. His past medical history includes essential hypertension and hyperlipidemia. He lost his wife to cancer. His 2 daughters are his designated health power of . Patient is pleasantly confused and is very hard of hearing. He appears to understand simple commands and conversations. He is currently on the neuro stepdown floor. Past Medical History:   has no past medical history on file. Past Surgical History:   has a past surgical history that includes craniotomy (Left, 12/14/2020) and craniotomy (Left, 12/14/2020). Medications:    Reviewed in Epic     Allergies:  Patient has no known allergies. Social History:        Family History: family history is not on file. Family history was reviewed and no pertinent family history noted. REVIEW OF SYSTEMS: Limited due to patient's altered mental status  Constitutional: No fever or chills.   Eyes: No eye discharge, double vision, or eye pain HEENT: negative for sore mouth, sore throat, hoarseness and voice change   Respiratory: negative for cough , sputum, dyspnea, wheezing, hemoptysis, chest pain   Cardiovascular: negative for chest pain, dyspnea,  Gastrointestinal: negative for nausea, vomiting, diarrhea, constipation, abdominal pain, dysphagia  Genitourinary: negative for frequency, dysuria, nocturia, urinary incontinence, and hematuria   Integument: negative for rash   Hematologic/Lymphatic: negative for easy bruising, bleeding  Musculoskeletal: negative for myalgias, arthralgias, pain  Neurological: Positive for weakness, negative for headaches, dizziness    PHYSICAL EXAM:      /67   Pulse 68   Temp 97.5 °F (36.4 °C) (Oral)   Resp 19   Ht 6' 2\" (1.88 m)   Wt 200 lb (90.7 kg)   SpO2 97%   BMI 25.68 kg/m²    Temp (24hrs), Av.8 °F (36.6 °C), Min:97.4 °F (36.3 °C), Max:98.6 °F (37 °C)    General appearance - well appearing, not in pain or distress   Mental status - alert and cooperative   Eyes - pupils equal and reactive, extraocular eye movements intact   Ears -not examined  Mouth - mucous membranes moist, pharynx normal without lesions   Neck - supple, no significant adenopathy   Lymphatics - no palpable lymphadenopathy, no hepatosplenomegaly   Chest - clear to auscultation, no wheezes, rales or rhonchi, symmetric air entry   Heart - normal rate, regular rhythm, normal S1, S2, no murmurs  Abdomen - soft, nontender, nondistended, no masses or organomegaly   Neurological - alert, not oriented, distorted speech  Musculoskeletal - no joint tenderness, deformity or swelling   Extremities - peripheral pulses normal, no pedal edema, no clubbing or cyanosis   Skin - normal coloration and turgor, no rashes, no suspicious skin lesions noted ,    DATA:    Labs:   CBC:   Recent Labs     20   WBC 9.3 9.9   HGB 15.1 15.6   HCT 46.6 48.0    171     BMP:   Recent Labs     20 * 133*   K 4.2 4.3   CO2 21 21   BUN 26* 25*   CREATININE 0.79 0.84   LABGLOM >60 >60   GLUCOSE 103* 101*     PT/INR: No results for input(s): PROTIME, INR in the last 72 hours. IMAGING DATA:      Primary Problem  <principal problem not specified>    Active Hospital Problems    Diagnosis Date Noted    Thrombocytopenia ([facility] [address] [D69.6]     GBM (glioblastoma multiforme) (HCC) [C71.9]     Acute encephalopathy [G93.40]     Cerebral edema ([address] [G93.6]     Brain mass [G93.89] 12/09/2020       IMPRESSION:   1. Glioblastoma multiforme. High-grade. 2. Cerebral edema. 3. Essential hypertension. 4. Mild thrombocytopenia. RECOMMENDATIONS:  Records, labs and imaging reviewed. He is status post craniotomy and resection of tumor  Patient will need chemoradiation as o/p  Palliative care on board to help with Goals of care  Family still discussing goals of care and patent wishes  Patient likely will go to ARU soon. Will plan follow up with oncology based on pt family wishes  Will follow    Juliocesar Hui MD  Hematologist/Medical Oncologist    Cell: [phone]      This note is created with the assistance of a speech recognition program.  While intending to generate a document that actually reflects the content of the visit, the document can still have some errors including those of syntax and sound a like substitutions which may escape proof reading. It such instances, actual meaning can be extrapolated by contextual diversion.

## 2020-12-22 NOTE — PROGRESS NOTES
HEENT: negative for sore mouth, sore throat, hoarseness and voice change   Respiratory: negative for cough , sputum, dyspnea, wheezing, hemoptysis, chest pain   Cardiovascular: negative for chest pain, dyspnea,  Gastrointestinal: negative for nausea, vomiting, diarrhea, constipation, abdominal pain, dysphagia  Genitourinary: negative for frequency, dysuria, nocturia, urinary incontinence, and hematuria   Integument: negative for rash   Hematologic/Lymphatic: negative for easy bruising, bleeding  Musculoskeletal: negative for myalgias, arthralgias, pain  Neurological: Positive for weakness, negative for headaches, dizziness    PHYSICAL EXAM:      /68   Pulse 59   Temp 98.6 °F (37 °C) (Oral)   Resp 19   Ht 6' 2\" (1.88 m)   Wt 200 lb (90.7 kg)   SpO2 94%   BMI 25.68 kg/m²    Temp (24hrs), Av.7 °F (36.5 °C), Min:97.3 °F (36.3 °C), Max:98.6 °F (37 °C)    General appearance - well appearing, not in pain or distress   Mental status - alert and cooperative   Eyes - pupils equal and reactive, extraocular eye movements intact   Ears -not examined  Mouth - mucous membranes moist, pharynx normal without lesions   Neck - supple, no significant adenopathy   Lymphatics - no palpable lymphadenopathy, no hepatosplenomegaly   Chest - clear to auscultation, no wheezes, rales or rhonchi, symmetric air entry   Heart - normal rate, regular rhythm, normal S1, S2, no murmurs  Abdomen - soft, nontender, nondistended, no masses or organomegaly   Neurological - alert, not oriented, distorted speech  Musculoskeletal - no joint tenderness, deformity or swelling   Extremities - peripheral pulses normal, no pedal edema, no clubbing or cyanosis   Skin - normal coloration and turgor, no rashes, no suspicious skin lesions noted ,    DATA:    Labs:   CBC:   Recent Labs     20   WBC 8.9 9.3   HGB 14.9 15.1   HCT 44.7 46.6    161     BMP:   Recent Labs     20   * 132* K 4.3 4.2   CO2 21 21   BUN 27* 26*   CREATININE 0.73 0.79   LABGLOM >60 >60   GLUCOSE 132* 103*     PT/INR: No results for input(s): PROTIME, INR in the last 72 hours. IMAGING DATA:      Primary Problem  <principal problem not specified>    Active Hospital Problems    Diagnosis Date Noted    Thrombocytopenia ([facility] [address] [D69.6]     GBM (glioblastoma multiforme) (HCC) [C71.9]     Acute encephalopathy [G93.40]     Cerebral edema ([facility] [address] [G93.6]     Brain mass [G93.89] 12/09/2020       IMPRESSION:   1. Glioblastoma multiforme. High-grade. 2. Cerebral edema. 3. Essential hypertension. 4. Mild thrombocytopenia. RECOMMENDATIONS:  Records, labs and imaging reviewed. He is status post craniotomy and resection of tumor  Patient will need chemoradiation as o/p  Palliative care on board to help with Goals of care  Family still discussing goals of care and patent wishes, who I am not sure understand the complete situation  Will reach to daughters   Patient likely will go to ARU soon  Will follow    Juliocesar Martin MD  Hematologist/Medical Oncologist    Cell: [phone]      This note is created with the assistance of a speech recognition program.  While intending to generate a document that actually reflects the content of the visit, the document can still have some errors including those of syntax and sound a like substitutions which may escape proof reading. It such instances, actual meaning can be extrapolated by contextual diversion.

## 2020-12-23 LAB
ANION GAP SERPL CALCULATED.3IONS-SCNC: 14 MMOL/L (ref 9–17)
BUN BLDV-MCNC: 26 MG/DL (ref 8–23)
BUN/CREAT BLD: ABNORMAL (ref 9–20)
CALCIUM IONIZED: 1.12 MMOL/L (ref 1.13–1.33)
CALCIUM SERPL-MCNC: 8.2 MG/DL (ref 8.6–10.4)
CHLORIDE BLD-SCNC: 101 MMOL/L (ref 98–107)
CO2: 20 MMOL/L (ref 20–31)
CREAT SERPL-MCNC: 0.88 MG/DL (ref 0.7–1.2)
GFR AFRICAN AMERICAN: >60 ML/MIN
GFR NON-AFRICAN AMERICAN: >60 ML/MIN
GFR SERPL CREATININE-BSD FRML MDRD: ABNORMAL ML/MIN/{1.73_M2}
GFR SERPL CREATININE-BSD FRML MDRD: ABNORMAL ML/MIN/{1.73_M2}
GLUCOSE BLD-MCNC: 124 MG/DL (ref 75–110)
GLUCOSE BLD-MCNC: 128 MG/DL (ref 75–110)
GLUCOSE BLD-MCNC: 131 MG/DL (ref 75–110)
GLUCOSE BLD-MCNC: 84 MG/DL (ref 75–110)
GLUCOSE BLD-MCNC: 87 MG/DL (ref 70–99)
HCT VFR BLD CALC: 45.5 % (ref 40.7–50.3)
HEMOGLOBIN: 15.7 G/DL (ref 13–17)
MCH RBC QN AUTO: 32 PG (ref 25.2–33.5)
MCHC RBC AUTO-ENTMCNC: 34.5 G/DL (ref 28.4–34.8)
MCV RBC AUTO: 92.7 FL (ref 82.6–102.9)
NRBC AUTOMATED: 0 PER 100 WBC
PDW BLD-RTO: 13.3 % (ref 11.8–14.4)
PHOSPHORUS: 3.1 MG/DL (ref 2.5–4.5)
PLATELET # BLD: NORMAL K/UL (ref 138–453)
PLATELET, FLUORESCENCE: NORMAL K/UL (ref 138–453)
PMV BLD AUTO: NORMAL FL (ref 8.1–13.5)
POTASSIUM SERPL-SCNC: 4.4 MMOL/L (ref 3.7–5.3)
RBC # BLD: 4.91 M/UL (ref 4.21–5.77)
SODIUM BLD-SCNC: 135 MMOL/L (ref 135–144)
WBC # BLD: 8.2 K/UL (ref 3.5–11.3)

## 2020-12-23 PROCEDURE — 85027 COMPLETE CBC AUTOMATED: CPT

## 2020-12-23 PROCEDURE — 2060000000 HC ICU INTERMEDIATE R&B

## 2020-12-23 PROCEDURE — 2500000003 HC RX 250 WO HCPCS: Performed by: PHYSICIAN ASSISTANT

## 2020-12-23 PROCEDURE — 99232 SBSQ HOSP IP/OBS MODERATE 35: CPT | Performed by: PHYSICAL MEDICINE & REHABILITATION

## 2020-12-23 PROCEDURE — 82947 ASSAY GLUCOSE BLOOD QUANT: CPT

## 2020-12-23 PROCEDURE — 6360000002 HC RX W HCPCS: Performed by: STUDENT IN AN ORGANIZED HEALTH CARE EDUCATION/TRAINING PROGRAM

## 2020-12-23 PROCEDURE — 80048 BASIC METABOLIC PNL TOTAL CA: CPT

## 2020-12-23 PROCEDURE — 99232 SBSQ HOSP IP/OBS MODERATE 35: CPT | Performed by: STUDENT IN AN ORGANIZED HEALTH CARE EDUCATION/TRAINING PROGRAM

## 2020-12-23 PROCEDURE — 6370000000 HC RX 637 (ALT 250 FOR IP): Performed by: PSYCHIATRY & NEUROLOGY

## 2020-12-23 PROCEDURE — 84100 ASSAY OF PHOSPHORUS: CPT

## 2020-12-23 PROCEDURE — 6370000000 HC RX 637 (ALT 250 FOR IP): Performed by: NURSE PRACTITIONER

## 2020-12-23 PROCEDURE — 82330 ASSAY OF CALCIUM: CPT

## 2020-12-23 PROCEDURE — 85055 RETICULATED PLATELET ASSAY: CPT

## 2020-12-23 PROCEDURE — 6370000000 HC RX 637 (ALT 250 FOR IP): Performed by: PHYSICIAN ASSISTANT

## 2020-12-23 PROCEDURE — 2580000003 HC RX 258: Performed by: PHYSICIAN ASSISTANT

## 2020-12-23 PROCEDURE — 36415 COLL VENOUS BLD VENIPUNCTURE: CPT

## 2020-12-23 RX ADMIN — ENOXAPARIN SODIUM 30 MG: 100 INJECTION SUBCUTANEOUS at 08:43

## 2020-12-23 RX ADMIN — DESMOPRESSIN ACETATE 40 MG: 0.2 TABLET ORAL at 21:12

## 2020-12-23 RX ADMIN — STANDARDIZED SENNA CONCENTRATE AND DOCUSATE SODIUM 1 TABLET: 8.6; 5 TABLET ORAL at 08:43

## 2020-12-23 RX ADMIN — SODIUM CHLORIDE, PRESERVATIVE FREE 10 ML: 5 INJECTION INTRAVENOUS at 21:15

## 2020-12-23 RX ADMIN — CARVEDILOL 3.12 MG: 3.12 TABLET, FILM COATED ORAL at 08:43

## 2020-12-23 RX ADMIN — FAMOTIDINE 20 MG: 10 INJECTION INTRAVENOUS at 21:13

## 2020-12-23 RX ADMIN — FAMOTIDINE 20 MG: 10 INJECTION INTRAVENOUS at 08:43

## 2020-12-23 RX ADMIN — DIVALPROEX SODIUM 500 MG: 500 TABLET, EXTENDED RELEASE ORAL at 08:43

## 2020-12-23 RX ADMIN — SODIUM CHLORIDE, PRESERVATIVE FREE 10 ML: 5 INJECTION INTRAVENOUS at 08:44

## 2020-12-23 RX ADMIN — VENLAFAXINE 37.5 MG: 37.5 TABLET ORAL at 08:43

## 2020-12-23 RX ADMIN — DIVALPROEX SODIUM 500 MG: 500 TABLET, EXTENDED RELEASE ORAL at 21:13

## 2020-12-23 RX ADMIN — ENOXAPARIN SODIUM 30 MG: 100 INJECTION SUBCUTANEOUS at 21:12

## 2020-12-23 RX ADMIN — CARVEDILOL 3.12 MG: 3.12 TABLET, FILM COATED ORAL at 17:58

## 2020-12-23 ASSESSMENT — PAIN SCALES - GENERAL: PAINLEVEL_OUTOF10: 0

## 2020-12-23 NOTE — PROGRESS NOTES
Complex conversational exchange: Pt with occasional paraphasias and tangential/irrelevent responses, frequent word finding difficulty. Pt w/ occasional disfluencies (e.g., sound repetitions on initial phoneme /m/ noted x2 in session); unclear if these were present at baseline.          Objective:  /65   Pulse 70   Temp 97.6 °F (36.4 °C) (Oral)   Resp 17   Ht 6' 2\" (1.88 m)   Wt 200 lb (90.7 kg)   SpO2 97%   BMI 25.68 kg/m²  I Body mass index is 25.68 kg/m². I   Wt Readings from Last 1 Encounters:   20 200 lb (90.7 kg)      Temp (24hrs), Av.7 °F (36.5 °C), Min:97.4 °F (36.3 °C), Max:97.9 °F (36.6 °C)         GEN: well developed, well nourished, no acute distress  HEENT: Normocephalic atraumatic, EOMI, mucous membranes pink and moist ,scalp incision clean, decreased hearing  CV: RRR, no murmurs, rubs or gallops  PULM: CTAB, no rales or rhonchi. Respirations WNL and unlabored  ABD: soft, NT, ND, +BS and equal  NEURO: A&O x3. Sensation intact to light touch. MSK: 4-5 upper extremities 3-4/5 lower extremities  EXTREMITIES: No calf tenderness to palpation bilaterally. No edema BLEs  SKIN: warm dry and intact with good turgor  PSYCH: appropriately interactive. Affect WNL.           Medications   Scheduled Meds:   divalproex  500 mg Oral BID    venlafaxine  37.5 mg Oral Daily    carvedilol  3.125 mg Oral BID     sodium chloride flush  10 mL Intravenous 2 times per day    sennosides-docusate sodium  1 tablet Oral BID    enoxaparin  30 mg Subcutaneous BID    [Held by provider] potassium bicarb-citric acid  40 mEq Oral Daily    famotidine (PEPCID) injection  20 mg Intravenous BID    atorvastatin  40 mg Oral Nightly     Continuous Infusions:   dextrose PRN Meds:.labetalol, sodium chloride flush, sodium chloride flush, acetaminophen, oxyCODONE **OR** oxyCODONE, morphine **OR** morphine, magnesium hydroxide, bisacodyl, promethazine **OR** ondansetron, glucose, dextrose, glucagon (rDNA), dextrose, sodium chloride flush     Diagnostics:     CBC:   Recent Labs     12/21/20  0439 12/22/20  0401 12/23/20  0653   WBC 9.3 9.9 8.2   RBC 4.86 5.11 4.91   HGB 15.1 15.6 15.7   HCT 46.6 48.0 45.5   MCV 95.9 93.9 92.7   RDW 13.0 13.2 13.3    171 See Reflexed IPF Result     BMP:   Recent Labs     12/21/20  0439 12/22/20  0401 12/23/20  0653   * 133* 135   K 4.2 4.3 4.4    104 101   CO2 21 21 20   PHOS 3.0 3.2 3.1   BUN 26* 25* 26*   CREATININE 0.79 0.84 0.88     BNP: No results for input(s): BNP in the last 72 hours. PT/INR: No results for input(s): PROTIME, INR in the last 72 hours. APTT: No results for input(s): APTT in the last 72 hours. CARDIAC ENZYMES: No results for input(s): CKMB, CKMBINDEX, TROPONINT in the last 72 hours. Invalid input(s): CKTOTAL;3  FASTING LIPID PANEL:  Lab Results   Component Value Date    HDL 37 (L) 12/09/2020     LIVER PROFILE:   No results for input(s): AST, ALT, ALB, BILIDIR, BILITOT, ALKPHOS in the last 72 hours. I/O (24Hr): No intake or output data in the 24 hours ending 12/23/20 1301    Glu last 24 hour  Recent Labs     12/23/20  0817 12/23/20  1111   POCGLU 84 128*       No results for input(s): CLARITYU, COLORU, PHUR, SPECGRAV, PROTEINU, RBCUA, BLOODU, BACTERIA, NITRU, WBCUA, LEUKOCYTESUR, YEAST, GLUCOSEU, BILIRUBINUR in the last 72 hours. Impression: Mr. Lawanda Stephenson is a 66 y.o.  male with a history of <principal problem not specified>     1.  Left posterior temporal mass, change in mental status, left facial droopplan for resection/biopsy 12/14 Decadron taper completed   2. Seizure keep for 6 months, now on Depakoteadjusted to help with mood stabilization  3.   Aphasia  4. hard of hearing 4.  GERDPepcid  5. GBS grade 4oncology has discussed treatment options chemoradiation, family deciding on treatment versus palliative, family wants hospice, patient would like [facility] rehab and treatment  6. Pain, Roxicodone,morphineminimal use of narcotics has not used morphine Tylenol  7. HypertensionCoreg  8. DepressionEffexor     Recommendations:  1. Diagnosis: Left temporal mass with left facial droop, change in mental status, aphasia, GBS grade 4  2. Therapy:  Has PT/OT and speech need  3. Medical  Necessity: As above  4. Support: Clarify, will need 24/7 due to cognitive deficitsnoted daughter was planning to move in however she has Covidclarify support has family wanted hospice, [facility], however now patient notes he does not want her to move in.  5. Rehab recommendation: would benefit from acute inpatient rehabilitation when medically ready, due to due to cognitive deficits will need supervision on discharge, also if a begins chemoradiation or if there is progression will need support  6. DVT proph: Neptali Rendon MD       This note is created with the assistance of a speech recognition program.  While intending to generate a document that actually reflects the content of the visit, the document can still have some errors including those of syntax and sound a like substitutions which may escape proof reading.   In such instances, actual meaning can be extrapolated by contextual diversion

## 2020-12-23 NOTE — PLAN OF CARE
Problem: Falls - Risk of:  Goal: Will remain free from falls  Description: Will remain free from falls  Outcome: Met This Shift  Note: Patient remained free from injury. Patient verbalized understanding of need for the safety precautions. Demonstrates proper use of assistive devices. Bed remains in the lowest position. Call light remains within reach. [place] [facility] in use. Goal: Absence of physical injury  Description: Absence of physical injury  Outcome: Met This Shift     Problem: Skin Integrity:  Goal: Will show no infection signs and symptoms  Description: Will show no infection signs and symptoms  Outcome: Met This Shift  Note: Vitals stable. Patient encouraged to turn every 2 hours while in bed. No signs of new skin breakdown. Skin assessed.    Goal: Absence of new skin breakdown  Description: Absence of new skin breakdown  Outcome: Met This Shift

## 2020-12-23 NOTE — PROGRESS NOTES
.. PALLIATIVE CARE TEAM    Patient: Lorelei Severs  Room: 7358/1101-51    Reason For Consult   Goals of care evaluation  Distress management  Symptom Management  Guidance and support  Facilitate communications  Assistance in coordinating care  Recommendations for the above    Impression: Lorelei Severs is a 66y.o. year old male with  has no past medical history on file. .  Currently hospitalized for the management of brain mass. The Palliative Care Team is following to assist with goals of care, code status discussion and family support. Code Status  Full Code    Vital Signs:  /74   Pulse 74   Temp 97.6 °F (36.4 °C) (Oral)   Resp 17   Ht 6' 2\" (1.88 m)   Wt 200 lb (90.7 kg)   SpO2 96%   BMI 25.68 kg/m²     Patient Active Problem List   Diagnosis    Brain mass    Acute encephalopathy    Cerebral edema (HCC)    Thrombocytopenia (HCC)    GBM (glioblastoma multiforme) ([facility] [address]       Palliative Interaction:The patient is in bed and he appears very comfortable, and he is alert to person, place and he remembers that he had surgery and that it is the end of the month. He is [place] and has expressive dysphasia. GOALS OF CARE:  He states that he going to rehab, and that he will have cancer treatment if he can. He states\" I'm not afraid if that's what God wants, I am ready. \" He states that he has 2 daughters Rafiq Hogan and Kandace Dakins. He states that he lives alone and has since his wife  5 years ago. He states that his daughter Rafiq Hogan will not live with him, and that she has Covid at this time. CODE STATUS DISCUSSION:  I then ask him about what his wishes would be if his heart would stop, or he would stop breathing, and if he would want CPR, shocking or to be on a machine, and he stated\" no I would not want that . \" He again stated\" I am ready when God wants to take me, and I am not afraid.  \" I tell him that a doctor will talk with him and verify that his wish is not to have CPR or be on a machine. I offer much emotional support to him and I pray with him as requested at the bedside. I update the bedside nurse Melisa Jarvis about the patient's wishes for code status , and that he does not want to be on a machine and that he stated\" I am ready when God takes me. \"   I talked about the [facility] and no intubation. I explained to the patient that then he can still pursue cancer treatment as well if he is able. Melisa Jarvis states that she will perfect serve neurology and update him on the patient's wishes. Diane Avila R.N know that if the order cannot be placed, that I will check again tomorrow and speak with the medical team.       Will follow for goals of care, code status discussion and support. Goals/Plan of care  Education/support to patient  Providing support for coping/adaptation/distress of patient  Continue with current plan of care  Code status clarified: Full Code  Validating patient/family distress  Continued communication updates  Patient is alert to person, place and circumstance. He does know that it's the end of the month , and that he does have expressive dysphasia. He stated that he would not want to be on a machine, and that \" I am ready when God takes me. '\" Will follow for goals of care, code status discussion and family support.      Electronically signed by   Hunter Donato RN  Palliative Care Team  on 12/23/2020 at 4:15 PM

## 2020-12-23 NOTE — CARE COORDINATION
Spoke to patient at bedside, we discussed his plan for post ARU. He tells me that he lives alone and would like to go homeme. Asked if someone could come and stay with him post ARU. He informs me that his daughter states that she wants to me in but he does not want to have her move in. She states she needs her own place. I asked if he would ave 24 our care post ARU and informs that he does not know.

## 2020-12-23 NOTE — PROGRESS NOTES
Laboratory Testing:  Recent Results (from the past 24 hour(s))   Basic Metabolic Panel    Collection Time: 12/23/20  6:53 AM   Result Value Ref Range    Glucose 87 70 - 99 mg/dL    BUN 26 (H) 8 - 23 mg/dL    CREATININE 0.88 0.70 - 1.20 mg/dL    Bun/Cre Ratio NOT REPORTED 9 - 20    Calcium 8.2 (L) 8.6 - 10.4 mg/dL    Sodium 135 135 - 144 mmol/L    Potassium 4.4 3.7 - 5.3 mmol/L    Chloride 101 98 - 107 mmol/L    CO2 20 20 - 31 mmol/L    Anion Gap 14 9 - 17 mmol/L    GFR Non-African American >60 >60 mL/min    GFR African American >60 >60 mL/min    GFR Comment          GFR Staging NOT REPORTED    CBC    Collection Time: 12/23/20  6:53 AM   Result Value Ref Range    WBC 8.2 3.5 - 11.3 k/uL    RBC 4.91 4.21 - 5.77 m/uL    Hemoglobin 15.7 13.0 - 17.0 g/dL    Hematocrit 45.5 40.7 - 50.3 %    MCV 92.7 82.6 - 102.9 fL    MCH 32.0 25.2 - 33.5 pg    MCHC 34.5 28.4 - 34.8 g/dL    RDW 13.3 11.8 - 14.4 %    Platelets See Reflexed IPF Result 138 - 453 k/uL    MPV NOT REPORTED 8.1 - 13.5 fL    NRBC Automated 0.0 0.0 per 100 WBC   PHOSPHORUS    Collection Time: 12/23/20  6:53 AM   Result Value Ref Range    Phosphorus 3.1 2.5 - 4.5 mg/dL   CALCIUM, IONIZED    Collection Time: 12/23/20  6:53 AM   Result Value Ref Range    Calcium, Ion 1.12 (L) 1.13 - 1.33 mmol/L   Immature Platelet Fraction    Collection Time: 12/23/20  6:53 AM   Result Value Ref Range    Platelet, Fluorescence Platelet clumps present, count appears adequate.  138 - 453 k/uL   POC Glucose Fingerstick    Collection Time: 12/23/20  8:17 AM   Result Value Ref Range    POC Glucose 84 75 - 110 mg/dL   POC Glucose Fingerstick    Collection Time: 12/23/20 11:11 AM   Result Value Ref Range    POC Glucose 128 (H) 75 - 110 mg/dL         Assessment :      Primary Problem  <principal problem not specified>    Active Hospital Problems    Diagnosis Date Noted    Thrombocytopenia ([facility] [D69.6]     GBM (glioblastoma multiforme) ([facility] [C71.9]     Acute encephalopathy [G93.40]  Cerebral edema (HCC) [G93.6]     Brain mass [G93.89] 12/09/2020   77year-old status post resection of temporal mass which was consistent with GBM. Oncology is on board, patient's dysarthria has continued to improve. Awaiting decision by family in regards to acute rehab and possibly discharge to home. Plan:     NeurologicalGBM s/p resection. DC planning. Continue to monitor for neurological changes. Continue Depakote 500 every 12. Seizure precautions as directed  Follow-up with neurology as outpatient. Follow-up with neurosurgery as outpatient  Continue statin therapy      Follow-up further recommendations after discussing the case with attending  The plan was discussed with the patient, patient's family and the medical staff. Consultations:   IP CONSULT TO STROKE TEAM  IP CONSULT TO NEUROSURGERY  IP CONSULT TO NEUROCRITICAL CARE  IP CONSULT TO PHYSICAL MEDICINE REHAB  IP CONSULT TO CARDIOLOGY  IP CONSULT TO PHYSICAL MEDICINE REHAB  IP CONSULT TO HEM/ONC  IP CONSULT TO HEM/ONC  IP CONSULT TO NEUROLOGY  IP CONSULT TO PALLIATIVE CARE  IP CONSULT TO ETHICS    Patient is admitted as inpatient status because of co-morbidities listed above, severity of signs and symptoms as outlined, requirement for current medical therapies and most importantly because of direct risk to patient if care not provided in a hospital setting. Luh Smiley MD  12/23/2020  2:40 PM    Copy sent to Dr. Gayle primary care provider on file.

## 2020-12-23 NOTE — PROGRESS NOTES
NEUROSURGERY INPATIENT PROGRESS NOTE    12/23/2020         Subjective: no acute events overnight. No acute complaints this morning     No current facility-administered medications on file prior to encounter. No current outpatient medications on file prior to encounter. Allergies: Torsten Harris has No Known Allergies. History reviewed. No pertinent past medical history. Past Surgical History:   Procedure Laterality Date    CRANIOTOMY Left 12/14/2020    temporal for tumor    CRANIOTOMY Left 12/14/2020    TEMPORAL CRANIOTOMY FOR TUMOR, IMAGE GUIDED, AWAKE performed by Pablo Valdez DO at [place] [address]       Medications:     divalproex  500 mg Oral BID    venlafaxine  37.5 mg Oral Daily    carvedilol  3.125 mg Oral BID WC    sodium chloride flush  10 mL Intravenous 2 times per day    sennosides-docusate sodium  1 tablet Oral BID    enoxaparin  30 mg Subcutaneous BID    [Held by provider] potassium bicarb-citric acid  40 mEq Oral Daily    famotidine (PEPCID) injection  20 mg Intravenous BID    atorvastatin  40 mg Oral Nightly     PRN Meds include: labetalol, sodium chloride flush, sodium chloride flush, acetaminophen, oxyCODONE **OR** oxyCODONE, morphine **OR** morphine, magnesium hydroxide, bisacodyl, promethazine **OR** ondansetron, glucose, dextrose, glucagon (rDNA), dextrose, sodium chloride flush    Objective:   /65   Pulse 70   Temp 97.6 °F (36.4 °C) (Oral)   Resp 17   Ht 6' 2\" (1.88 m)   Wt 200 lb (90.7 kg)   SpO2 97%   BMI 25.68 kg/m²     Blood pressure range: Systolic (83JIZ), QQB:981 , Min:106 , TOS:371   ; Diastolic (48QDV), XFA:62, Min:55, Max:72          NEUROLOGIC EXAMINATION  Physical Exam  Vitals signs and nursing note reviewed. Constitutional:       Appearance: Normal appearance. HENT:      Head: Normocephalic and atraumatic. Comments: Clean and dry incision   Neck:      Musculoskeletal: Normal range of motion and neck supple.    Cardiovascular:

## 2020-12-23 NOTE — PLAN OF CARE
Problem: Falls - Risk of:  Goal: Will remain free from falls  Description: Will remain free from falls  12/23/2020 1827 by Brian Nolan RN  Outcome: Ongoing  12/23/2020 0453 by Fransico Castrejon RN  Outcome: Met This Shift  Note: Patient remained free from injury. Patient verbalized understanding of need for the safety precautions. Demonstrates proper use of assistive devices. Bed remains in the lowest position. Call light remains within reach. [place] [facility] in use. Patient remained free from injury. Patient verbalized understanding of need for the safety precautions. Demonstrates proper use of assistive devices. Bed remains in the lowest position. Call light remains within reach. [place] Program in use. Goal: Absence of physical injury  Description: Absence of physical injury  12/23/2020 1827 by Brian Nolan RN  Outcome: Ongoing  12/23/2020 0453 by Fransico Castrejon RN  Outcome: Met This Shift     Problem: Skin Integrity:  Goal: Will show no infection signs and symptoms  Description: Will show no infection signs and symptoms  12/23/2020 1827 by Brian Nolan RN  Outcome: Ongoing  12/23/2020 0453 by Fransico Castrejon RN  Outcome: Met This Shift  Note: Vitals stable. Patient encouraged to turn every 2 hours while in bed. No signs of new skin breakdown. Skin assessed.    Goal: Absence of new skin breakdown  Description: Absence of new skin breakdown  12/23/2020 1827 by Brian Nolan RN  Outcome: Ongoing  12/23/2020 0453 by Fransico Castrejon RN  Outcome: Met This Shift

## 2020-12-23 NOTE — PROGRESS NOTES
Speech Language Pathology  Speech Language Pathology  [address]     Speech Language Treatment Note    Date: 12/23/2020  Patients Name: Lawanda Stephenson  MRN: [id number]  Diagnosis:   Patient Active Problem List   Diagnosis Code    Brain mass G93.89    Acute encephalopathy G93.40    Cerebral edema (HCC) G93.6    Thrombocytopenia (HCC) D69.6    GBM (glioblastoma multiforme) ([facility] C71.9     Pain: 0/10    Speech and Language Treatment  Treatment time: 10:19-10:47    Subjective: [x] Alert [x] Cooperative     [] Confused     [] Agitated    [] Lethargic    Objective/Assessment:    Attention: Maintained throughout session, pt independently minimized distractions    Auditory Comprehension:   1-step commands: 100% independently   2-step commands: 4/6 increased to 6/6 with max verbal and visual cues   Yes/No, moderate: 4/9 increased to 9/9 with mod verbal and visual cues   Basic yes/no: 4/5 increased to 5/5 with min verbal cues     Verbal Expression: Pt with increased word finding difficulties during this date's session. However, ST utilized multi-syllabic and more difficult (e.g., more abstract) stimuli. ST reviewed circumlocution strategies with pt; strategies need reinforcement. Pt continues to state \"I know it, I just can't think of the word,\" and performance increased with verbal encouragement from ST to complete tasks. Object ID: 3/6 increased to 6/6 with max verbal, visual, and part-word cues     Picture ID: 3/10 increased to 6/10 with max verbal, visual, and phonemic cues     Automatic speech: 1/3 increased to 3/3 with min-mod semantic and eventual part-word cues (tasks were counting from 1-10, MATT, and LOREN).        [address]- Questions: 2/6 increased to 4/6 with mod-max semantic cues

## 2020-12-24 LAB
ANION GAP SERPL CALCULATED.3IONS-SCNC: 9 MMOL/L (ref 9–17)
BUN BLDV-MCNC: 22 MG/DL (ref 8–23)
BUN/CREAT BLD: ABNORMAL (ref 9–20)
CALCIUM IONIZED: 1.1 MMOL/L (ref 1.13–1.33)
CALCIUM SERPL-MCNC: 8.3 MG/DL (ref 8.6–10.4)
CHLORIDE BLD-SCNC: 104 MMOL/L (ref 98–107)
CO2: 21 MMOL/L (ref 20–31)
CREAT SERPL-MCNC: 0.83 MG/DL (ref 0.7–1.2)
GFR AFRICAN AMERICAN: >60 ML/MIN
GFR NON-AFRICAN AMERICAN: >60 ML/MIN
GFR SERPL CREATININE-BSD FRML MDRD: ABNORMAL ML/MIN/{1.73_M2}
GFR SERPL CREATININE-BSD FRML MDRD: ABNORMAL ML/MIN/{1.73_M2}
GLUCOSE BLD-MCNC: 98 MG/DL (ref 70–99)
HCT VFR BLD CALC: 47 % (ref 40.7–50.3)
HEMOGLOBIN: 15.1 G/DL (ref 13–17)
MCH RBC QN AUTO: 30.9 PG (ref 25.2–33.5)
MCHC RBC AUTO-ENTMCNC: 32.1 G/DL (ref 28.4–34.8)
MCV RBC AUTO: 96.3 FL (ref 82.6–102.9)
NRBC AUTOMATED: 0 PER 100 WBC
PDW BLD-RTO: 13.3 % (ref 11.8–14.4)
PHOSPHORUS: 2.9 MG/DL (ref 2.5–4.5)
PLATELET # BLD: 143 K/UL (ref 138–453)
PMV BLD AUTO: 9.4 FL (ref 8.1–13.5)
POTASSIUM SERPL-SCNC: 4.4 MMOL/L (ref 3.7–5.3)
RBC # BLD: 4.88 M/UL (ref 4.21–5.77)
SODIUM BLD-SCNC: 134 MMOL/L (ref 135–144)
WBC # BLD: 7.1 K/UL (ref 3.5–11.3)

## 2020-12-24 PROCEDURE — 92507 TX SP LANG VOICE COMM INDIV: CPT

## 2020-12-24 PROCEDURE — 82330 ASSAY OF CALCIUM: CPT

## 2020-12-24 PROCEDURE — 97116 GAIT TRAINING THERAPY: CPT

## 2020-12-24 PROCEDURE — 36415 COLL VENOUS BLD VENIPUNCTURE: CPT

## 2020-12-24 PROCEDURE — 6370000000 HC RX 637 (ALT 250 FOR IP): Performed by: PHYSICIAN ASSISTANT

## 2020-12-24 PROCEDURE — 6370000000 HC RX 637 (ALT 250 FOR IP): Performed by: NURSE PRACTITIONER

## 2020-12-24 PROCEDURE — 84100 ASSAY OF PHOSPHORUS: CPT

## 2020-12-24 PROCEDURE — 97110 THERAPEUTIC EXERCISES: CPT

## 2020-12-24 PROCEDURE — 6370000000 HC RX 637 (ALT 250 FOR IP): Performed by: PSYCHIATRY & NEUROLOGY

## 2020-12-24 PROCEDURE — 85027 COMPLETE CBC AUTOMATED: CPT

## 2020-12-24 PROCEDURE — 99232 SBSQ HOSP IP/OBS MODERATE 35: CPT | Performed by: STUDENT IN AN ORGANIZED HEALTH CARE EDUCATION/TRAINING PROGRAM

## 2020-12-24 PROCEDURE — 97535 SELF CARE MNGMENT TRAINING: CPT

## 2020-12-24 PROCEDURE — 2580000003 HC RX 258: Performed by: PHYSICIAN ASSISTANT

## 2020-12-24 PROCEDURE — 2500000003 HC RX 250 WO HCPCS: Performed by: PHYSICIAN ASSISTANT

## 2020-12-24 PROCEDURE — APPSS15 APP SPLIT SHARED TIME 0-15 MINUTES: Performed by: REGISTERED NURSE

## 2020-12-24 PROCEDURE — 80048 BASIC METABOLIC PNL TOTAL CA: CPT

## 2020-12-24 PROCEDURE — 6360000002 HC RX W HCPCS: Performed by: STUDENT IN AN ORGANIZED HEALTH CARE EDUCATION/TRAINING PROGRAM

## 2020-12-24 PROCEDURE — 2060000000 HC ICU INTERMEDIATE R&B

## 2020-12-24 RX ADMIN — SODIUM CHLORIDE, PRESERVATIVE FREE 10 ML: 5 INJECTION INTRAVENOUS at 22:02

## 2020-12-24 RX ADMIN — FAMOTIDINE 20 MG: 10 INJECTION INTRAVENOUS at 22:01

## 2020-12-24 RX ADMIN — CARVEDILOL 3.12 MG: 3.12 TABLET, FILM COATED ORAL at 17:48

## 2020-12-24 RX ADMIN — DESMOPRESSIN ACETATE 40 MG: 0.2 TABLET ORAL at 22:01

## 2020-12-24 RX ADMIN — SODIUM CHLORIDE, PRESERVATIVE FREE 10 ML: 5 INJECTION INTRAVENOUS at 08:40

## 2020-12-24 RX ADMIN — VENLAFAXINE 37.5 MG: 37.5 TABLET ORAL at 08:39

## 2020-12-24 RX ADMIN — ENOXAPARIN SODIUM 30 MG: 100 INJECTION SUBCUTANEOUS at 22:02

## 2020-12-24 RX ADMIN — DIVALPROEX SODIUM 500 MG: 500 TABLET, EXTENDED RELEASE ORAL at 22:01

## 2020-12-24 RX ADMIN — DIVALPROEX SODIUM 500 MG: 500 TABLET, EXTENDED RELEASE ORAL at 08:39

## 2020-12-24 RX ADMIN — ENOXAPARIN SODIUM 30 MG: 100 INJECTION SUBCUTANEOUS at 10:35

## 2020-12-24 RX ADMIN — FAMOTIDINE 20 MG: 10 INJECTION INTRAVENOUS at 08:39

## 2020-12-24 RX ADMIN — CARVEDILOL 3.12 MG: 3.12 TABLET, FILM COATED ORAL at 08:39

## 2020-12-24 ASSESSMENT — PAIN SCALES - GENERAL
PAINLEVEL_OUTOF10: 0

## 2020-12-24 NOTE — CARE COORDINATION
Called NIRU Meyer at [facility] patients first choice.   She informs me that they will not be able to accept or deny until Monday

## 2020-12-24 NOTE — PROGRESS NOTES
Physical Therapy  Facility/Department: [facility] [place] NEURO  Daily Treatment Note  NAME: Torsten Harris  : 1945  MRN: [id number]    Date of Service: 2020    Discharge Recommendations:  Patient would benefit from continued therapy after discharge   PT Equipment Recommendations  Other: CTA    Assessment   Body structures, Functions, Activity limitations: Decreased functional mobility ; Decreased posture;Decreased endurance;Decreased ROM; Decreased strength;Decreased safe awareness;Decreased balance  Assessment: Pt amb ~100 ft with RW and CGA. Would benefit from continued PT after d/c. Prognosis: Good  REQUIRES PT FOLLOW UP: Yes  Activity Tolerance  Activity Tolerance: Patient limited by fatigue;Patient limited by endurance; Patient Tolerated treatment well     Patient Diagnosis(es): The primary encounter diagnosis was Brain mass. A diagnosis of Светлана coma scale total score 3-8, in the field (EMT or ambulance) [facility]) was also pertinent to this visit. has no past medical history on file. has a past surgical history that includes craniotomy (Left, 2020) and craniotomy (Left, 2020). Restrictions  Restrictions/Precautions  Restrictions/Precautions: General Precautions, Surgical Protocols, Fall Risk, Up as Tolerated  Required Braces or Orthoses?: No  Position Activity Restriction  Other position/activity restrictions: Up with assist  Subjective   General  Chart Reviewed: Yes  Response To Previous Treatment: Patient with no complaints from previous session. Family / Caregiver Present: No  Subjective  Subjective: RN and pt agreeable to PT. Pt resting in chair upon arrival with no c/o pain.   General Comment  Comments: retired in recliner after session  Pain Screening  Patient Currently in Pain: Denies  Pain Assessment  Pain Assessment: 0-10  Pain Level: 0  Vital Signs  Patient Currently in Pain: Denies       Orientation  Orientation  Overall Orientation Status: Within Functional Limits  Cognition Objective   Bed mobility  Comment: in chair upon arrival  Transfers  Sit to Stand: Contact guard assistance  Stand to sit: Contact guard assistance  Ambulation  Ambulation?: Yes  Ambulation 1  Surface: level tile  Device: Rolling Walker  Assistance: Minimal assistance  Quality of Gait: decreased lux, flexed posture, no LOB  Gait Deviations: Slow Lux;Decreased step length;Decreased step height  Distance: ~100ft  Stairs/Curb  Stairs?: No     Balance  Posture: Good  Sitting - Static: Good  Sitting - Dynamic: Good  Standing - Static: Fair;+  Standing - Dynamic: Fair  Comments: Standing balance assessed w/ RW;  Exercises  Seated LE exercise program: Long Arc Quads, hip abduction/adduction, heel/toe raises, and marches. Reps: x15  Goals  Short term goals  Time Frame for Short term goals: 15  Short term goal 1: Pt to perform bed mobility CGA  Short term goal 2: Pt to demonstrate functional transfers CGA  Short term goal 3: Ambulate 100ft w/ least restrictive AD CGA  Short term goal 4: Demonstrate standing balance of good - to decrease fall risk  Patient Goals   Patient goals :  To go home    Plan    Plan  Times per week: 5-6x/week  Current Treatment Recommendations: Strengthening, Transfer Training, Endurance Training, Patient/Caregiver Education & Training, ROM, Balance Training, Gait Training, Home Exercise Program, Functional Mobility Training, Stair training, Safety Education & Training  Safety Devices  Type of devices: Gait belt, Nurse notified, Call light within reach, Patient at risk for falls, All fall risk precautions in place  Restraints  Initially in place: No     Therapy Time   Individual Concurrent Group Co-treatment   Time In 1100         Time Out 1125         Minutes 25         Timed Code Treatment Minutes: [address] [place], [facility]

## 2020-12-24 NOTE — PROGRESS NOTES
Neurosurgery SUMAYA/Resident    Daily Progress Note   Chief Complaint   Patient presents with   [facility] Cerebrovascular Accident     12/24/2020  4:29 PM    Chart reviewed. No acute events overnight. No new complaints. Denies headache. Vitals:    12/24/20 0738 12/24/20 0800 12/24/20 1157 12/24/20 1527   BP: 114/61 122/64 118/61 133/60   Pulse: 58 58 68 74   Resp: 17  19 14   Temp: 98.5 °F (36.9 °C)  98 °F (36.7 °C) 97.6 °F (36.4 °C)   TempSrc: Axillary  Oral Oral   SpO2: 93%  98% 97%   Weight:       Height:           PE:   AOx3, mild aphasia  Motor   Moves all extremities equally    Incision left cranial site open to air, no redness or drainiage      Lab Results   Component Value Date    WBC 7.1 12/24/2020    HGB 15.1 12/24/2020    HCT 47.0 12/24/2020     12/24/2020    HDL 37 (L) 12/09/2020    ALT 88 (H) 12/19/2020    AST 25 12/19/2020     (L) 12/24/2020    K 4.4 12/24/2020     12/24/2020    CREATININE 0.83 12/24/2020    BUN 22 12/24/2020    CO2 21 12/24/2020    TSH 2.76 12/09/2020    INR 1.0 12/09/2020    LABA1C 5.5 12/18/2020         A/P  66 y.o. male who presents with left frontotemporal mass  POD#9 resection of mass    - will sign off  - follow up 2 weeks post op for incision site check      Please contact neurosurgery with any changes in patients neurologic status.        Humza Fairbanks CNP  12/24/20  4:29 PM

## 2020-12-24 NOTE — PROGRESS NOTES
Neurology Resident Progress Note      SUBJECTIVE:  This is a 66 y.o.  male admitted 12/9/2020 for Brain mass [G93.89]  This is a follow-up neurology progress note. The patient was seen and examined and the chart was reviewed. There were no acute events overnight. ROS  Constitutional: no fever, chills, fatigue  HENT: No change in vision or hearing   Respiratory: No cough, SOB, wheezing. Cardiovascular:  No chest pain, palpitations, leg swelling. Gastrointestinal: No nausea, vomiting, diarrhea. Genitourinary: No increased frequency, urgency. Musculoskeletal: No myalgia or arthralgia. Skin: No rashes or scarring or bruises. Neurological: No headache, paresthesia, or focal weakness. Endo/Heme/Allergies: Negative for itchy eyes or runny nose. Psychiatric/Behavioral: No anxiety or depressed mood. HPI  See H&P     divalproex  500 mg Oral BID    venlafaxine  37.5 mg Oral Daily    carvedilol  3.125 mg Oral BID WC    sodium chloride flush  10 mL Intravenous 2 times per day    sennosides-docusate sodium  1 tablet Oral BID    enoxaparin  30 mg Subcutaneous BID    [Held by provider] potassium bicarb-citric acid  40 mEq Oral Daily    famotidine (PEPCID) injection  20 mg Intravenous BID    atorvastatin  40 mg Oral Nightly       History reviewed. No pertinent past medical history. Past Surgical History:   Procedure Laterality Date    CRANIOTOMY Left 12/14/2020    temporal for tumor    CRANIOTOMY Left 12/14/2020    TEMPORAL CRANIOTOMY FOR TUMOR, IMAGE GUIDED, AWAKE performed by Jose Gomez DO at [address] [place] [address] Drive:      Blood pressure 114/61, pulse 58, temperature 98.5 °F (36.9 °C), temperature source Axillary, resp. rate 17, height 6' 2\" (1.88 m), weight 200 lb (90.7 kg), SpO2 93 %. General Examination    General Resting comfortably in bed   Head Normocephalic, without obvious abnormality   Neck Supple, symmetrical. Good ROM. No midline or paraspinal tenderness. Lungs Respirations unlabored, no wheezing   Chest Wall No deformity   Heart RRR, no murmur   Abdomen Soft. Non-tender, non-distended   Extremities No cyanosis or edema or warmth. Pulses 2+ and symmetric   Skin: Skin  turgor normal, no rashes or lesions     Mental status  Speech  alert, oriented x2, impaired memory, expressive aphasia, some dysarthria   Cranial nerves   II - VFF, visual threat intact  III, IV, VI - extra-ocular muscles full. No nystagmus. Pupils symmetric and responsive.    V - sensation symmetric         VII -  No facial droop or asymmetric NLF  VIII - intact hearing to conversational tone          IX, X - symmetrical palate elevation   XI - 5/5 strength symmetric  XII - tongue midline   Motor function  Strength: grossly 5/5 in b/l              Deltoid, biceps, triceps, wrist flexion, wrist extension             Hip flexion/extension, knee flexion/extension, plantar flexion  Bulk: grossly normal no atrophy  Tone: symmetric b/l arms and legs  Abnormal movements: No abnormal movements or tremor   Sensory function Symmetric to touch in all extremities bilaterally   Cerebellar  intact fine motor control over upper limbs   Reflex function DTR:        2+ b/l symmetric in biceps, brachioradialis, patellar, calcaneal  Babinski b/l plantar downgoing   Gait                  Not assessed       Investigations:      Laboratory Testing:  Recent Results (from the past 24 hour(s))   POC Glucose Fingerstick    Collection Time: 12/23/20 11:11 AM   Result Value Ref Range    POC Glucose 128 (H) 75 - 110 mg/dL   POC Glucose Fingerstick    Collection Time: 12/23/20  3:45 PM   Result Value Ref Range    POC Glucose 131 (H) 75 - 110 mg/dL   POC Glucose Fingerstick    Collection Time: 12/23/20  8:02 PM   Result Value Ref Range    POC Glucose 124 (H) 75 - 110 mg/dL   Basic Metabolic Panel    Collection Time: 12/24/20  4:51 AM   Result Value Ref Range    Glucose 98 70 - 99 mg/dL    BUN 22 8 - 23 mg/dL CREATININE 0.83 0.70 - 1.20 mg/dL    Bun/Cre Ratio NOT REPORTED 9 - 20    Calcium 8.3 (L) 8.6 - 10.4 mg/dL    Sodium 134 (L) 135 - 144 mmol/L    Potassium 4.4 3.7 - 5.3 mmol/L    Chloride 104 98 - 107 mmol/L    CO2 21 20 - 31 mmol/L    Anion Gap 9 9 - 17 mmol/L    GFR Non-African American >60 >60 mL/min    GFR African American >60 >60 mL/min    GFR Comment          GFR Staging NOT REPORTED    CBC    Collection Time: 12/24/20  4:51 AM   Result Value Ref Range    WBC 7.1 3.5 - 11.3 k/uL    RBC 4.88 4.21 - 5.77 m/uL    Hemoglobin 15.1 13.0 - 17.0 g/dL    Hematocrit 47.0 40.7 - 50.3 %    MCV 96.3 82.6 - 102.9 fL    MCH 30.9 25.2 - 33.5 pg    MCHC 32.1 28.4 - 34.8 g/dL    RDW 13.3 11.8 - 14.4 %    Platelets 676 101 - 160 k/uL    MPV 9.4 8.1 - 13.5 fL    NRBC Automated 0.0 0.0 per 100 WBC   PHOSPHORUS    Collection Time: 12/24/20  4:51 AM   Result Value Ref Range    Phosphorus 2.9 2.5 - 4.5 mg/dL   CALCIUM, IONIZED    Collection Time: 12/24/20  4:51 AM   Result Value Ref Range    Calcium, Ion 1.10 (L) 1.13 - 1.33 mmol/L       Imaging/Diagnostics:  Echo Complete 2d W Doppler W Color    Result Date: 12/12/2020 Transthoracic Echocardiography Report (TTE)  Patient Name [facility]      Date of Study               12/11/2020               Karissa Polanco   Date of      1945  Gender                      Male  Birth   Age          66 year(s)  Race                           Room Number  6926        Height:                     74 inch, 187.96 cm   Corporate ID [id number]    Weight:                     200 pounds, 90.7 kg  #   Patient Acct [de-identified]   BSA:          2.17 m^2      BMI:      25.68  #                                                              kg/m^2   MR #         [id number]     Holland Rinaldi   Accession #  [id number]  Interpreting Physician      Ruby Jurado   Fellow                   Referring Nurse                           Practitioner   Interpreting             Referring Physician         Vj Magana MD  Fellow  Type of Study   TTE procedure:2D Echocardiogram, M-Mode, Doppler, Color Doppler, Bubble  Study. Procedure Date Date: 12/11/2020 Start: 11:21 AM Study Location: [facility] Technical Quality: Good visualization History / Tech. Comments: Procedure explained to patient. Study done bedside in Neuro ICU. Altered mental status, brain mass Patient Status: Inpatient Height: 74 inches Weight: 200 pounds BSA: 2.17 m^2 BMI: 25.68 kg/m^2 CONCLUSIONS Summary Left ventricle is normal in size. Global left ventricular systolic function is normal. Estimated ejection fraction is 55 % . Normal left ventricular wall thickness. No obvious wall motion abnormality seen.  Signature ----------------------------------------------------------------------------  Electronically signed by Azeb Martinez on 12/11/2020  01:14 PM ---------------------------------------------------------------------------- ----------------------------------------------------------------------------  Electronically signed by Ruby Jurado(Interpreting physician) on 2020 03:30 PM ---------------------------------------------------------------------------- FINDINGS Left Atrium Left atrium is normal in size. Inter-atrial septum appears so be intact. No shunt seen by color Doppler. Negative bubble study, no shunt noted via injection of agitated saline. Left Ventricle Left ventricle is normal in size. Global left ventricular systolic function is normal. Estimated ejection fraction is 55 % . Normal left ventricular wall thickness. No obvious wall motion abnormality seen. Right Atrium Right atrium is normal in size. Right Ventricle Normal right ventricular size and function. Mitral Valve Normal mitral valve structure. No mitral regurgitation. No mitral stenosis. Aortic Valve Aortic valve structure and function normal. Aortic valve is trileaflet. Trivial aortic insufficiency. No aortic stenosis. Tricuspid Valve Normal tricuspid valve leaflets. Trivial tricuspid regurgitation. No tricuspid stenosis. Estimated right ventricular systolic pressure is 27 mmHg. Pulmonic Valve Pulmonic valve is normal in structure and function. No pulmonic insufficiency. No evidence of pulmonic stenosis. Pericardial Effusion No pericardial effusion. Miscellaneous Normal aortic root dimension. E/E' average = 8.85. IVC Increased diameter, but still has inspiratory variation suggesting upper normal or mildly elevated RA filling pressure (i.e. CVP) .  M-mode / 2D Measurements & Calculations:   LVIDd:4.8 cm(3.7 - 5.6 cm)        Diastolic ULAMUV:030 ml  RZXFS:0.5 cm(2.2 - 4.0 cm)        Systolic TDPSLZ:49.8 ml  RERD:1.6 cm(0.6 - 1.1 cm)         Aortic Root:3.3 cm(2.0 - 3.7 cm)  LVPWd:0.9 cm(0.6 - 1.1 cm)        LA Dimension: 3 cm(1.9 - 4.0 cm)  Fractional Shortenin.08 %     LA volume/Index: 18.15 ml /8m^2  Calculated LVEF (%): 61.35 %      LVOT:1.9 cm                                    RVDd:3.4 cm   Mitral:                                 Aortic   Valve Area (P1/2-Time): 3.73 cm^2       Peak Velocity: EXAMINATION: CT OF THE HEAD WITHOUT CONTRAST  12/14/2020 2:30 pm TECHNIQUE: CT of the head was performed without the administration of intravenous contrast. Dose modulation, iterative reconstruction, and/or weight based adjustment of the mA/kV was utilized to reduce the radiation dose to as low as reasonably achievable. COMPARISON: None. HISTORY: ORDERING SYSTEM PROVIDED HISTORY: s/p tumor resection TECHNOLOGIST PROVIDED HISTORY: Please complete before leaving pacu and transfer patient with blanket over face. Patient must be kept in dark room 48 hrs post op. Please complete CTH with blanket over face. s/p tumor resection Reason for Exam: post op FINDINGS: BRAIN/VENTRICLES: Extensive bifrontal pneumocephaly  and also  along the left temporal parietal region. Postsurgical changes in the left temporoparietal lobe with mild residual extra-axial hemorrhage and intra-axial edema. Mild midline shift from left to right. Left temporoparietal craniotomy defects. Chronic right ethmoid and sphenoid sinus disease. [ Status post left craniotomy with postsurgical changes in the left temporoparietal lobe with residual edema, pneumocephaly  and mild extra-axial hemorrhage.      Ct Head Wo Contrast    Result Date: 12/9/2020 EXAMINATION: CT OF THE HEAD WITHOUT CONTRAST; CTA OF THE HEAD AND NECK WITH CONTRAST 12/9/2020 1:46 pm; 12/9/2020 1:59 pm: TECHNIQUE: CT of the head was performed without the administration of intravenous contrast. Dose modulation, iterative reconstruction, and/or weight based adjustment of the mA/kV was utilized to reduce the radiation dose to as low as reasonably achievable.; CTA of the head and neck was performed with the administration of intravenous contrast. Multiplanar reformatted images are provided for review. MIP images are provided for review. Stenosis of the internal carotid arteries measured using NASCET criteria. Dose modulation, iterative reconstruction, and/or weight based adjustment of the mA/kV was utilized to reduce the radiation dose to as low as reasonably achievable. Noncontrast CT of the head with reconstructed 2-D images are also provided for review. COMPARISON: None. HISTORY: ORDERING SYSTEM PROVIDED HISTORY: ams stroke TECHNOLOGIST PROVIDED HISTORY: [facility] stroke Reason for Exam: ams Acuity: Acute Type of Exam: Initial; ORDERING SYSTEM PROVIDED HISTORY: stroke TECHNOLOGIST PROVIDED HISTORY: stroke Reason for Exam: ams Acuity: Acute Type of Exam: Initial FINDINGS: CT HEAD: BRAIN/VENTRICLES:  No acute intracranial hemorrhage or extraaxial fluid collection. Grey-white differentiation is maintained. No hydrocephalus is evident. There is a 3 cm mass within the posterior left temporal lobe with surrounding vasogenic edema. Slight midline shift from left to right is identified. ORBITS: The visualized portion of the orbits demonstrate no acute abnormality. SINUSES:  There is opacification of the right posterior ethmoid air cells and right sphenoid sinus. The mastoid air cells are well pneumatized SOFT TISSUES/SKULL: No acute abnormality of the visualized skull or soft tissues. CTA NECK: AORTIC ARCH/ARCH VESSELS: No dissection or arterial injury.   No significant stenosis of the brachiocephalic or subclavian arteries. CAROTID ARTERIES: No dissection, arterial injury, or hemodynamically significant stenosis by NASCET criteria. VERTEBRAL ARTERIES: No dissection, arterial injury, or significant stenosis. SOFT TISSUES: The lung apices are clear. No cervical or superior mediastinal lymphadenopathy. The larynx and pharynx are unremarkable. No acute abnormality of the salivary and thyroid glands. BONES: No acute osseous abnormality. CTA HEAD: ANTERIOR CIRCULATION: No significant stenosis of the intracranial internal carotid, anterior cerebral, or middle cerebral arteries. No aneurysm. POSTERIOR CIRCULATION: No significant stenosis of the vertebral, basilar, or posterior cerebral arteries. No aneurysm. OTHER: No dural venous sinus thrombosis on this non-dedicated study     1. Noncontrast CT of the head demonstrates presence of a 3 cm left posterior temporal lobe mass with surrounding vasogenic edema. Minimal midline shift from left to right is noted. 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 MRI of the brain with contrast is recommended. 2. Unremarkable CTA of the head and neck. The findings were sent to the Radiology Results [address] at 2:15 pm on 12/9/2020to be communicated to a licensed caregiver.      Ct Cervical Spine Wo Contrast    Result Date: 12/9/2020 EXAMINATION: CT OF THE CERVICAL SPINE WITHOUT CONTRAST 12/9/2020 4:12 pm TECHNIQUE: CT of the cervical spine was performed without the administration of intravenous contrast. Multiplanar reformatted images are provided for review. Dose modulation, iterative reconstruction, and/or weight based adjustment of the mA/kV was utilized to reduce the radiation dose to as low as reasonably achievable. COMPARISON: None. HISTORY: ORDERING SYSTEM PROVIDED HISTORY: weakness, possible fall TECHNOLOGIST PROVIDED HISTORY: weakness, possible fall FINDINGS: BONES/ALIGNMENT: There is no acute fracture or traumatic malalignment. DEGENERATIVE CHANGES: Multilevel degenerative changes SOFT TISSUES: There is no prevertebral soft tissue swelling. No acute abnormality of the cervical spine. Xr Chest Portable    Result Date: 12/9/2020  EXAMINATION: ONE XRAY VIEW OF THE CHEST 12/9/2020 3:47 pm COMPARISON: None. HISTORY: ORDERING SYSTEM PROVIDED HISTORY: Altered mental status TECHNOLOGIST PROVIDED HISTORY: Altered mental status Reason for Exam: supine Acuity: Unknown Type of Exam: Unknown FINDINGS: Cardiomediastinal silhouette is upper limits of normal in size. No pulmonary consolidation, pleural effusion, or pneumothorax. No acute osseous abnormality. No acute cardiopulmonary abnormality.      Vl Dup Lower Extremity Venous Bilateral    Result Date: 12/17/2020 Left Impression:  The common femoral, femoral,         The common femoral, femoral,  popliteal and tibial veins           popliteal and tibial veins  demonstrate normal compressibility   demonstrate normal compressibility  and augmentation. and augmentation. Normal compressibility of the great  Normal compressibility of the great  saphenous vein. saphenous vein. Normal compressibility of the small  Normal compressibility of the small  saphenous vein. saphenous vein. There is a branch off of the greater                                       saphenous vein in the calf that is                                       non compressible. Velocities are measured in cm/s ; Diameters are measured in cm Right Lower Extremities DVT Study Measurements Right 2D Measurements +------------------------------------+----------+---------------+----------+ ! Location                            ! Visualized! Compressibility! Thrombosis! +------------------------------------+----------+---------------+----------+ ! Common Femoral                      !Yes       ! Yes            ! None      ! +------------------------------------+----------+---------------+----------+ ! Prox Femoral                        !Yes       ! Yes            ! None      ! +------------------------------------+----------+---------------+----------+ ! Mid Femoral                         !Yes       ! Yes            ! None      ! +------------------------------------+----------+---------------+----------+ ! Dist Femoral                        !Yes       ! Yes            ! None      ! +------------------------------------+----------+---------------+----------+ ! Deep Femoral                        !Yes       ! Yes            ! None      ! +------------------------------------+----------+---------------+----------+ ! Popliteal                           !Yes       ! Yes !None      ! +------------------------------------+----------+---------------+----------+ ! Sapheno Femoral Junction            ! Yes       ! Yes            ! None      ! +------------------------------------+----------+---------------+----------+ ! PTV                                 ! Yes       ! Yes            ! None      ! +------------------------------------+----------+---------------+----------+ ! Peroneal                            !Partial   !Yes            ! None      ! +------------------------------------+----------+---------------+----------+ ! Gastroc                             ! Yes       ! Yes            ! None      ! +------------------------------------+----------+---------------+----------+ ! GSV Thigh                           ! Yes       ! Yes            ! None      ! +------------------------------------+----------+---------------+----------+ ! GSV Knee                            ! Yes       ! Yes            ! None      ! +------------------------------------+----------+---------------+----------+ ! GSV Ankle                           ! Yes       ! Yes            ! None      ! +------------------------------------+----------+---------------+----------+ ! SSV                                 ! Yes       ! Yes            ! None      ! +------------------------------------+----------+---------------+----------+ Right Doppler Measurements +---------------------------+------+------+--------------------------------+ ! Location                   ! Signal!Reflux! Reflux (msec)                   ! +---------------------------+------+------+--------------------------------+ ! Common Femoral             !Phasic!      !                                ! +---------------------------+------+------+--------------------------------+ ! Prox Femoral               !Phasic!      !                                ! +---------------------------+------+------+--------------------------------+ ! Popliteal                  !Phasic!      ! ! +---------------------------+------+------+--------------------------------+ Left Lower Extremities DVT Study Measurements Left 2D Measurements +------------------------------------+----------+---------------+----------+ ! Location                            ! Visualized! Compressibility! Thrombosis! +------------------------------------+----------+---------------+----------+ ! Common Femoral                      !Yes       ! Yes            ! None      ! +------------------------------------+----------+---------------+----------+ ! Prox Femoral                        !Yes       ! Yes            ! None      ! +------------------------------------+----------+---------------+----------+ ! Mid Femoral                         !Yes       ! Yes            ! None      ! +------------------------------------+----------+---------------+----------+ ! Dist Femoral                        !Yes       ! Yes            ! None      ! +------------------------------------+----------+---------------+----------+ ! Deep Femoral                        !Yes       ! Yes            ! None      ! +------------------------------------+----------+---------------+----------+ ! Popliteal                           !Yes       ! Yes            ! None      ! +------------------------------------+----------+---------------+----------+ ! Sapheno Femoral Junction            ! Yes       ! Yes            ! None      ! +------------------------------------+----------+---------------+----------+ ! PTV                                 ! Partial   !Yes            ! None      ! +------------------------------------+----------+---------------+----------+ ! Peroneal                            !Partial   !Yes            ! None      ! +------------------------------------+----------+---------------+----------+ ! Gastroc                             ! Yes       ! Yes            ! None      ! +------------------------------------+----------+---------------+----------+ ! GSV Thigh !Yes       !Yes            ! None      ! +------------------------------------+----------+---------------+----------+ ! GSV Knee                            ! Yes       ! Yes            ! None      ! +------------------------------------+----------+---------------+----------+ ! GSV Ankle                           ! Yes       ! Yes            ! None      ! +------------------------------------+----------+---------------+----------+ ! SSV                                 ! Partial   !Yes            ! None      ! +------------------------------------+----------+---------------+----------+ Left Doppler Measurements +---------------------------+------+------+--------------------------------+ ! Location                   ! Signal!Reflux! Reflux (msec)                   ! +---------------------------+------+------+--------------------------------+ ! Common Femoral             !Phasic!      !                                ! +---------------------------+------+------+--------------------------------+ ! Prox Femoral               !Phasic!      !                                ! +---------------------------+------+------+--------------------------------+ ! Popliteal                  !Phasic!      !                                ! +---------------------------+------+------+--------------------------------+    Ct Brain Perfusion    Result Date: 12/9/2020 EXAMINATION: CT OF THE HEAD WITH CONTRAST WITH PERFUSION 12/9/2020 3:19 pm: TECHNIQUE: CT of the head/brain was performed with the administration of intravenous contrast. Multiplanar reformatted images are provided for review. MIP images are provided for review. Dose modulation, iterative reconstruction, and/or weight based adjustment of the mA/kV was utilized to reduce the radiation dose to as low as reasonably achievable. COMPARISON: CTA head and CT head from today HISTORY: ORDERING SYSTEM PROVIDED HISTORY: stroke TECHNOLOGIST PROVIDED HISTORY: stroke FINDINGS: CT PERFUSION: There is symmetric perfusion to the brain without a perfusion mismatch. There is ring-like matching increased cerebral blood volume, cerebral blood flow, and mean transit time in the left temporal lobe mass. 1. No perfusion mismatch. 2. Left temporal lobe mass demonstrates matching increased cerebral blood flow cerebral blood volume and mean transit time.      Cta Head Neck W Contrast    Result Date: 12/9/2020 EXAMINATION: CT OF THE HEAD WITHOUT CONTRAST; CTA OF THE HEAD AND NECK WITH CONTRAST 12/9/2020 1:46 pm; 12/9/2020 1:59 pm: TECHNIQUE: CT of the head was performed without the administration of intravenous contrast. Dose modulation, iterative reconstruction, and/or weight based adjustment of the mA/kV was utilized to reduce the radiation dose to as low as reasonably achievable.; CTA of the head and neck was performed with the administration of intravenous contrast. Multiplanar reformatted images are provided for review. MIP images are provided for review. Stenosis of the internal carotid arteries measured using NASCET criteria. Dose modulation, iterative reconstruction, and/or weight based adjustment of the mA/kV was utilized to reduce the radiation dose to as low as reasonably achievable. Noncontrast CT of the head with reconstructed 2-D images are also provided for review. COMPARISON: None. HISTORY: ORDERING SYSTEM PROVIDED HISTORY: ams stroke TECHNOLOGIST PROVIDED HISTORY: [facility] stroke Reason for Exam: ams Acuity: Acute Type of Exam: Initial; ORDERING SYSTEM PROVIDED HISTORY: stroke TECHNOLOGIST PROVIDED HISTORY: stroke Reason for Exam: ams Acuity: Acute Type of Exam: Initial FINDINGS: CT HEAD: BRAIN/VENTRICLES:  No acute intracranial hemorrhage or extraaxial fluid collection. Grey-white differentiation is maintained. No hydrocephalus is evident. There is a 3 cm mass within the posterior left temporal lobe with surrounding vasogenic edema. Slight midline shift from left to right is identified. ORBITS: The visualized portion of the orbits demonstrate no acute abnormality. SINUSES:  There is opacification of the right posterior ethmoid air cells and right sphenoid sinus. The mastoid air cells are well pneumatized SOFT TISSUES/SKULL: No acute abnormality of the visualized skull or soft tissues. CTA NECK: AORTIC ARCH/ARCH VESSELS: No dissection or arterial injury.   No significant stenosis of the brachiocephalic or subclavian arteries. CAROTID ARTERIES: No dissection, arterial injury, or hemodynamically significant stenosis by NASCET criteria. VERTEBRAL ARTERIES: No dissection, arterial injury, or significant stenosis. SOFT TISSUES: The lung apices are clear. No cervical or superior mediastinal lymphadenopathy. The larynx and pharynx are unremarkable. No acute abnormality of the salivary and thyroid glands. BONES: No acute osseous abnormality. CTA HEAD: ANTERIOR CIRCULATION: No significant stenosis of the intracranial internal carotid, anterior cerebral, or middle cerebral arteries. No aneurysm. POSTERIOR CIRCULATION: No significant stenosis of the vertebral, basilar, or posterior cerebral arteries. No aneurysm. OTHER: No dural venous sinus thrombosis on this non-dedicated study     1. Noncontrast CT of the head demonstrates presence of a 3 cm left posterior temporal lobe mass with surrounding vasogenic edema. Minimal midline shift from left to right is noted. 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 MRI of the brain with contrast is recommended. 2. Unremarkable CTA of the head and neck. The findings were sent to the Radiology Results [id number] at 2:15 pm on 12/9/2020to be communicated to a licensed caregiver.      Ct Chest Abdomen Pelvis Wo Contrast    Result Date: 12/9/2020 EXAMINATION: CT OF THE CHEST, ABDOMEN, AND PELVIS WITHOUT CONTRAST 12/9/2020 4:37 pm TECHNIQUE: CT of the chest, abdomen and pelvis was performed without the administration of intravenous contrast. Multiplanar reformatted images are provided for review. Dose modulation, iterative reconstruction, and/or weight based adjustment of the mA/kV was utilized to reduce the radiation dose to as low as reasonably achievable. Residual intravenous contrast noted in the renal collecting systems. COMPARISON: Chest x-ray December 9, 2020 HISTORY: ORDERING SYSTEM PROVIDED HISTORY: abd pain TECHNOLOGIST PROVIDED HISTORY: abd pain FINDINGS: Chest: Mediastinum: Calcific coronary artery disease. Heart size is normal. There is no pericardial effusion. Without intravenous contrast, evaluation for adenopathy is of lower sensitivity. Within the limitations of a noncontrast exam, there is no evidence of hilar or mediastinal lymphadenopathy. Lungs/pleura: Clear Soft Tissues/Bones: Normal Abdomen/Pelvis: Organs: Fat containing umbilical hernia contains knuckle of small bowel without evidence of obstruction. The liver, spleen, pancreas, and adrenals appear normal.  Gallbladder normal. Kidneys appear normal.  Residual intravenous contrast within the collecting systems. Bladder is filled with hyperdense contrast.  Prostate is enlarged and nodular. Fat containing left inguinal hernia. GI/Bowel: The stomach,small bowel, and colon appear normal. Appendix is not well visualized. Pelvis: Normal Peritoneum/Retroperitoneum: The abdominal aorta and iliac arteries are normal in caliber. There is no pathologic adenopathy. Retroaortic left renal vein. Bones/Soft Tissues: Multilevel vacuum disc phenomena. 1 cm anterior subluxation L5 on S1 with bilateral pars defects. No acute disease. Incidental findings as above.      Mri Brain W Wo Contrast    Result Date: 12/15/2020 EXAMINATION: MRI OF THE BRAIN WITHOUT AND WITH CONTRAST  12/15/2020 4:06 pm TECHNIQUE: Multiplanar multisequence MRI of the head/brain was performed without and with the administration of intravenous contrast. COMPARISON: MRI brain 12/09/2020. HISTORY: ORDERING SYSTEM PROVIDED HISTORY: s/p tumor resection TECHNOLOGIST PROVIDED HISTORY: Patient needs to be in dark for 48 hours postop. Please obtain with blanket over face. s/p tumor resection Reason for Exam: s/p tumor resection FINDINGS: INTRACRANIAL STRUCTURES/VENTRICLES: No evidence of acute stroke. There is abnormal restricted diffusion identified within the surgical cavity likely related to blood products. There is T2 prolongation and diffusion hyperintensity identified extending along the splenium corpus callosum likely due to the known mass. The postsurgical changes status post resection of/debulking of the left temporal lobe mass. There are blood products and proteinaceous debris identified along the resection cavity. Blood products extend into the occipital horn left lateral ventricle with subependymal hemosiderin. There is surrounding T2 prolongation identified within left temporal lobe extending along the splenium of the corpus callosum. This does not appear to cross the midline. There is abnormal T2 prolongation identified involving the posterior limb internal capsule and the posterior subinsular region. Intrinsic T1 shortening identified along the resection cavity appears to match the areas of postcontrast enhancement, however there appears to be subtle areas of subependymal enhancement left peritrigonal region left lateral ventricle just superior to the body of the occipital horn. No definite nodular enhancement identified within resection cavity. There is left-to-right midline shift measuring 7 mm. Sulcal effacement identified left temporal lobe.   There is abnormal signal identified overlying both frontal convexities due to the pneumocephalus related to recent surgery. There is a left frontal parietal and temporal extra-axial collection corresponding to the recent left-sided craniotomy. This likely corresponds to the postoperative hematoma. ORBITS: The visualized portion of the orbits demonstrate no acute abnormality. SINUSES: Mild paranasal sinus mucosal thickening. BONES/SOFT TISSUES: Postsurgical changes left temporal region. Expected postoperative findings status left temporal craniotomy with T2 prolongation identified within left temporal lobe extending along splenium corpus callosum worrisome for possibility of underlying infiltrative tumor. Additionally there is evidence of questionable subependymal enhancement which could be related to recent surgery/postoperative proteinaceous debris/blood products versus early subependymal spread of tumor. There is evidence of intrinsic T1 shortening identified on operative bed consistent with postop blood and proteinaceous debris postoperative changes without convincing evidence of enhancing nodularity within the resection cavity. Postoperative extra-axial fluid collection identified on left. 7 mm of left-to-right midline shift. Continued surveillance is recommended given the above findings.      Mri Brain W Wo Contrast    Result Date: 12/9/2020 EXAMINATION: MRI OF THE BRAIN WITHOUT AND WITH CONTRAST  12/9/2020 6:23 pm TECHNIQUE: Multiplanar multisequence MRI of the head/brain was performed without and with the administration of intravenous contrast. COMPARISON: CT brain from today HISTORY: ORDERING SYSTEM PROVIDED HISTORY: stroke/seizure TECHNOLOGIST PROVIDED HISTORY: stroke/seizure Reason for Exam: stroke/seizure, eval for tumor FINDINGS: INTRACRANIAL STRUCTURES/VENTRICLES:  There is no acute infarct. There is a intra-axial left temporal mass with peripheral enhancement measuring 32 x 23 mm in greatest transverse dimensions. There is local edema and 3-4 mm midline shift. There is loss of signal on T2 star. There appears to be increased and decreased diffusion. The ventricles and sulci are normal in size and configuration. The sellar/suprasellar regions appear unremarkable. The normal signal voids within the major intracranial vessels appear maintained. No abnormal focus of enhancement is seen within the brain. ORBITS: The visualized portion of the orbits demonstrate no acute abnormality. SINUSES: The visualized paranasal sinuses and mastoid air cells are well aerated. BONES/SOFT TISSUES: The bone marrow signal intensity appears normal. The soft tissues demonstrate no acute abnormality. Ring-enhancing left temporal lobe mass consistent with abscess versus primary or secondary neoplasm. Local edema and slight midline shift. Mixed cytotoxic and vasogenic edema. Microscopic hemorrhage.      Ct Lumbar Spine Trauma Reconstruction    Result Date: 12/9/2020 EXAMINATION: CT OF THE THORACIC SPINE WITHOUT CONTRAST; CT OF THE LUMBAR SPINE WITHOUT CONTRAST  12/9/2020 TECHNIQUE: CT of the thoracic spine was performed without the administration of intravenous contrast. Multiplanar reformatted images are provided for review. Dose modulation, iterative reconstruction, and/or weight based adjustment of the mA/kV was utilized to reduce the radiation dose to as low as reasonably achievable.; CT of the lumbar spine was performed without the administration of intravenous contrast. Multiplanar reformatted images are provided for review. Dose modulation, iterative reconstruction, and/or weight based adjustment of the mA/kV was utilized to reduce the radiation dose to as low as reasonably achievable. COMPARISON: None HISTORY: ORDERING SYSTEM PROVIDED HISTORY: BLE weakness TECHNOLOGIST PROVIDED HISTORY: BLE weakness; ORDERING SYSTEM PROVIDED HISTORY: LOWER EXTREMITY WEAKNESS TECHNOLOGIST PROVIDED HISTORY: BLE weakness FINDINGS: CT thoracic spine: BONES/ALIGNMENT: There is normal alignment of the spine. The vertebral body heights are maintained. No osseous destructive lesion is seen. DEGENERATIVE CHANGES: Diffuse mild degenerative and degenerative disc changes are noted. No gross spinal canal stenosis or bony neural foraminal narrowing of the thoracic spine. SOFT TISSUES: No paraspinal mass is seen. CT lumbar spine: BONES/ALIGNMENT: A 6 mm grade 1 anterolisthesis of L5 upon S1 is noted with bilateral nonacute L5 spondylolysis. Mild grade 1 retrolisthesis L4 upon L5 is present. No acute fracture is evident. DEGENERATIVE CHANGES: Mild-to-moderate degenerative and degenerative disc changes are present most significant at L2-L3, L3-L4 and L5-S1 with vacuum phenomenon. Multilevel disc protrusions are noted with disc protrusions noted L2-L3 through L5-S1. Exiting nerve roots at L2-L3 and L3-L4 elevated but not impinged.   Exiting nerve roots are elevated and appear borderline impinged at L5-S1. No gross bony canal stenosis is evident. SOFT TISSUES/RETROPERITONEUM: No paraspinal mass is seen. CT thoracic spine: Degenerative and degenerative disc disease. No acute fracture or traumatic malalignment. CT lumbar spine: No acute fracture. Patient has an anterolisthesis L5 upon S1 and bilateral chronic spondylosis. Multilevel degenerative and degenerative disc changes are noted as above. Multilevel disc bulges. Exiting L5-S1 nerve roots are elevated and borderline impinged.        Assessment & Differential Dx:      Primary Problem  <principal problem not specified>    Active Hospital Problems    Diagnosis Date Noted    Thrombocytopenia ([facility] [address] [D69.6]     GBM (glioblastoma multiforme) (HCC) [C71.9]     Acute encephalopathy [G93.40]     Cerebral edema (HCC) [G93.6]     Brain mass [G93.89] 12/09/2020       Case of 76-year-old male, presented for AMS, less responsive, brought to the hospital with NIH: 20, aphasia, left posterior temporal lobe mass with vasogenic edema, patient underwent resection of underlying mass, consistent with GBM    Impression:  -Left posterior temporal lobe mass with vasogenic edema status post resection consistent with GBM stage IV      Plan:     -Seizure prophylaxis with Depakote 500 twice daily  -Patient declining hospice,  -Palliative care on board:  -PM&R: Patient will benefit from acute rehab  -Follow-up with neurology in 4 to 6 weeks of discharge  -Follow-up with neurosurgery in 1 week with Dr. Lisbet Narvaez  -Discharge awaiting pre-CERT  -Expected discharge tomorrow  -CODE STATUS: DNR Sadia Huston MD, 12/24/2020 9:05 AM

## 2020-12-24 NOTE — PROGRESS NOTES
Speech Language Pathology  Speech Language Pathology  [address] [facility]    Speech Language Treatment Note    Date: 12/24/2020  Patients Name: Candcie Long  MRN: [id number]  Diagnosis:   Patient Active Problem List   Diagnosis Code    Brain mass G93.89    Acute encephalopathy G93.40    Cerebral edema (HCC) G93.6    Thrombocytopenia (HCC) D69.6    GBM (glioblastoma multiforme) ([facility] [address] C71.9       Pain: 0/10    Speech and Language Treatment  Treatment time: 8:45-8:57    Subjective: [x] Alert [x] Cooperative     [] Confused     [] Agitated    [] Lethargic    Objective/Assessment:  Auditory Comprehension:  Basic Yes/No: 100% independently     Verbal Expression:   What Questions: 1/5 increased to 5/5 with max verbal, visual, and part-word cues. Forced choice cue given x1/5. Where Questions: 0/2 increased to 2/2 with forced choice cues    Picture ID: 0/3 increased to 3/3 with max verbal, visual, and part-word cues     Orientation information: Pt independently stated today as \"Thursday, the day before Tung. \" Pt recalled several tasks from previous ST sessions. Other: Pt pleasant and cooperative throughout session. Continues to report that \"he knows the words they just won't come out. \" ST reviewed previous education re: word finding deficits with good return. Session d/c early as pt's breakfast arrived. Pt positioned upright to eat breakfast.     Plan:  [x] Continue ST services    [] Discharge from ST:      Discharge recommendations: Further therapy recommended at discharge.     Treatment completed by: Victor Hugo Coto M.S. CF-SLP

## 2020-12-24 NOTE — PLAN OF CARE
Problem: Falls - Risk of:  Goal: Will remain free from falls  Description: Will remain free from falls  12/24/2020 1609 by Nestor Camejo RN  Outcome: Ongoing  12/24/2020 0554 by Santiago Bob RN  Outcome: Met This Shift  Goal: Absence of physical injury  Description: Absence of physical injury  12/24/2020 1609 by Nestor Camejo RN  Outcome: Ongoing  12/24/2020 0554 by Santiago Bob RN  Outcome: Met This Shift   Patient remained free from injury. Patient verbalized understanding of need for the safety precautions. Demonstrates proper use of assistive devices. Bed remains in the lowest position. Call light remains within reach. Falling Star Program in use.      Problem: Skin Integrity:  Goal: Will show no infection signs and symptoms  Description: Will show no infection signs and symptoms  Outcome: Ongoing  Goal: Absence of new skin breakdown  Description: Absence of new skin breakdown  Outcome: Ongoing

## 2020-12-24 NOTE — PLAN OF CARE
Problem: Falls - Risk of:  Goal: Will remain free from falls  Description: Will remain free from falls  12/24/2020 0554 by Blayne Santana RN  Outcome: Met This Shift  12/23/2020 1827 by Mimi Flaherty RN  Outcome: Ongoing  Goal: Absence of physical injury  Description: Absence of physical injury  12/24/2020 0554 by Blayne Santana RN  Outcome: Met This Shift  12/23/2020 1827 by Mimi Flaherty RN  Outcome: Ongoing     Pt assessed as a fall risk this shift. Remains free from falls and accidental injury at this time. Fall precautions in place, including falling star sign. Floor free from obstacles, and bed is locked and in lowest position. Adequate lighting provided. Pt encouraged to call before getting Out Of Bed for any need. Will continue to monitor needs during hourly rounding, and reinforce education on use of call light.

## 2020-12-24 NOTE — PROGRESS NOTES
Occupational Therapy  Facility/Department: [facility] NEURO  Daily Treatment Note  NAME: Anjelica Borjas  : 1945  MRN: [id number]    Date of Service: 2020    Discharge Recommendations:Further therapy recommended at discharge. The patient should be able to tolerate at least three hours of therapy per day over 5 days or 15 hours over 7 days. Pt unsafe to return to residence at increased risk for falls. Assessment   Performance deficits / Impairments: Decreased functional mobility ; Decreased safe awareness;Decreased ADL status; Decreased endurance;Decreased high-level IADLs;Decreased cognition;Decreased strength;Decreased balance  Prognosis: Good  OT Education: OT Role;Transfer Training;ADL Adaptive Strategies;Precautions  Patient Education: purpose of OT; proper hand and foot placement; walker management; 4 figure dressing technique; safety precautions  Barriers to Learning: pt demo F carry over with increased verbal/tactile assist  REQUIRES OT FOLLOW UP: Yes  Activity Tolerance  Activity Tolerance: Patient Tolerated treatment well;Patient limited by fatigue  Safety Devices  Safety Devices in place: Yes  Type of devices: Gait belt;Patient at risk for falls; Left in chair;Chair alarm in place;Call light within reach         Patient Diagnosis(es): The primary encounter diagnosis was Brain mass. A diagnosis of [place] coma scale total score 3-8, in the field (EMT or ambulance) [facility]) was also pertinent to this visit. has no past medical history on file. has a past surgical history that includes craniotomy (Left, 2020) and craniotomy (Left, 2020). Restrictions  Restrictions/Precautions  Restrictions/Precautions: General Precautions, Surgical Protocols, Fall Risk, Up as Tolerated  Required Braces or Orthoses?: No  Position Activity Restriction  Other position/activity restrictions:  Up with assist  Subjective   General  Chart Reviewed: Yes  Patient assessed for rehabilitation services?: Yes Family / Caregiver Present: No  Diagnosis: stroke like symptoms  General Comment  Comments: RN and pt agreeable to therapy this day  Vital Signs  Patient Currently in Pain: Denies   Orientation  Orientation  Overall Orientation Status: Within Functional Limits  Objective    ADL  Grooming: Contact guard assistance;Setup;Verbal cueing; Increased time to complete(face washing and face shaving completed standing at sink utilizing 0-2 hand support for balance maintaince)  UE Dressing: Minimal assistance;Setup;Verbal cueing; Increased time to complete(to doff/don gown req asssist threading orientation)  LE Dressing: Maximum assistance;Setup;Verbal cueing; Increased time to complete(to doff/don socks pt demo P hip flexion and 4 figure dressing technique carry over)  Toileting: Setup; Increased time to complete;Minimal assistance(seated on toilet for personal hygiene req CGA for balance maintaince utilizing grab bar seated; pt req assist for thoroughness)  Additional Comments: Pt supine in bed at start of session pleasant and agreeable to therapy this day.  Throughout session pt req cues for initiation and cues to keep eyes open for balance maintaince and safety  Balance  Sitting Balance: Stand by assistance(seated at EOB dynamic/static)  Standing Balance: Contact guard assistance  Standing Balance  Time: Pt tolerated approx 10-12 min dynamic standing  Activity: during ADLs  Comment: utilizing sink for support  Functional Mobility  Functional - Mobility Device: Rolling Walker  Activity: To/from bathroom  Assist Level: Contact guard assistance  Functional Mobility Comments: EOB>sink>toilet>chair req verbal cues on proper hand placement  Toilet Transfers  Toilet - Technique: Stand pivot  Equipment Used: Standard toilet(with grab bars)  Toilet Transfer: Minimal assistance  Toilet Transfers Comments: pt req increased assist from low toilet  Bed mobility  Supine to Sit: Minimal assistance  Scooting: Contact guard assistance Transfers  Stand Step Transfers: Contact guard assistance  Stand Pivot Transfers: Minimal assistance  Sit to stand: Minimal assistance  Stand to sit: Minimal assistance  Transfer Comments: utilizing RW  Cognition  Overall Cognitive Status: Exceptions  Arousal/Alertness: Appropriate responses to stimuli  Following Commands: Follows multistep commands with repitition; Follows multistep commands with increased time  Attention Span: Attends with cues to redirect  Memory: Decreased short term memory;Decreased recall of precautions  Safety Judgement: Decreased awareness of need for assistance;Decreased awareness of need for safety  Problem Solving: Decreased awareness of errors;Assistance required to identify errors made;Assistance required to correct errors made  Insights: Decreased awareness of deficits  Initiation: Requires cues for some  Sequencing: Requires cues for some     Pt increasingly motivated for activity. ADL tasks of dressing, grooming and toileting completed see above for LOF. 0 LOB noted however pt increasingly unsteady with slow cautious gait. At session end pt seated in chair with BLE elevated, call light in reach and chair alarm on.    Plan   Plan  Times per week: 3-5x/wk   Cont POC    Goals  Short term goals  Time Frame for Short term goals: pt will, by discharge  Short term goal 1: maintain attention to task for ~6 minutes with 2-3 verbal cues for redirection  Short term goal 2: complete LB ADLs with mod A, set up and AE, as needed  Short term goal 3: complete UB ADLs and grooming tasks with SBA and set up  Short term goal 4: increase activity tolerance to 20+ minutes in order to participate in daily tasks  Short term goal 5: dem SBA during functional transfers/functional mobility with LRD       Therapy Time   Individual Concurrent Group Co-treatment   Time In 0925         Time Out 1006         Minutes 41         Timed Code Treatment Minutes: [address] CrowellA[address]L

## 2020-12-25 LAB
ANION GAP SERPL CALCULATED.3IONS-SCNC: 7 MMOL/L (ref 9–17)
BUN BLDV-MCNC: 21 MG/DL (ref 8–23)
BUN/CREAT BLD: ABNORMAL (ref 9–20)
CALCIUM IONIZED: 1.05 MMOL/L (ref 1.13–1.33)
CALCIUM SERPL-MCNC: 8.1 MG/DL (ref 8.6–10.4)
CHLORIDE BLD-SCNC: 105 MMOL/L (ref 98–107)
CO2: 23 MMOL/L (ref 20–31)
CREAT SERPL-MCNC: 0.73 MG/DL (ref 0.7–1.2)
GFR AFRICAN AMERICAN: >60 ML/MIN
GFR NON-AFRICAN AMERICAN: >60 ML/MIN
GFR SERPL CREATININE-BSD FRML MDRD: ABNORMAL ML/MIN/{1.73_M2}
GFR SERPL CREATININE-BSD FRML MDRD: ABNORMAL ML/MIN/{1.73_M2}
GLUCOSE BLD-MCNC: 93 MG/DL (ref 70–99)
HCT VFR BLD CALC: 46.1 % (ref 40.7–50.3)
HEMOGLOBIN: 14.9 G/DL (ref 13–17)
MCH RBC QN AUTO: 31.3 PG (ref 25.2–33.5)
MCHC RBC AUTO-ENTMCNC: 32.3 G/DL (ref 28.4–34.8)
MCV RBC AUTO: 96.8 FL (ref 82.6–102.9)
NRBC AUTOMATED: 0 PER 100 WBC
PDW BLD-RTO: 13.3 % (ref 11.8–14.4)
PHOSPHORUS: 2.7 MG/DL (ref 2.5–4.5)
PLATELET # BLD: 140 K/UL (ref 138–453)
PMV BLD AUTO: 9.4 FL (ref 8.1–13.5)
POTASSIUM SERPL-SCNC: 4.1 MMOL/L (ref 3.7–5.3)
RBC # BLD: 4.76 M/UL (ref 4.21–5.77)
SODIUM BLD-SCNC: 135 MMOL/L (ref 135–144)
WBC # BLD: 6.2 K/UL (ref 3.5–11.3)

## 2020-12-25 PROCEDURE — 36415 COLL VENOUS BLD VENIPUNCTURE: CPT

## 2020-12-25 PROCEDURE — 6370000000 HC RX 637 (ALT 250 FOR IP): Performed by: PSYCHIATRY & NEUROLOGY

## 2020-12-25 PROCEDURE — 2580000003 HC RX 258: Performed by: PHYSICIAN ASSISTANT

## 2020-12-25 PROCEDURE — 2500000003 HC RX 250 WO HCPCS: Performed by: PHYSICIAN ASSISTANT

## 2020-12-25 PROCEDURE — 6370000000 HC RX 637 (ALT 250 FOR IP): Performed by: PHYSICIAN ASSISTANT

## 2020-12-25 PROCEDURE — 82330 ASSAY OF CALCIUM: CPT

## 2020-12-25 PROCEDURE — 6360000002 HC RX W HCPCS: Performed by: STUDENT IN AN ORGANIZED HEALTH CARE EDUCATION/TRAINING PROGRAM

## 2020-12-25 PROCEDURE — 80048 BASIC METABOLIC PNL TOTAL CA: CPT

## 2020-12-25 PROCEDURE — 85027 COMPLETE CBC AUTOMATED: CPT

## 2020-12-25 PROCEDURE — 6370000000 HC RX 637 (ALT 250 FOR IP): Performed by: NURSE PRACTITIONER

## 2020-12-25 PROCEDURE — 99232 SBSQ HOSP IP/OBS MODERATE 35: CPT | Performed by: STUDENT IN AN ORGANIZED HEALTH CARE EDUCATION/TRAINING PROGRAM

## 2020-12-25 PROCEDURE — 84100 ASSAY OF PHOSPHORUS: CPT

## 2020-12-25 PROCEDURE — 2060000000 HC ICU INTERMEDIATE R&B

## 2020-12-25 RX ADMIN — CARVEDILOL 3.12 MG: 3.12 TABLET, FILM COATED ORAL at 08:38

## 2020-12-25 RX ADMIN — VENLAFAXINE 37.5 MG: 37.5 TABLET ORAL at 08:38

## 2020-12-25 RX ADMIN — ENOXAPARIN SODIUM 30 MG: 100 INJECTION SUBCUTANEOUS at 08:38

## 2020-12-25 RX ADMIN — DESMOPRESSIN ACETATE 40 MG: 0.2 TABLET ORAL at 20:09

## 2020-12-25 RX ADMIN — FAMOTIDINE 20 MG: 10 INJECTION INTRAVENOUS at 08:38

## 2020-12-25 RX ADMIN — SODIUM CHLORIDE, PRESERVATIVE FREE 10 ML: 5 INJECTION INTRAVENOUS at 08:38

## 2020-12-25 RX ADMIN — SODIUM CHLORIDE, PRESERVATIVE FREE 10 ML: 5 INJECTION INTRAVENOUS at 20:09

## 2020-12-25 RX ADMIN — CARVEDILOL 3.12 MG: 3.12 TABLET, FILM COATED ORAL at 17:45

## 2020-12-25 RX ADMIN — STANDARDIZED SENNA CONCENTRATE AND DOCUSATE SODIUM 1 TABLET: 8.6; 5 TABLET ORAL at 20:08

## 2020-12-25 RX ADMIN — DIVALPROEX SODIUM 500 MG: 500 TABLET, EXTENDED RELEASE ORAL at 20:08

## 2020-12-25 RX ADMIN — ENOXAPARIN SODIUM 30 MG: 100 INJECTION SUBCUTANEOUS at 20:09

## 2020-12-25 RX ADMIN — FAMOTIDINE 20 MG: 10 INJECTION INTRAVENOUS at 20:09

## 2020-12-25 RX ADMIN — DIVALPROEX SODIUM 500 MG: 500 TABLET, EXTENDED RELEASE ORAL at 08:38

## 2020-12-25 ASSESSMENT — PAIN SCALES - GENERAL: PAINLEVEL_OUTOF10: 0

## 2020-12-25 NOTE — PROGRESS NOTES
[id number] [facility] Neurology   [address] [place] [address]    Progress note             Date:   12/25/2020  Patient name:  Jes Everett  Date of admission:  12/9/2020  1:32 PM  MRN:   [id number]  Account:  [de-identified]  YOB: 1945  PCP:    No primary care provider on file. Room:   [address] [place] [address]  Code Status:    DNR-CCA    Chief Complaint:     Chief Complaint   Patient presents with    Cerebrovascular Accident   Status post mass resection    Interval hx:     No acute events overnight, no significant change patient neurological status. He is awake alert and able to follow commands. He remains on Depakote 500 twice daily. Will follow up with neurology and neurosurgery as outpatient. Awaiting disposition. Brief History of Present Illness:       78-year-old male who initially presented for altered mental status after nursing the Facebook posts were not making sense at home.  Following day, the patient's mental status declined and he became less responsive.  Brought to the hospital with an NIH stroke scale of 20, aphasia.  Patient was found to have a left posterior temporal lobe mass with vasogenic edema.  He underwent resection of underlying mass.  This is consistent with GBM.  Hematology was also consulted. Past Medical History:     History reviewed. No pertinent past medical history. Past Surgical History:     Past Surgical History:   Procedure Laterality Date    CRANIOTOMY Left 12/14/2020    temporal for tumor    CRANIOTOMY Left 12/14/2020    TEMPORAL CRANIOTOMY FOR TUMOR, IMAGE GUIDED, AWAKE performed by Meryl oCbian DO at [place] [address]        Medications Prior to Admission:     Prior to Admission medications    Not on File        Allergies:     Patient has no known allergies. Social History:     Tobacco:    has no history on file for tobacco.  Alcohol:      has no history on file for alcohol. Drug Use:  has no history on file for drug.     Family History: History reviewed. No pertinent family history. Review of Systems:     ROS:  Constitutional  Negative for fever and chills    HEENT  Negative for ear discharge, ear pain, nosebleed    Eyes  Negative for photophobia, pain and discharge    Respiratory  Negative for hemoptysis and sputum    Cardiovascular  Negative for orthopnea, claudication and PND    Gastrointestinal  Negative for abdominal pain, diarrhea, blood in stool    Musculoskeletal  Negative for joint pain, negative for myalgia    Neurology Negative for seizures, loss of consciousness   Skin  Negative for rash or itching    Endo/heme/allergies  Negative for polydipsia, environmental allergy    Psychiatric/behavioral  Negative for suicidal ideation. Patient is not anxious        Physical Exam:   /68   Pulse 66   Temp 97 °F (36.1 °C) (Oral)   Resp 17   Ht 6' 2\" (1.88 m)   Wt 200 lb (90.7 kg)   SpO2 98%   BMI 25.68 kg/m²   Temp (24hrs), Av.6 °F (36.4 °C), Min:97 °F (36.1 °C), Max:98 °F (36.7 °C)    Recent Labs     20  0817 20  1111 20  1545 20   POCGLU 84 128* 131* 124*     No intake or output data in the 24 hours ending 20 0939      NEUROLOGIC EXAMINATION  GENERAL  Appears comfortable and in no distress   HEENT  NC/ AT   NECK  Supple and no bruits heard   MENTAL STATUS:  Alert, oriented x2, impaired memory, expressive aphasia with some dysarthria.    CRANIAL NERVES: II     -      Visual fields intact to confrontation  III,IV,VI -  EOMs full, no afferent defect, no NEW, no ptosis  V     -     Normal facial sensation  VII    -     Normal facial symmetry  VIII   -     Intact hearing  IX,X -     Symmetrical palate  XI    -     Symmetrical shoulder shrug  XII   -     Midline tongue, no atrophy    MOTOR FUNCTION:  Moving all extremities spontaneously   SENSORY FUNCTION:  Normal touch, normal pin, normal vibration, normal proprioception   CEREBELLAR FUNCTION:  Intact fine motor control over upper limbs REFLEX FUNCTION:  Symmetric, no perverted reflex, no Babinski sign   STATION and GAIT  Not tested       Investigations:      Laboratory Testing:  Recent Results (from the past 24 hour(s))   Basic Metabolic Panel    Collection Time: 12/25/20  4:34 AM   Result Value Ref Range    Glucose 93 70 - 99 mg/dL    BUN 21 8 - 23 mg/dL    CREATININE 0.73 0.70 - 1.20 mg/dL    Bun/Cre Ratio NOT REPORTED 9 - 20    Calcium 8.1 (L) 8.6 - 10.4 mg/dL    Sodium 135 135 - 144 mmol/L    Potassium 4.1 3.7 - 5.3 mmol/L    Chloride 105 98 - 107 mmol/L    CO2 23 20 - 31 mmol/L    Anion Gap 7 (L) 9 - 17 mmol/L    GFR Non-African American >60 >60 mL/min    GFR African American >60 >60 mL/min    GFR Comment          GFR Staging NOT REPORTED    CBC    Collection Time: 12/25/20  4:34 AM   Result Value Ref Range    WBC 6.2 3.5 - 11.3 k/uL    RBC 4.76 4.21 - 5.77 m/uL    Hemoglobin 14.9 13.0 - 17.0 g/dL    Hematocrit 46.1 40.7 - 50.3 %    MCV 96.8 82.6 - 102.9 fL    MCH 31.3 25.2 - 33.5 pg    MCHC 32.3 28.4 - 34.8 g/dL    RDW 13.3 11.8 - 14.4 %    Platelets 382 384 - 511 k/uL    MPV 9.4 8.1 - 13.5 fL    NRBC Automated 0.0 0.0 per 100 WBC   PHOSPHORUS    Collection Time: 12/25/20  4:34 AM   Result Value Ref Range    Phosphorus 2.7 2.5 - 4.5 mg/dL   CALCIUM, IONIZED    Collection Time: 12/25/20  4:34 AM   Result Value Ref Range    Calcium, Ion 1.05 (L) 1.13 - 1.33 mmol/L         Assessment :      Primary Problem  <principal problem not specified>    Active Hospital Problems    Diagnosis Date Noted    Thrombocytopenia (HCC) [D69.6]     GBM (glioblastoma multiforme) (HCC) [C71.9]     Acute encephalopathy [G93.40]     Cerebral edema ([address] [G93.6]     Brain mass [G93.89] 12/09/2020       Plan:     77year-old status post resection of temporal mass which was consistent with GBM. Oncology is on board, patient's dysarthria has continued to improve. Patient's mentation continues to improve and he is awake and able to follow commands. He remains on Depakote, awaiting disposition, patient will follow up with neurology and neurosurgery as outpatient. Vitals have been stable. NeurologicalGBM s/p resection. Awaiting disposition  Continue to monitor for neurological changes. Continue Depakote 500 every 12. Seizure precautions as directed  Neurology follow-up as outpatient  Neurosurgery follow-up as outpatient  Continue statin therapy. Follow-up further recommendations after discussing the case with attending. The plan was discussed with the patient, patient's family and the medical staff. Consultations:   IP CONSULT TO STROKE TEAM  IP CONSULT TO NEUROSURGERY  IP CONSULT TO NEUROCRITICAL CARE  IP CONSULT TO PHYSICAL MEDICINE REHAB  IP CONSULT TO CARDIOLOGY  IP CONSULT TO PHYSICAL MEDICINE REHAB  IP CONSULT TO HEM/ONC  IP CONSULT TO HEM/ONC  IP CONSULT TO NEUROLOGY  IP CONSULT TO PALLIATIVE CARE  IP CONSULT TO ETHICS    Patient is admitted as inpatient status because of co-morbidities listed above, severity of signs and symptoms as outlined, requirement for current medical therapies and most importantly because of direct risk to patient if care not provided in a hospital setting. Romana Neptune, MD  12/25/2020  9:39 AM    Copy sent to Dr. Gayle primary care provider on file.

## 2020-12-25 NOTE — PLAN OF CARE
Problem: Falls - Risk of:  Goal: Will remain free from falls  Description: Will remain free from falls  Outcome: Met This Shift  Goal: Absence of physical injury  Description: Absence of physical injury  Outcome: Met This Shift    Pt assessed as a fall risk this shift. Remains free from falls and accidental injury at this time. Fall precautions in place, including falling star sign. Floor free from obstacles, and bed is locked and in lowest position. Adequate lighting provided. Pt encouraged to call before getting Out Of Bed for any need. Will continue to monitor needs during hourly rounding, and reinforce education on use of call light.

## 2020-12-26 LAB
ANION GAP SERPL CALCULATED.3IONS-SCNC: 6 MMOL/L (ref 9–17)
BUN BLDV-MCNC: 23 MG/DL (ref 8–23)
BUN/CREAT BLD: ABNORMAL (ref 9–20)
CALCIUM IONIZED: 0.98 MMOL/L (ref 1.13–1.33)
CALCIUM SERPL-MCNC: 8.3 MG/DL (ref 8.6–10.4)
CHLORIDE BLD-SCNC: 106 MMOL/L (ref 98–107)
CO2: 22 MMOL/L (ref 20–31)
CREAT SERPL-MCNC: 0.81 MG/DL (ref 0.7–1.2)
GFR AFRICAN AMERICAN: >60 ML/MIN
GFR NON-AFRICAN AMERICAN: >60 ML/MIN
GFR SERPL CREATININE-BSD FRML MDRD: ABNORMAL ML/MIN/{1.73_M2}
GFR SERPL CREATININE-BSD FRML MDRD: ABNORMAL ML/MIN/{1.73_M2}
GLUCOSE BLD-MCNC: 97 MG/DL (ref 70–99)
HCT VFR BLD CALC: 46.2 % (ref 40.7–50.3)
HEMOGLOBIN: 14.8 G/DL (ref 13–17)
MCH RBC QN AUTO: 31.4 PG (ref 25.2–33.5)
MCHC RBC AUTO-ENTMCNC: 32 G/DL (ref 28.4–34.8)
MCV RBC AUTO: 97.9 FL (ref 82.6–102.9)
NRBC AUTOMATED: 0 PER 100 WBC
PDW BLD-RTO: 13.3 % (ref 11.8–14.4)
PHOSPHORUS: 2.3 MG/DL (ref 2.5–4.5)
PLATELET # BLD: 138 K/UL (ref 138–453)
PMV BLD AUTO: 9.5 FL (ref 8.1–13.5)
POTASSIUM SERPL-SCNC: 4.3 MMOL/L (ref 3.7–5.3)
RBC # BLD: 4.72 M/UL (ref 4.21–5.77)
SODIUM BLD-SCNC: 134 MMOL/L (ref 135–144)
WBC # BLD: 5.5 K/UL (ref 3.5–11.3)

## 2020-12-26 PROCEDURE — 6370000000 HC RX 637 (ALT 250 FOR IP): Performed by: PHYSICIAN ASSISTANT

## 2020-12-26 PROCEDURE — 6360000002 HC RX W HCPCS: Performed by: STUDENT IN AN ORGANIZED HEALTH CARE EDUCATION/TRAINING PROGRAM

## 2020-12-26 PROCEDURE — 2500000003 HC RX 250 WO HCPCS: Performed by: PHYSICIAN ASSISTANT

## 2020-12-26 PROCEDURE — 6370000000 HC RX 637 (ALT 250 FOR IP): Performed by: NURSE PRACTITIONER

## 2020-12-26 PROCEDURE — 85027 COMPLETE CBC AUTOMATED: CPT

## 2020-12-26 PROCEDURE — 36415 COLL VENOUS BLD VENIPUNCTURE: CPT

## 2020-12-26 PROCEDURE — 6370000000 HC RX 637 (ALT 250 FOR IP): Performed by: PSYCHIATRY & NEUROLOGY

## 2020-12-26 PROCEDURE — 80048 BASIC METABOLIC PNL TOTAL CA: CPT

## 2020-12-26 PROCEDURE — 2580000003 HC RX 258: Performed by: PHYSICIAN ASSISTANT

## 2020-12-26 PROCEDURE — 2060000000 HC ICU INTERMEDIATE R&B

## 2020-12-26 PROCEDURE — 99232 SBSQ HOSP IP/OBS MODERATE 35: CPT | Performed by: INTERNAL MEDICINE

## 2020-12-26 PROCEDURE — 82330 ASSAY OF CALCIUM: CPT

## 2020-12-26 PROCEDURE — 84100 ASSAY OF PHOSPHORUS: CPT

## 2020-12-26 RX ADMIN — DIVALPROEX SODIUM 500 MG: 500 TABLET, EXTENDED RELEASE ORAL at 21:43

## 2020-12-26 RX ADMIN — ENOXAPARIN SODIUM 30 MG: 100 INJECTION SUBCUTANEOUS at 08:22

## 2020-12-26 RX ADMIN — CARVEDILOL 3.12 MG: 3.12 TABLET, FILM COATED ORAL at 17:13

## 2020-12-26 RX ADMIN — STANDARDIZED SENNA CONCENTRATE AND DOCUSATE SODIUM 1 TABLET: 8.6; 5 TABLET ORAL at 21:43

## 2020-12-26 RX ADMIN — DESMOPRESSIN ACETATE 40 MG: 0.2 TABLET ORAL at 21:44

## 2020-12-26 RX ADMIN — VENLAFAXINE 37.5 MG: 37.5 TABLET ORAL at 08:22

## 2020-12-26 RX ADMIN — FAMOTIDINE 20 MG: 10 INJECTION INTRAVENOUS at 21:43

## 2020-12-26 RX ADMIN — CARVEDILOL 3.12 MG: 3.12 TABLET, FILM COATED ORAL at 08:23

## 2020-12-26 RX ADMIN — DIVALPROEX SODIUM 500 MG: 500 TABLET, EXTENDED RELEASE ORAL at 08:23

## 2020-12-26 RX ADMIN — FAMOTIDINE 20 MG: 10 INJECTION INTRAVENOUS at 08:22

## 2020-12-26 RX ADMIN — STANDARDIZED SENNA CONCENTRATE AND DOCUSATE SODIUM 1 TABLET: 8.6; 5 TABLET ORAL at 08:22

## 2020-12-26 RX ADMIN — SODIUM CHLORIDE, PRESERVATIVE FREE 10 ML: 5 INJECTION INTRAVENOUS at 08:23

## 2020-12-26 RX ADMIN — SODIUM CHLORIDE, PRESERVATIVE FREE 10 ML: 5 INJECTION INTRAVENOUS at 21:44

## 2020-12-26 ASSESSMENT — PAIN SCALES - GENERAL: PAINLEVEL_OUTOF10: 0

## 2020-12-26 NOTE — PROGRESS NOTES
[facility]   [facility]    Progress note             Date:   12/26/2020  Patient name:  Richmond Aguilar  Date of admission:  12/9/2020  1:32 PM  MRN:   [id number]  Account:  [de-identified]  YOB: 1945  PCP:    No primary care provider on file. Room:   [address] [place] [address]  Code Status:    DNR-CCA    Chief Complaint:     Chief Complaint   Patient presents with    Cerebrovascular Accident   Status post GBM resection    Interval hx:     No acute events overnight, no change in patient's neurological status. He does display some expressive aphasia and has difficulty finding words. Overall there is been significant improvement. Awaiting disposition, he remains on Depakote. Brief History of Present Illness:     73-year-old male who initially presented for altered mental status after nursing the Facebook posts were not making sense at home.  Following day, the patient's mental status declined and he became less responsive.  Brought to the hospital with an NIH stroke scale of 20, aphasia.  Patient was found to have a left posterior temporal lobe mass with vasogenic edema.  He underwent resection of underlying mass.  This is consistent with GBM.  Hematology is also on board. Past Medical History:     History reviewed. No pertinent past medical history. Past Surgical History:     Past Surgical History:   Procedure Laterality Date    CRANIOTOMY Left 12/14/2020    temporal for tumor    CRANIOTOMY Left 12/14/2020    TEMPORAL CRANIOTOMY FOR TUMOR, IMAGE GUIDED, AWAKE performed by Latricia Patterson DO at [place] [address]        Medications Prior to Admission:     Prior to Admission medications    Not on File        Allergies:     Patient has no known allergies. Social History:     Tobacco:    has no history on file for tobacco.  Alcohol:      has no history on file for alcohol. Drug Use:  has no history on file for drug.     Family History: History reviewed. No pertinent family history. Review of Systems:     ROS:  Constitutional  Negative for fever and chills    HEENT  Negative for ear discharge, ear pain, nosebleed    Eyes  Negative for photophobia, pain and discharge    Respiratory  Negative for hemoptysis and sputum    Cardiovascular  Negative for orthopnea, claudication and PND    Gastrointestinal  Negative for abdominal pain, diarrhea, blood in stool    Musculoskeletal  Negative for joint pain, negative for myalgia    Neurology Negative for seizures, loss of consciousness   Skin  Negative for rash or itching    Endo/heme/allergies  Negative for polydipsia, environmental allergy    Psychiatric/behavioral  Negative for suicidal ideation.  Patient is not anxious        Physical Exam:   /60   Pulse 66   Temp 98.1 °F (36.7 °C) (Oral)   Resp 20   Ht 6' 2\" (1.88 m)   Wt 200 lb (90.7 kg)   SpO2 96%   BMI 25.68 kg/m²   Temp (24hrs), Av.1 °F (36.2 °C), Min:96.1 °F (35.6 °C), Max:98.1 °F (36.7 °C)    Recent Labs     20  1545 20   POCGLU 131* 124*     No intake or output data in the 24 hours ending 20 1124      NEUROLOGIC EXAMINATION  GENERAL  Appears comfortable and in no distress   HEENT  NC/ AT   NECK  Supple and no bruits heard   MENTAL STATUS:  Alert, oriented x1, impaired memory, expressive aphasia word finding difficulties   CRANIAL NERVES: II     -      Visual fields intact to confrontation  III,IV,VI -  EOMs full, no afferent defect, no NEW, no ptosis  V     -     Normal facial sensation  VII    -     Normal facial symmetry  VIII   -     Intact hearing  IX,X -     Symmetrical palate  XI    -     Symmetrical shoulder shrug  XII   -     Midline tongue, no atrophy    MOTOR FUNCTION:  significant for good strength of grade 5/5 in bilateral proximal and distal muscle groups of both upper and lower extremities with normal bulk, normal tone and no involuntary movements, no tremor SENSORY FUNCTION:  Normal touch, normal pin, normal vibration, normal proprioception   CEREBELLAR FUNCTION:  Intact fine motor control over upper limbs   REFLEX FUNCTION:  Symmetric, no perverted reflex, no Babinski sign   STATION and GAIT  Not tested       Investigations:      Laboratory Testing:  Recent Results (from the past 24 hour(s))   Basic Metabolic Panel    Collection Time: 12/26/20  4:39 AM   Result Value Ref Range    Glucose 97 70 - 99 mg/dL    BUN 23 8 - 23 mg/dL    CREATININE 0.81 0.70 - 1.20 mg/dL    Bun/Cre Ratio NOT REPORTED 9 - 20    Calcium 8.3 (L) 8.6 - 10.4 mg/dL    Sodium 134 (L) 135 - 144 mmol/L    Potassium 4.3 3.7 - 5.3 mmol/L    Chloride 106 98 - 107 mmol/L    CO2 22 20 - 31 mmol/L    Anion Gap 6 (L) 9 - 17 mmol/L    GFR Non-African American >60 >60 mL/min    GFR African American >60 >60 mL/min    GFR Comment          GFR Staging NOT REPORTED    CBC    Collection Time: 12/26/20  4:39 AM   Result Value Ref Range    WBC 5.5 3.5 - 11.3 k/uL    RBC 4.72 4.21 - 5.77 m/uL    Hemoglobin 14.8 13.0 - 17.0 g/dL    Hematocrit 46.2 40.7 - 50.3 %    MCV 97.9 82.6 - 102.9 fL    MCH 31.4 25.2 - 33.5 pg    MCHC 32.0 28.4 - 34.8 g/dL    RDW 13.3 11.8 - 14.4 %    Platelets 731 878 - 889 k/uL    MPV 9.5 8.1 - 13.5 fL    NRBC Automated 0.0 0.0 per 100 WBC   PHOSPHORUS    Collection Time: 12/26/20  4:39 AM   Result Value Ref Range    Phosphorus 2.3 (L) 2.5 - 4.5 mg/dL   CALCIUM, IONIZED    Collection Time: 12/26/20  4:39 AM   Result Value Ref Range    Calcium, Ion 0.98 (L) 1.13 - 1.33 mmol/L         Assessment :      Primary Problem  <principal problem not specified>    Active Hospital Problems    Diagnosis Date Noted    Thrombocytopenia (HCC) [D69.6]     GBM (glioblastoma multiforme) (HCC) [C71.9]     Acute encephalopathy [G93.40]     Cerebral edema ([address] [G93.6]     Brain mass [G93.89] 12/09/2020       Plan:

## 2020-12-26 NOTE — PROGRESS NOTES
Today's Date: 12/26/2020  Patient Name: Sandi Silveira  Date of admission: 12/9/2020  1:32 PM  Patient's age: 66 y.o., 1945  Admission Dx: Brain mass [G93.89]    Reason for Consult: management recommendations for left brain mass  Requesting Physician: Moraima Cherry MD    CHIEF COMPLAINT: Altered mental status    SUBJECTIVE:  Patient was seen and examined. Labs and vitals reviewed  No new complaint interval event  Denies nausea vomiting fever chills      BRIEF CASE HISTORY:    The patient is a 66 y.o.  male who is admitted to the hospital for acute encephalopathy and left brain mass. Patient presented with confusion and strokelike symptoms about a week ago. Found to have a left temporal lobe mass with cerebral edema and midline shift on brain imaging. He was managing neuro ICU and received steroids and antiseizure medications. He underwent craniotomy with resection of left temporal lobe tumor on 12/14/2020. Pathology report on sample obtained showed grade 4 glioblastoma multiforme. His past medical history includes essential hypertension and hyperlipidemia. He lost his wife to cancer. His 2 daughters are his designated health power of . Patient is pleasantly confused and is very hard of hearing. He appears to understand simple commands and conversations. He is currently on the neuro stepdown floor. Past Medical History:   has no past medical history on file. Past Surgical History:   has a past surgical history that includes craniotomy (Left, 12/14/2020) and craniotomy (Left, 12/14/2020). Medications:    Reviewed in Epic     Allergies:  Patient has no known allergies. Social History:        Family History: family history is not on file. Family history was reviewed and no pertinent family history noted. REVIEW OF SYSTEMS: Limited due to patient's altered mental status  Constitutional: No fever or chills. Recent Labs     12/25/20  0434 12/26/20  0439    134*   K 4.1 4.3   CO2 23 22   BUN 21 23   CREATININE 0.73 0.81   LABGLOM >60 >60   GLUCOSE 93 97     PT/INR: No results for input(s): PROTIME, INR in the last 72 hours. IMAGING DATA:      Primary Problem  <principal problem not specified>    Active Hospital Problems    Diagnosis Date Noted    Thrombocytopenia ([facility] [D69.6]     GBM (glioblastoma multiforme) (HCC) [C71.9]     Acute encephalopathy [G93.40]     Cerebral edema ([facility] [G93.6]     Brain mass [G93.89] 12/09/2020       IMPRESSION:   1. Glioblastoma multiforme. High-grade. 2. Cerebral edema. 3. Essential hypertension. 4. Mild thrombocytopenia. RECOMMENDATIONS:  Records, labs and imaging reviewed. Patient recovering from the surgery well  Reviewed results of pathology report showing GBM and discuss treatment. Patient is family wishes to proceed with treatment which will include concurrent chemoradiation as an outpatient  Anticipating patient to be discharged to acute rehab once approved  We will start treatment after discharge from the acute rehab. Destinee Carolina        This note is created with the assistance of a speech recognition program.  While intending to generate a document that actually reflects the content of the visit, the document can still have some errors including those of syntax and sound a like substitutions which may escape proof reading. It such instances, actual meaning can be extrapolated by contextual diversion.

## 2020-12-27 LAB
ANION GAP SERPL CALCULATED.3IONS-SCNC: 11 MMOL/L (ref 9–17)
BUN BLDV-MCNC: 20 MG/DL (ref 8–23)
BUN/CREAT BLD: ABNORMAL (ref 9–20)
CALCIUM IONIZED: 1.13 MMOL/L (ref 1.13–1.33)
CALCIUM SERPL-MCNC: 8.2 MG/DL (ref 8.6–10.4)
CHLORIDE BLD-SCNC: 103 MMOL/L (ref 98–107)
CO2: 22 MMOL/L (ref 20–31)
CREAT SERPL-MCNC: 0.76 MG/DL (ref 0.7–1.2)
GFR AFRICAN AMERICAN: >60 ML/MIN
GFR NON-AFRICAN AMERICAN: >60 ML/MIN
GFR SERPL CREATININE-BSD FRML MDRD: ABNORMAL ML/MIN/{1.73_M2}
GFR SERPL CREATININE-BSD FRML MDRD: ABNORMAL ML/MIN/{1.73_M2}
GLUCOSE BLD-MCNC: 91 MG/DL (ref 70–99)
HCT VFR BLD CALC: 44.3 % (ref 40.7–50.3)
HEMOGLOBIN: 14.6 G/DL (ref 13–17)
MCH RBC QN AUTO: 31.7 PG (ref 25.2–33.5)
MCHC RBC AUTO-ENTMCNC: 33 G/DL (ref 28.4–34.8)
MCV RBC AUTO: 96.1 FL (ref 82.6–102.9)
NRBC AUTOMATED: 0 PER 100 WBC
PDW BLD-RTO: 13.5 % (ref 11.8–14.4)
PHOSPHORUS: 2.3 MG/DL (ref 2.5–4.5)
PLATELET # BLD: NORMAL K/UL (ref 138–453)
PLATELET, FLUORESCENCE: 140 K/UL (ref 138–453)
PLATELET, IMMATURE FRACTION: 1.4 % (ref 1.1–10.3)
PMV BLD AUTO: NORMAL FL (ref 8.1–13.5)
POTASSIUM SERPL-SCNC: 4.1 MMOL/L (ref 3.7–5.3)
RBC # BLD: 4.61 M/UL (ref 4.21–5.77)
SODIUM BLD-SCNC: 136 MMOL/L (ref 135–144)
WBC # BLD: 4.8 K/UL (ref 3.5–11.3)

## 2020-12-27 PROCEDURE — 94761 N-INVAS EAR/PLS OXIMETRY MLT: CPT

## 2020-12-27 PROCEDURE — 6360000002 HC RX W HCPCS: Performed by: STUDENT IN AN ORGANIZED HEALTH CARE EDUCATION/TRAINING PROGRAM

## 2020-12-27 PROCEDURE — 2500000003 HC RX 250 WO HCPCS: Performed by: PHYSICIAN ASSISTANT

## 2020-12-27 PROCEDURE — 85055 RETICULATED PLATELET ASSAY: CPT

## 2020-12-27 PROCEDURE — 6370000000 HC RX 637 (ALT 250 FOR IP): Performed by: NURSE PRACTITIONER

## 2020-12-27 PROCEDURE — 82330 ASSAY OF CALCIUM: CPT

## 2020-12-27 PROCEDURE — 36415 COLL VENOUS BLD VENIPUNCTURE: CPT

## 2020-12-27 PROCEDURE — 99232 SBSQ HOSP IP/OBS MODERATE 35: CPT | Performed by: STUDENT IN AN ORGANIZED HEALTH CARE EDUCATION/TRAINING PROGRAM

## 2020-12-27 PROCEDURE — 99232 SBSQ HOSP IP/OBS MODERATE 35: CPT | Performed by: INTERNAL MEDICINE

## 2020-12-27 PROCEDURE — 6370000000 HC RX 637 (ALT 250 FOR IP): Performed by: PHYSICIAN ASSISTANT

## 2020-12-27 PROCEDURE — 85027 COMPLETE CBC AUTOMATED: CPT

## 2020-12-27 PROCEDURE — 6370000000 HC RX 637 (ALT 250 FOR IP): Performed by: PSYCHIATRY & NEUROLOGY

## 2020-12-27 PROCEDURE — 2580000003 HC RX 258: Performed by: PHYSICIAN ASSISTANT

## 2020-12-27 PROCEDURE — 80048 BASIC METABOLIC PNL TOTAL CA: CPT

## 2020-12-27 PROCEDURE — 2060000000 HC ICU INTERMEDIATE R&B

## 2020-12-27 PROCEDURE — 84100 ASSAY OF PHOSPHORUS: CPT

## 2020-12-27 RX ORDER — FAMOTIDINE 20 MG/1
20 TABLET, FILM COATED ORAL 2 TIMES DAILY
Status: DISCONTINUED | OUTPATIENT
Start: 2020-12-27 | End: 2020-12-28 | Stop reason: HOSPADM

## 2020-12-27 RX ADMIN — DIVALPROEX SODIUM 500 MG: 500 TABLET, EXTENDED RELEASE ORAL at 08:54

## 2020-12-27 RX ADMIN — FAMOTIDINE 20 MG: 10 INJECTION INTRAVENOUS at 08:54

## 2020-12-27 RX ADMIN — STANDARDIZED SENNA CONCENTRATE AND DOCUSATE SODIUM 1 TABLET: 8.6; 5 TABLET ORAL at 20:44

## 2020-12-27 RX ADMIN — CARVEDILOL 3.12 MG: 3.12 TABLET, FILM COATED ORAL at 16:33

## 2020-12-27 RX ADMIN — VENLAFAXINE 37.5 MG: 37.5 TABLET ORAL at 08:54

## 2020-12-27 RX ADMIN — DIVALPROEX SODIUM 500 MG: 500 TABLET, EXTENDED RELEASE ORAL at 20:44

## 2020-12-27 RX ADMIN — ENOXAPARIN SODIUM 30 MG: 100 INJECTION SUBCUTANEOUS at 20:44

## 2020-12-27 RX ADMIN — SODIUM CHLORIDE, PRESERVATIVE FREE 10 ML: 5 INJECTION INTRAVENOUS at 20:45

## 2020-12-27 RX ADMIN — CARVEDILOL 3.12 MG: 3.12 TABLET, FILM COATED ORAL at 08:54

## 2020-12-27 RX ADMIN — SODIUM CHLORIDE, PRESERVATIVE FREE 10 ML: 5 INJECTION INTRAVENOUS at 08:55

## 2020-12-27 RX ADMIN — ENOXAPARIN SODIUM 30 MG: 100 INJECTION SUBCUTANEOUS at 08:54

## 2020-12-27 RX ADMIN — FAMOTIDINE 20 MG: 20 TABLET ORAL at 20:51

## 2020-12-27 RX ADMIN — DESMOPRESSIN ACETATE 40 MG: 0.2 TABLET ORAL at 20:44

## 2020-12-27 ASSESSMENT — PAIN SCALES - GENERAL: PAINLEVEL_OUTOF10: 0

## 2020-12-27 NOTE — PROGRESS NOTES
Today's Date: 12/27/2020  Patient Name: Mil Alejandra  Date of admission: 12/9/2020  1:32 PM  Patient's age: 66 y.o., 1945  Admission Dx: Brain mass [G93.89]    Reason for Consult: management recommendations for left brain mass  Requesting Physician: Maryann Hamilton MD    CHIEF COMPLAINT: Altered mental status    SUBJECTIVE:  Patient was seen and examined. Labs and vitals reviewed  No new complaint or interval event  Awaiting placement at the rehab  Patient resting comfortably    BRIEF CASE HISTORY:    The patient is a 66 y.o.  male who is admitted to the hospital for acute encephalopathy and left brain mass. Patient presented with confusion and strokelike symptoms about a week ago. Found to have a left temporal lobe mass with cerebral edema and midline shift on brain imaging. He was managing neuro ICU and received steroids and antiseizure medications. He underwent craniotomy with resection of left temporal lobe tumor on 12/14/2020. Pathology report on sample obtained showed grade 4 glioblastoma multiforme. His past medical history includes essential hypertension and hyperlipidemia. He lost his wife to cancer. His 2 daughters are his designated health power of . Patient is pleasantly confused and is very hard of hearing. He appears to understand simple commands and conversations. He is currently on the neuro stepdown floor. Past Medical History:   has no past medical history on file. Past Surgical History:   has a past surgical history that includes craniotomy (Left, 12/14/2020) and craniotomy (Left, 12/14/2020). Medications:    Reviewed in Epic     Allergies:  Patient has no known allergies. Social History:        Family History: family history is not on file. Family history was reviewed and no pertinent family history noted. REVIEW OF SYSTEMS: Limited due to patient's altered mental status  Constitutional: No fever or chills. Eyes: No eye discharge, double vision, or eye pain   HEENT: negative for sore mouth, sore throat, hoarseness and voice change   Respiratory: negative for cough , sputum, dyspnea, wheezing, hemoptysis, chest pain   Cardiovascular: negative for chest pain, dyspnea,  Gastrointestinal: negative for nausea, vomiting, diarrhea, constipation, abdominal pain, dysphagia  Genitourinary: negative for frequency, dysuria, nocturia, urinary incontinence, and hematuria   Integument: negative for rash   Hematologic/Lymphatic: negative for easy bruising, bleeding  Musculoskeletal: negative for myalgias, arthralgias, pain  Neurological: Positive for weakness, negative for headaches, dizziness    PHYSICAL EXAM:      BP (!) 112/44   Pulse 56   Temp 98.1 °F (36.7 °C) (Oral)   Resp 16   Ht 6' 2\" (1.88 m)   Wt 200 lb (90.7 kg)   SpO2 98%   BMI 25.68 kg/m²    Temp (24hrs), Av °F (36.7 °C), Min:97.9 °F (36.6 °C), Max:98.1 °F (36.7 °C)    General appearance - well appearing, not in pain or distress   Mental status - alert and cooperative   Eyes - pupils equal and reactive, extraocular eye movements intact   Ears -not examined  Mouth - mucous membranes moist, pharynx normal without lesions   Neck - supple, no significant adenopathy   Lymphatics - no palpable lymphadenopathy, no hepatosplenomegaly   Chest - clear to auscultation, no wheezes, rales or rhonchi, symmetric air entry   Heart - normal rate, regular rhythm, normal S1, S2, no murmurs  Abdomen - soft, nontender, nondistended, no masses or organomegaly   Neurological - alert, not oriented, distorted speech  Musculoskeletal - no joint tenderness, deformity or swelling   Extremities - peripheral pulses normal, no pedal edema, no clubbing or cyanosis   Skin - normal coloration and turgor, no rashes, no suspicious skin lesions noted ,    DATA:    Labs:   CBC:   Recent Labs     20  0439 20  0728   WBC 5.5 4.8   HGB 14.8 14.6   HCT 46.2 44.3  See Reflexed IPF Result     BMP:   Recent Labs     12/26/20  0439 12/27/20  0728   * 136   K 4.3 4.1   CO2 22 22   BUN 23 20   CREATININE 0.81 0.76   LABGLOM >60 >60   GLUCOSE 97 91     PT/INR: No results for input(s): PROTIME, INR in the last 72 hours. IMAGING DATA:      Primary Problem  <principal problem not specified>    Active Hospital Problems    Diagnosis Date Noted    Thrombocytopenia ([facility] [D69.6]     GBM (glioblastoma multiforme) (HCC) [C71.9]     Acute encephalopathy [G93.40]     Cerebral edema ([facility] [G93.6]     Brain mass [G93.89] 12/09/2020       IMPRESSION:   1. Glioblastoma multiforme. High-grade. 2. Cerebral edema. 3. Essential hypertension. 4. Mild thrombocytopenia. RECOMMENDATIONS:  Records, labs and imaging reviewed. S/p resection of GBM patient recovering well  Patient family wishes to proceed with further treatment as an outpatient  Okay to discharge to rehab. From oncology standpoint patient will be started on treatment after discharge from rehab. Patient will also be need to be seen by radiation oncology as an outpatient. Shahid Christiansen        This note is created with the assistance of a speech recognition program.  While intending to generate a document that actually reflects the content of the visit, the document can still have some errors including those of syntax and sound a like substitutions which may escape proof reading. It such instances, actual meaning can be extrapolated by contextual diversion.

## 2020-12-27 NOTE — PROGRESS NOTES
[facility] Neurology   [facility]    Progress note             Date:   12/27/2020  Patient name:  Lillie Malik  Date of admission:  12/9/2020  1:32 PM  MRN:   [id number]  Account:  [de-identified]  YOB: 1945  PCP:    No primary care provider on file. Room:   [address] [place] [address]  Code Status:    DNR-CCA    Chief Complaint:     Chief Complaint   Patient presents with    Cerebrovascular Accident   Status post GBM resection    Interval hx:     No acute events overnight, no change in patient's neurological status, he continues to display intermittent expressive aphasia but overall has significantly improved since presentation. He remains on Depakote and is awaiting discharge. Brief History of Present Illness:     78-year-old male who initially presented for altered mental status after nursing the Facebook posts were not making sense at home.  Following day, the patient's mental status declined and he became less responsive.  Brought to the hospital with an NIH stroke scale of 20, aphasia.  Patient was found to have a left posterior temporal lobe mass with vasogenic edema.  He underwent resection of underlying mass. This was consistent with GBM, hematology is currently on board. Past Medical History:     History reviewed. No pertinent past medical history. Past Surgical History:     Past Surgical History:   Procedure Laterality Date    CRANIOTOMY Left 12/14/2020    temporal for tumor    CRANIOTOMY Left 12/14/2020    TEMPORAL CRANIOTOMY FOR TUMOR, IMAGE GUIDED, AWAKE performed by Chely Sy DO at [place] [id number]        Medications Prior to Admission:     Prior to Admission medications    Not on File        Allergies:     Patient has no known allergies. Social History:     Tobacco:    has no history on file for tobacco.  Alcohol:      has no history on file for alcohol. Drug Use:  has no history on file for drug.     Family History: History reviewed. No pertinent family history. Review of Systems:     ROS:  Constitutional  Negative for fever and chills    HEENT  Negative for ear discharge, ear pain, nosebleed    Eyes  Negative for photophobia, pain and discharge    Respiratory  Negative for hemoptysis and sputum    Cardiovascular  Negative for orthopnea, claudication and PND    Gastrointestinal  Negative for abdominal pain, diarrhea, blood in stool    Musculoskeletal  Negative for joint pain, negative for myalgia    Neurology Negative for seizures, loss of consciousness   Skin  Negative for rash or itching    Endo/heme/allergies  Negative for polydipsia, environmental allergy    Psychiatric/behavioral  Negative for suicidal ideation. Patient is not anxious        Physical Exam:   /73   Pulse 56   Temp 98.1 °F (36.7 °C) (Oral)   Resp 16   Ht 6' 2\" (1.88 m)   Wt 200 lb (90.7 kg)   SpO2 98%   BMI 25.68 kg/m²   Temp (24hrs), Av.8 °F (36.6 °C), Min:97.5 °F (36.4 °C), Max:98.1 °F (36.7 °C)    No results for input(s): POCGLU in the last 72 hours. Intake/Output Summary (Last 24 hours) at 2020 1117  Last data filed at 2020 0800  Gross per 24 hour   Intake    Output 700 ml   Net -700 ml         NEUROLOGIC EXAMINATION  GENERAL  Appears comfortable and in no distress   HEENT  NC/ AT   NECK  Supple and no bruits heard   MENTAL STATUS:  Alert, oriented x1 with expressive aphasia and word finding difficulties. CRANIAL NERVES: II     -      Visual fields intact to confrontation  III,IV,VI -  EOMs full, no afferent defect, no NEW, no ptosis  V     -     Normal facial sensation  VII    -     Normal facial symmetry  VIII   -     Intact hearing  IX,X -     Symmetrical palate  XI    -     Symmetrical shoulder shrug  XII   -     Midline tongue, no atrophy    MOTOR FUNCTION:  Moving all extremities spontaneously, normal bulk   SENSORY FUNCTION:  Intact to LT in upper and lower extremities bilaterally. CEREBELLAR FUNCTION:  Intact fine motor control over upper limbs   REFLEX FUNCTION:  Symmetric, no perverted reflex, no Babinski sign   STATION and GAIT  Not tested       Investigations:      Laboratory Testing:  Recent Results (from the past 24 hour(s))   Basic Metabolic Panel    Collection Time: 12/27/20  7:28 AM   Result Value Ref Range    Glucose 91 70 - 99 mg/dL    BUN 20 8 - 23 mg/dL    CREATININE 0.76 0.70 - 1.20 mg/dL    Bun/Cre Ratio NOT REPORTED 9 - 20    Calcium 8.2 (L) 8.6 - 10.4 mg/dL    Sodium 136 135 - 144 mmol/L    Potassium 4.1 3.7 - 5.3 mmol/L    Chloride 103 98 - 107 mmol/L    CO2 22 20 - 31 mmol/L    Anion Gap 11 9 - 17 mmol/L    GFR Non-African American >60 >60 mL/min    GFR African American >60 >60 mL/min    GFR Comment          GFR Staging NOT REPORTED    CBC    Collection Time: 12/27/20  7:28 AM   Result Value Ref Range    WBC 4.8 3.5 - 11.3 k/uL    RBC 4.61 4.21 - 5.77 m/uL    Hemoglobin 14.6 13.0 - 17.0 g/dL    Hematocrit 44.3 40.7 - 50.3 %    MCV 96.1 82.6 - 102.9 fL    MCH 31.7 25.2 - 33.5 pg    MCHC 33.0 28.4 - 34.8 g/dL    RDW 13.5 11.8 - 14.4 %    Platelets See Reflexed IPF Result 138 - 453 k/uL    MPV NOT REPORTED 8.1 - 13.5 fL    NRBC Automated 0.0 0.0 per 100 WBC   PHOSPHORUS    Collection Time: 12/27/20  7:28 AM   Result Value Ref Range    Phosphorus 2.3 (L) 2.5 - 4.5 mg/dL   CALCIUM, IONIZED    Collection Time: 12/27/20  7:28 AM   Result Value Ref Range    Calcium, Ion 1.13 1.13 - 1.33 mmol/L   Immature Platelet Fraction    Collection Time: 12/27/20  7:28 AM   Result Value Ref Range    Platelet, Immature Fraction 1.4 1.1 - 10.3 %    Platelet, Fluorescence 140 138 - 453 k/uL         Assessment :      Primary Problem  <principal problem not specified>    Active Hospital Problems    Diagnosis Date Noted    Thrombocytopenia (HCC) [D69.6]     GBM (glioblastoma multiforme) (HCC) [C71.9]     Acute encephalopathy [G93.40]     Cerebral edema ([address] [G93.6]  Brain mass [G93.89] 12/09/2020       Plan:     68-old male status post resection of temporal mass which was consistent with GBM. Patient has been neurologically stable with some mild expressive aphasia and word finding difficulties. He remains on Depakote and his vitals have been stable. Awaiting discharge, probable in a.m. NeurologicalGBM status post resection  Continue to monitor for any neurological changes. Seizure precautions as directed. Continue Depakote at current dose. Neurosurgery follow-up as outpatient. Continuing statin therapy. DVT prophylaxis. Probable DC in a.m. Follow-up further recommendations after discussing the case with attending  The plan was discussed with the patient, patient's family and the medical staff. Consultations:   IP CONSULT TO STROKE TEAM  IP CONSULT TO NEUROSURGERY  IP CONSULT TO NEUROCRITICAL CARE  IP CONSULT TO PHYSICAL MEDICINE REHAB  IP CONSULT TO CARDIOLOGY  IP CONSULT TO PHYSICAL MEDICINE REHAB  IP CONSULT TO HEM/ONC  IP CONSULT TO HEM/ONC  IP CONSULT TO NEUROLOGY  IP CONSULT TO PALLIATIVE CARE  IP CONSULT TO ETHICS    Patient is admitted as inpatient status because of co-morbidities listed above, severity of signs and symptoms as outlined, requirement for current medical therapies and most importantly because of direct risk to patient if care not provided in a hospital setting. Adrianna Negron MD  12/27/2020  11:18 AM    Copy sent to Dr. Gayle primary care provider on file.

## 2020-12-28 VITALS
OXYGEN SATURATION: 98 % | SYSTOLIC BLOOD PRESSURE: 137 MMHG | WEIGHT: 192 LBS | RESPIRATION RATE: 16 BRPM | HEIGHT: 74 IN | TEMPERATURE: 97.8 F | BODY MASS INDEX: 24.64 KG/M2 | DIASTOLIC BLOOD PRESSURE: 62 MMHG | HEART RATE: 67 BPM

## 2020-12-28 LAB
ANION GAP SERPL CALCULATED.3IONS-SCNC: 8 MMOL/L (ref 9–17)
BUN BLDV-MCNC: 16 MG/DL (ref 8–23)
BUN/CREAT BLD: ABNORMAL (ref 9–20)
CALCIUM IONIZED: 1.06 MMOL/L (ref 1.13–1.33)
CALCIUM SERPL-MCNC: 8.5 MG/DL (ref 8.6–10.4)
CHLORIDE BLD-SCNC: 104 MMOL/L (ref 98–107)
CO2: 24 MMOL/L (ref 20–31)
CREAT SERPL-MCNC: 0.91 MG/DL (ref 0.7–1.2)
GFR AFRICAN AMERICAN: >60 ML/MIN
GFR NON-AFRICAN AMERICAN: >60 ML/MIN
GFR SERPL CREATININE-BSD FRML MDRD: ABNORMAL ML/MIN/{1.73_M2}
GFR SERPL CREATININE-BSD FRML MDRD: ABNORMAL ML/MIN/{1.73_M2}
GLUCOSE BLD-MCNC: 95 MG/DL (ref 70–99)
HCT VFR BLD CALC: 44.1 % (ref 40.7–50.3)
HEMOGLOBIN: 14 G/DL (ref 13–17)
MCH RBC QN AUTO: 31.3 PG (ref 25.2–33.5)
MCHC RBC AUTO-ENTMCNC: 31.7 G/DL (ref 28.4–34.8)
MCV RBC AUTO: 98.7 FL (ref 82.6–102.9)
NRBC AUTOMATED: 0 PER 100 WBC
PDW BLD-RTO: 13.2 % (ref 11.8–14.4)
PHOSPHORUS: 2.7 MG/DL (ref 2.5–4.5)
PLATELET # BLD: NORMAL K/UL (ref 138–453)
PLATELET, FLUORESCENCE: 141 K/UL (ref 138–453)
PLATELET, IMMATURE FRACTION: 1.4 % (ref 1.1–10.3)
PMV BLD AUTO: NORMAL FL (ref 8.1–13.5)
POTASSIUM SERPL-SCNC: 4.2 MMOL/L (ref 3.7–5.3)
RBC # BLD: 4.47 M/UL (ref 4.21–5.77)
SODIUM BLD-SCNC: 136 MMOL/L (ref 135–144)
WBC # BLD: 4.7 K/UL (ref 3.5–11.3)

## 2020-12-28 PROCEDURE — 97116 GAIT TRAINING THERAPY: CPT

## 2020-12-28 PROCEDURE — 92507 TX SP LANG VOICE COMM INDIV: CPT

## 2020-12-28 PROCEDURE — 85027 COMPLETE CBC AUTOMATED: CPT

## 2020-12-28 PROCEDURE — 99239 HOSP IP/OBS DSCHRG MGMT >30: CPT | Performed by: STUDENT IN AN ORGANIZED HEALTH CARE EDUCATION/TRAINING PROGRAM

## 2020-12-28 PROCEDURE — 85055 RETICULATED PLATELET ASSAY: CPT

## 2020-12-28 PROCEDURE — 97530 THERAPEUTIC ACTIVITIES: CPT

## 2020-12-28 PROCEDURE — 6360000002 HC RX W HCPCS: Performed by: STUDENT IN AN ORGANIZED HEALTH CARE EDUCATION/TRAINING PROGRAM

## 2020-12-28 PROCEDURE — 82330 ASSAY OF CALCIUM: CPT

## 2020-12-28 PROCEDURE — 97535 SELF CARE MNGMENT TRAINING: CPT

## 2020-12-28 PROCEDURE — 6370000000 HC RX 637 (ALT 250 FOR IP): Performed by: PHYSICIAN ASSISTANT

## 2020-12-28 PROCEDURE — 80048 BASIC METABOLIC PNL TOTAL CA: CPT

## 2020-12-28 PROCEDURE — 6370000000 HC RX 637 (ALT 250 FOR IP): Performed by: NURSE PRACTITIONER

## 2020-12-28 PROCEDURE — 99232 SBSQ HOSP IP/OBS MODERATE 35: CPT | Performed by: INTERNAL MEDICINE

## 2020-12-28 PROCEDURE — 6370000000 HC RX 637 (ALT 250 FOR IP): Performed by: PSYCHIATRY & NEUROLOGY

## 2020-12-28 PROCEDURE — 84100 ASSAY OF PHOSPHORUS: CPT

## 2020-12-28 PROCEDURE — 97110 THERAPEUTIC EXERCISES: CPT

## 2020-12-28 PROCEDURE — 2580000003 HC RX 258: Performed by: PHYSICIAN ASSISTANT

## 2020-12-28 PROCEDURE — 36415 COLL VENOUS BLD VENIPUNCTURE: CPT

## 2020-12-28 RX ORDER — ATORVASTATIN CALCIUM 40 MG/1
40 TABLET, FILM COATED ORAL NIGHTLY
Qty: 30 TABLET | Refills: 3 | Status: SHIPPED | OUTPATIENT
Start: 2020-12-28

## 2020-12-28 RX ORDER — DIVALPROEX SODIUM 500 MG/1
500 TABLET, EXTENDED RELEASE ORAL 2 TIMES DAILY
Qty: 30 TABLET | Refills: 3 | Status: SHIPPED | OUTPATIENT
Start: 2020-12-28

## 2020-12-28 RX ORDER — CARVEDILOL 3.12 MG/1
3.12 TABLET ORAL 2 TIMES DAILY WITH MEALS
Qty: 60 TABLET | Refills: 3 | Status: SHIPPED | OUTPATIENT
Start: 2020-12-28

## 2020-12-28 RX ORDER — VENLAFAXINE 37.5 MG/1
37.5 TABLET ORAL DAILY
Qty: 90 TABLET | Refills: 3 | Status: SHIPPED | OUTPATIENT
Start: 2020-12-28

## 2020-12-28 RX ADMIN — DIVALPROEX SODIUM 500 MG: 500 TABLET, EXTENDED RELEASE ORAL at 09:05

## 2020-12-28 RX ADMIN — VENLAFAXINE 37.5 MG: 37.5 TABLET ORAL at 09:05

## 2020-12-28 RX ADMIN — SODIUM CHLORIDE, PRESERVATIVE FREE 10 ML: 5 INJECTION INTRAVENOUS at 09:07

## 2020-12-28 RX ADMIN — STANDARDIZED SENNA CONCENTRATE AND DOCUSATE SODIUM 1 TABLET: 8.6; 5 TABLET ORAL at 09:05

## 2020-12-28 RX ADMIN — ENOXAPARIN SODIUM 30 MG: 100 INJECTION SUBCUTANEOUS at 09:04

## 2020-12-28 RX ADMIN — CARVEDILOL 3.12 MG: 3.12 TABLET, FILM COATED ORAL at 09:05

## 2020-12-28 RX ADMIN — FAMOTIDINE 20 MG: 20 TABLET ORAL at 09:05

## 2020-12-28 ASSESSMENT — PAIN SCALES - GENERAL: PAINLEVEL_OUTOF10: 0

## 2020-12-28 NOTE — CARE COORDINATION
Spoke to patient regarding [facility] being out of network and requiring 50% of bill will be out of pocket. He has agreed to go ahead with the discharge to [facility]. Called daughter Sb Ivan left voicemail to update her.   Called Rosio Alan (daughter)  Updated her on transfer today to [place].

## 2020-12-28 NOTE — PROGRESS NOTES
Speech Language Pathology  Speech Language Pathology  [address] [facility]    Speech Language Treatment Note    Date: 12/28/2020  Patients Name: Sandi Silveira  MRN: [id number]  Diagnosis:   Patient Active Problem List   Diagnosis Code    Brain mass G93.89    Acute encephalopathy G93.40    Cerebral edema (HCC) G93.6    Thrombocytopenia (HCC) D69.6    GBM (glioblastoma multiforme) ([facility] [address] C71.9       Pain: 0/10    Speech and Language Treatment  Treatment time: 922-934    Subjective: [x] Alert [x] Cooperative     [] Confused     [] Agitated    [] Lethargic    Objective/Assessment:  Auditory Comprehension:  Mod complex Yes/No: 75% independently increased to 100% with repetition     Verbal Expression:   What Questions: 2/5 increased to 5/5 with max verbal, visual, and part-word cues. Where Questions: 1/5 increased to 3/5 with forced choice cues    Picture ID: 1/4 increased to 3/4 with max verbal, visual, and part-word cues     Other: Pt pleasant and cooperative throughout session. Continues to report that \"he knows the words they just won't come out. \" ST reviewed previous education re: word finding deficits with good return. Plan:  [x] Continue  services    [] Discharge from :      Discharge recommendations: Further therapy recommended at discharge. Treatment completed by:  Jinny Cole M.S. CF-SLP

## 2020-12-28 NOTE — PROGRESS NOTES
[facility] Neurology   [address] [place] [address]    Progress note             Date:   12/28/2020  Patient name:  Denice Marc  Date of admission:  12/9/2020  1:32 PM  MRN:   [id number]  Account:  [de-identified]  YOB: 1945  PCP:    No primary care provider on file. Room:   [address] [place] [address]  Code Status:    DNR-CCA    Chief Complaint:     Chief Complaint   Patient presents with    Cerebrovascular Accident   S/p resection of temporal mass    Interval hx:     No acute events overnight, patient's speech has significantly improved over the past week. Is awake and able to follow commands appropriately. He remains on Depakote and we are awaiting for authorization for discharge. He will be being receiving evaluation with radiation oncology as outpatient, patient will follow up with oncology as outpatient. Brief History of Present Illness:       64-year-old male who initially presented for altered mental status after nursing the Facebook posts were not making sense at home.  Following day, the patient's mental status declined and he became less responsive.  Brought to the hospital with an NIH stroke scale of 20, aphasia.  Patient was found to have a left posterior temporal lobe mass with vasogenic edema.  He underwent resection of underlying mass. This was consistent with GBM, hematology is currently on board. Past Medical History:     History reviewed. No pertinent past medical history. Past Surgical History:     Past Surgical History:   Procedure Laterality Date    CRANIOTOMY Left 12/14/2020    temporal for tumor    CRANIOTOMY Left 12/14/2020    TEMPORAL CRANIOTOMY FOR TUMOR, IMAGE GUIDED, AWAKE performed by Juana Simpson DO at [facility]        Medications Prior to Admission:     Prior to Admission medications    Not on File        Allergies:     Patient has no known allergies.     Social History:     Tobacco:    has no history on file for tobacco. Alcohol:      has no history on file for alcohol. Drug Use:  has no history on file for drug. Family History:     History reviewed. No pertinent family history. Review of Systems:     ROS:  Constitutional  Negative for fever and chills    HEENT  Negative for ear discharge, ear pain, nosebleed    Eyes  Negative for photophobia, pain and discharge    Respiratory  Negative for hemoptysis and sputum    Cardiovascular  Negative for orthopnea, claudication and PND    Gastrointestinal  Negative for abdominal pain, diarrhea, blood in stool    Musculoskeletal  Negative for joint pain, negative for myalgia    Neurology Negative for seizures, loss of consciousness   Skin  Negative for rash or itching    Endo/heme/allergies  Negative for polydipsia, environmental allergy    Psychiatric/behavioral  Negative for suicidal ideation. Patient is not anxious        Physical Exam:   /68   Pulse 71   Temp 97.8 °F (36.6 °C) (Oral)   Resp 15   Ht 6' 2\" (1.88 m)   Wt 192 lb (87.1 kg)   SpO2 98%   BMI 24.65 kg/m²   Temp (24hrs), Av.9 °F (36.6 °C), Min:97.6 °F (36.4 °C), Max:98.1 °F (36.7 °C)    No results for input(s): POCGLU in the last 72 hours.     Intake/Output Summary (Last 24 hours) at 2020 0953  Last data filed at 2020 0451  Gross per 24 hour   Intake    Output 550 ml   Net -550 ml         NEUROLOGIC EXAMINATION  GENERAL  Appears comfortable and in no distress   HEENT  NC/ AT   NECK  Supple and no bruits heard   MENTAL STATUS:  Alert, oriented x1, impaired memory, expressive aphasia word finding difficulties   CRANIAL NERVES: II     -      Visual fields intact to confrontation  III,IV,VI -  EOMs full, no afferent defect, no NEW, no ptosis  V     -     Normal facial sensation  VII    -     Normal facial symmetry  VIII   -     Intact hearing  IX,X -     Symmetrical palate  XI    -     Symmetrical shoulder shrug  XII   -     Midline tongue, no atrophy MOTOR FUNCTION:  significant for good strength of grade 5/5 in bilateral proximal and distal muscle groups of both upper and lower extremities with normal bulk, normal tone and no involuntary movements, no tremor   SENSORY FUNCTION:  Normal touch, normal pin, normal vibration, normal proprioception   CEREBELLAR FUNCTION:  Intact fine motor control over upper limbs   REFLEX FUNCTION:  Symmetric, no perverted reflex, no Babinski sign   STATION and GAIT  Not tested       Investigations:      Laboratory Testing:  Recent Results (from the past 24 hour(s))   Basic Metabolic Panel    Collection Time: 12/28/20  5:12 AM   Result Value Ref Range    Glucose 95 70 - 99 mg/dL    BUN 16 8 - 23 mg/dL    CREATININE 0.91 0.70 - 1.20 mg/dL    Bun/Cre Ratio NOT REPORTED 9 - 20    Calcium 8.5 (L) 8.6 - 10.4 mg/dL    Sodium 136 135 - 144 mmol/L    Potassium 4.2 3.7 - 5.3 mmol/L    Chloride 104 98 - 107 mmol/L    CO2 24 20 - 31 mmol/L    Anion Gap 8 (L) 9 - 17 mmol/L    GFR Non-African American >60 >60 mL/min    GFR African American >60 >60 mL/min    GFR Comment          GFR Staging NOT REPORTED    CBC    Collection Time: 12/28/20  5:12 AM   Result Value Ref Range    WBC 4.7 3.5 - 11.3 k/uL    RBC 4.47 4.21 - 5.77 m/uL    Hemoglobin 14.0 13.0 - 17.0 g/dL    Hematocrit 44.1 40.7 - 50.3 %    MCV 98.7 82.6 - 102.9 fL    MCH 31.3 25.2 - 33.5 pg    MCHC 31.7 28.4 - 34.8 g/dL    RDW 13.2 11.8 - 14.4 %    Platelets See Reflexed IPF Result 138 - 453 k/uL    MPV NOT REPORTED 8.1 - 13.5 fL    NRBC Automated 0.0 0.0 per 100 WBC   PHOSPHORUS    Collection Time: 12/28/20  5:12 AM   Result Value Ref Range    Phosphorus 2.7 2.5 - 4.5 mg/dL   CALCIUM, IONIZED    Collection Time: 12/28/20  5:12 AM   Result Value Ref Range    Calcium, Ion 1.06 (L) 1.13 - 1.33 mmol/L   Immature Platelet Fraction    Collection Time: 12/28/20  5:12 AM   Result Value Ref Range    Platelet, Immature Fraction 1.4 1.1 - 10.3 %    Platelet, Fluorescence 141 138 - 453 k/uL Assessment :      Primary Problem  GBM s/p resection    Active Hospital Problems    Diagnosis Date Noted    Thrombocytopenia ([facility] [address] [D69.6]     GBM (glioblastoma multiforme) ([facility] [address] [C71.9]     Acute encephalopathy [G93.40]     Cerebral edema ([facility] [address] [G93.6]     Brain mass [G93.89] 12/09/2020       Plan:     64-year-old gentleman who is status post resection of temporal mass consistent with GBM. Patient has been neurologically stable, his dysarthria/expressive aphasia has significantly improved over the past week. He is awake alert and able to follow commands. Patient remains on Depakote, he will follow up with heme oncology and radiation oncology upon discharge. NeurologicalGBM status post resection. Awaiting authorization for probable discharge. Continue on Depakote at current dose. Seizure precautions as directed. Heme oncology follow-up as outpatient. Radiation oncology follow-up as outpatient. Neurosurgery and neurology follow-up as outpatient. Continue statin therapy. DVT prophylaxis. Follow-up further recommendations after discussing the case with attending  The plan was discussed with the patient, patient's family and the medical staff. Consultations:   IP CONSULT TO STROKE TEAM  IP CONSULT TO NEUROSURGERY  IP CONSULT TO NEUROCRITICAL CARE  IP CONSULT TO PHYSICAL MEDICINE REHAB  IP CONSULT TO CARDIOLOGY  IP CONSULT TO PHYSICAL MEDICINE REHAB  IP CONSULT TO HEM/ONC  IP CONSULT TO HEM/ONC  IP CONSULT TO NEUROLOGY  IP CONSULT TO PALLIATIVE CARE  IP CONSULT TO ETHICS    Patient is admitted as inpatient status because of co-morbidities listed above, severity of signs and symptoms as outlined, requirement for current medical therapies and most importantly because of direct risk to patient if care not provided in a hospital setting. Mina Hay MD  12/28/2020  9:53 AM    Copy sent to Dr. Gayle primary care provider on file.

## 2020-12-28 NOTE — PROGRESS NOTES
Today's Date: 12/28/2020  Patient Name: Ariane Perez  Date of admission: 12/9/2020  1:32 PM  Patient's age: 66 y.o., 1945  Admission Dx: Brain mass [G93.89]    Reason for Consult: management recommendations for left brain mass  Requesting Physician: Elvia Boone MD    CHIEF COMPLAINT: Altered mental status    SUBJECTIVE:  Patient was seen and examined. Labs and vitals reviewed  Patient resting comfortably  Denies new complaint or interval event. Anticipating discharge once accepted to the rehab. BRIEF CASE HISTORY:    The patient is a 66 y.o.  male who is admitted to the hospital for acute encephalopathy and left brain mass. Patient presented with confusion and strokelike symptoms about a week ago. Found to have a left temporal lobe mass with cerebral edema and midline shift on brain imaging. He was managing neuro ICU and received steroids and antiseizure medications. He underwent craniotomy with resection of left temporal lobe tumor on 12/14/2020. Pathology report on sample obtained showed grade 4 glioblastoma multiforme. His past medical history includes essential hypertension and hyperlipidemia. He lost his wife to cancer. His 2 daughters are his designated health power of . Patient is pleasantly confused and is very hard of hearing. He appears to understand simple commands and conversations. He is currently on the neuro stepdown floor. Past Medical History:   has no past medical history on file. Past Surgical History:   has a past surgical history that includes craniotomy (Left, 12/14/2020) and craniotomy (Left, 12/14/2020). Medications:    Reviewed in Epic     Allergies:  Patient has no known allergies. Social History:        Family History: family history is not on file. Family history was reviewed and no pertinent family history noted. REVIEW OF SYSTEMS: Limited due to patient's altered mental status  Constitutional: No fever or chills. Eyes: No eye discharge, double vision, or eye pain   HEENT: negative for sore mouth, sore throat, hoarseness and voice change   Respiratory: negative for cough , sputum, dyspnea, wheezing, hemoptysis, chest pain   Cardiovascular: negative for chest pain, dyspnea,  Gastrointestinal: negative for nausea, vomiting, diarrhea, constipation, abdominal pain, dysphagia  Genitourinary: negative for frequency, dysuria, nocturia, urinary incontinence, and hematuria   Integument: negative for rash   Hematologic/Lymphatic: negative for easy bruising, bleeding  Musculoskeletal: negative for myalgias, arthralgias, pain  Neurological: Positive for weakness, negative for headaches, dizziness    PHYSICAL EXAM:      /62   Pulse 65   Temp 97.6 °F (36.4 °C) (Oral)   Resp 19   Ht 6' 2\" (1.88 m)   Wt 192 lb (87.1 kg)   SpO2 97%   BMI 24.65 kg/m²    Temp (24hrs), Av.9 °F (36.6 °C), Min:97.6 °F (36.4 °C), Max:98.1 °F (36.7 °C)    General appearance - well appearing, not in pain or distress   Mental status - alert and cooperative   Eyes - pupils equal and reactive, extraocular eye movements intact   Ears -not examined  Mouth - mucous membranes moist, pharynx normal without lesions   Neck - supple, no significant adenopathy   Lymphatics - no palpable lymphadenopathy, no hepatosplenomegaly   Chest - clear to auscultation, no wheezes, rales or rhonchi, symmetric air entry   Heart - normal rate, regular rhythm, normal S1, S2, no murmurs  Abdomen - soft, nontender, nondistended, no masses or organomegaly   Neurological - alert, not oriented, distorted speech  Musculoskeletal - no joint tenderness, deformity or swelling   Extremities - peripheral pulses normal, no pedal edema, no clubbing or cyanosis   Skin - normal coloration and turgor, no rashes, no suspicious skin lesions noted ,    DATA:    Labs:   CBC:   Recent Labs     20  0728 20  0512   WBC 4.8 4.7   HGB 14.6 14.0   HCT 44.3 44.1 PLT See Reflexed IPF Result See Reflexed IPF Result     BMP:   Recent Labs     12/27/20  0728 12/28/20  0512    136   K 4.1 4.2   CO2 22 24   BUN 20 16   CREATININE 0.76 0.91   LABGLOM >60 >60   GLUCOSE 91 95     PT/INR: No results for input(s): PROTIME, INR in the last 72 hours. IMAGING DATA:      Primary Problem  <principal problem not specified>    Active Hospital Problems    Diagnosis Date Noted    Thrombocytopenia ([facility] [address] [D69.6]     GBM (glioblastoma multiforme) (HCC) [C71.9]     Acute encephalopathy [G93.40]     Cerebral edema ([address] [G93.6]     Brain mass [G93.89] 12/09/2020       IMPRESSION:   1. Glioblastoma multiforme. High-grade. 2. Cerebral edema. 3. Essential hypertension. 4. Mild thrombocytopenia. RECOMMENDATIONS:  Records, labs and imaging reviewed. S/p resection of GBM patient recovering well  Awaiting discharge to rehab  Family at this time wants to proceed with treatment as an outpatient. We will set up outpatient follow-up appointment  Okay to discharge from medical oncology standpoint. [facility]        This note is created with the assistance of a speech recognition program.  While intending to generate a document that actually reflects the content of the visit, the document can still have some errors including those of syntax and sound a like substitutions which may escape proof reading. It such instances, actual meaning can be extrapolated by contextual diversion.

## 2020-12-28 NOTE — DISCHARGE SUMMARY
[facility]     Department of Neurology    INPATIENT DISCHARGE SUMMARY        Patient Identification:  Lola Underwood is a 66 y.o. male. :  1945  MRN: [id number]     Acct: [de-identified]   Admit Date:  2020  Discharge date and time: No discharge date for patient encounter. Attending Provider: Kourtney Reyes MD                                     Admission Diagnoses:   Brain mass [G93.89]    Discharge Diagnoses: Active Problems:    Brain mass    Acute encephalopathy    Cerebral edema (HCC)    Thrombocytopenia (HCC)    GBM (glioblastoma multiforme) (HCC)  Resolved Problems:    * No resolved hospital problems. *       Consults:   Heme oncology    Brief Inpatient course:  78-year-old male who initially presented with altered mental status. His daughter became concerned due to impaired speech and periods of being mute. Patient was found to be more lethargic and he was transported by [employer] for concern of stroke. [facility] was between 810 on presentation. Patient was found to have a left posterior temporal lobe mass with surrounding vasogenic edema. There was also concern for possible underlying seizures and patient was loaded with Keppra. Patient was started on Decadron which was subsequently tapered and discontinued. Hematology was consulted and recommended after acute rehab to begin initial treatment. Patient was also recommended to follow-up with radiation oncology as outpatient.

## 2020-12-28 NOTE — PROGRESS NOTES
Occupational Therapy  Facility/Department: [facility] NEURO  Daily Treatment Note  NAME: Megan Lovell  : 1945  MRN: [id number]    Date of Service: 2020    Discharge Recommendations:  Patient would benefit from continued therapy after discharge   Ed on OT services, transfer safety, walker safety/technique, ADLs, orientation review- good return       Assessment   Performance deficits / Impairments: Decreased functional mobility ; Decreased ADL status; Decreased strength;Decreased safe awareness;Decreased cognition;Decreased endurance;Decreased balance;Decreased high-level IADLs  Treatment Diagnosis: stroke like symptoms  Prognosis: Good  REQUIRES OT FOLLOW UP: Yes  Activity Tolerance  Activity Tolerance: Patient Tolerated treatment well  Safety Devices  Safety Devices in place: Yes  Type of devices: All fall risk precautions in place;Call light within reach; Chair alarm in place; Left in chair;Nurse notified         Patient Diagnosis(es): The primary encounter diagnosis was Brain mass. A diagnosis of [place] coma scale total score 3-8, in the field (EMT or ambulance) [facility]) was also pertinent to this visit. has no past medical history on file. has a past surgical history that includes craniotomy (Left, 2020) and craniotomy (Left, 2020). Restrictions  Restrictions/Precautions  Restrictions/Precautions: General Precautions, Surgical Protocols, Fall Risk, Up as Tolerated  Required Braces or Orthoses?: No  Position Activity Restriction  Other position/activity restrictions: Up with assist  Subjective   Vital Signs  Patient Currently in Pain: No   Orientation  Orientation  Overall Orientation Status: Within Functional Limits(however at times during the session pt verbalized having trouble remembering things now- emotional support given)  Objective    Pt in bed awake and alert upon arrival. Pt pt pleasant and motivated to participate in therapy.     ADL Grooming: Setup;Contact guard assistance; Increased time to complete(stood at sink)  UE Bathing: Contact guard assistance;Verbal cueing; Increased time to complete(stood at sink)  UE Dressing: Setup;Minimal assistance  LE Dressing: Stand by assistance(seated on EOB)  Toileting: Setup; Increased time to complete;Stand by assistance(SBA for toilet hygiene)  Additional Comments: pt demo functional transfers using SW on this date. Pt stood at sink after toileting to complete ADLs before retiring to recliner. Balance  Sitting Balance: Stand by assistance(seated on EOB, toilet)  Standing Balance: Contact guard assistance(w/SW)  Standing Balance  Time: stood ~ 15 min to complete ADLs and functional transfers in room  Toilet Transfers  Toilet - Technique: Ambulating(bed to bathroom transfer w/ SW )  Equipment Used: Raised toilet seat with rails  Toilet Transfer: Minimal assistance(pt used toilet grab bars to assist in transfers)  Toilet Transfers Comments: pt req increased assist from low toilet  Bed mobility  Rolling to Left: Stand by assistance  Rolling to Right: Stand by assistance  Supine to Sit: Stand by assistance  Sit to Supine: Stand by assistance  Scooting: Stand by assistance  Comment: pt used bed rails to assist and HOB slightly elevated  Transfers  Stand Step Transfers: Contact guard assistance(w/SW)  Stand Pivot Transfers: Contact guard assistance  Sit to stand: Minimal assistance  Stand to sit: Minimal assistance  Transfer Comments: verbal cues to use walker properly -good return  Attendance  Participation: Active participation      Pt slow to process information and at times required verbal/tactile cues to initiate- good return    Arousal/Alertness: Appropriate responses to stimuli  Following Commands: Follows multistep commands with repitition; Follows multistep commands with increased time  Attention Span: Attends with cues to redirect  Memory: Decreased short term memory;Decreased recall of precautions

## 2020-12-28 NOTE — PROGRESS NOTES
Physical Therapy  Facility/Department: [facility] NEURO  Daily Treatment Note  NAME: Stacy Reynaga  : 1945  MRN: [id number]    Date of Service: 2020    Discharge Recommendations:  Patient would benefit from continued therapy after discharge   PT Equipment Recommendations  Other: CTA    Assessment   Assessment: Pt amb ~150 ft with RW and CGA. Would benefit from continued PT after d/c. Prognosis: Good  REQUIRES PT FOLLOW UP: Yes  Activity Tolerance  Activity Tolerance: Patient limited by fatigue;Patient limited by endurance; Patient Tolerated treatment well     Patient Diagnosis(es): The primary encounter diagnosis was Brain mass. A diagnosis of [place] coma scale total score 3-8, in the field (EMT or ambulance) [facility]) was also pertinent to this visit. has no past medical history on file. has a past surgical history that includes craniotomy (Left, 2020) and craniotomy (Left, 2020). Restrictions  Restrictions/Precautions  Restrictions/Precautions: General Precautions, Surgical Protocols, Fall Risk, Up as Tolerated  Required Braces or Orthoses?: No  Position Activity Restriction  Other position/activity restrictions: Up with assist  Subjective   General  Chart Reviewed: Yes  Response To Previous Treatment: Patient with no complaints from previous session. Family / Caregiver Present: No  Subjective  Subjective: RN and pt agreeable to PT. Pt resting in chair upon arrival with no c/o pain.   Pain Screening  Patient Currently in Pain: No  Vital Signs  Patient Currently in Pain: No       Orientation  Orientation  Overall Orientation Status: Within Functional Limits  Cognition      Objective   Bed mobility  Comment: pt in chair upon arrival  Transfers  Sit to Stand: Contact guard assistance  Stand to sit: Contact guard assistance  Ambulation  Ambulation?: Yes  Ambulation 1  Surface: level tile  Device: Rolling Walker  Assistance: Minimal assistance Quality of Gait: decreased lux, flexed posture, no LOB  Gait Deviations: Slow Lux;Decreased step length;Decreased step height  Distance: ~150'  Comments: increased time and effort to complete due to SOB and increased fatigue. cueing for sequencing t/o fair return. Stairs/Curb  Stairs?: No     Balance  Posture: Good  Sitting - Static: Good  Sitting - Dynamic: Good  Standing - Static: Fair;+  Standing - Dynamic: Fair  Comments: Standing balance assessed w/ RW;  Exercises  Seated LE exercise program: Long Arc Quads, hip abduction/adduction, heel/toe raises, and marches. Reps: x15  Standing exercise program: marches, heel raises. Reps: x10 with RW  Goals  Short term goals  Time Frame for Short term goals: 14  Short term goal 1: Pt to perform bed mobility CGA  Short term goal 2: Pt to demonstrate functional transfers CGA  Short term goal 3: Ambulate 100ft w/ least restrictive AD CGA  Short term goal 4: Demonstrate standing balance of good - to decrease fall risk  Patient Goals   Patient goals :  To go home    Plan    Plan  Times per week: 5-6x/week  Current Treatment Recommendations: Strengthening, Transfer Training, Endurance Training, Patient/Caregiver Education & Training, ROM, Balance Training, Gait Training, Home Exercise Program, Functional Mobility Training, Stair training, Safety Education & Training  Safety Devices  Type of devices: Gait belt, Nurse notified, Call light within reach, Patient at risk for falls, All fall risk precautions in place  Restraints  Initially in place: No     Therapy Time   Individual Concurrent Group Co-treatment   Time In 0952         Time Out 1030         Minutes 38         Timed Code Treatment Minutes: 109 [facility]

## 2020-12-28 NOTE — PLAN OF CARE
Problem: Falls - Risk of:  Goal: Will remain free from falls  Description: Will remain free from falls  12/27/2020 2346 by Vamshi Whitley RN  Outcome: Ongoing  12/27/2020 2338 by Vamshi Whitley RN  Outcome: Ongoing  Goal: Absence of physical injury  Description: Absence of physical injury  12/27/2020 2346 by Vamshi Whitley RN  Outcome: Ongoing  12/27/2020 2338 by Vamshi Whitley RN  Outcome: Ongoing     Problem: Skin Integrity:  Goal: Will show no infection signs and symptoms  Description: Will show no infection signs and symptoms  12/27/2020 2346 by Vamshi Whitley RN  Outcome: Ongoing  12/27/2020 2338 by Vamshi Whitley RN  Outcome: Ongoing  Goal: Absence of new skin breakdown  Description: Absence of new skin breakdown  12/27/2020 2346 by Vamshi Whitley RN  Outcome: Ongoing  12/27/2020 2338 by Vamshi Whitley RN  Outcome: Ongoing

## 2020-12-31 ENCOUNTER — TELEPHONE (OUTPATIENT)
Dept: RADIATION ONCOLOGY | Facility: MEDICAL CENTER | Age: 75
End: 2020-12-31

## 2020-12-31 NOTE — TELEPHONE ENCOUNTER
Received call from [facility] [phone]. Patient was discharged from [facility] and on discharge summary it states he needs follow up with Radiation.  Appointment scheduled on 1/28/2020

## 2021-01-04 ENCOUNTER — TELEPHONE (OUTPATIENT)
Dept: ONCOLOGY | Age: 76
End: 2021-01-04

## 2021-01-04 NOTE — TELEPHONE ENCOUNTER
Dr. Nancy Ibanez alerted writer to pt's case & requested oncology navigation. Upon review of chart noted pt dcd to [facility], [phone]. Spoke with Cherri, pt's nurse, inquired on pt & d/c planning. Cherri stated unsure of d/c planning & pt doing well. Upon review of chart noted pt scheduled for RO consult 2021 @ Brandon Ibanez f/u 2021 @ [facility]. Noted pt's craniotomy/bx completed 2020. Hien Kelly will contact Dr. Nancy Ibanez r/t expediting MO/RO appts. Spoke with Dr. Nancy Ibanez updated on findings. Dr. Nancy Ibanez stated pt to be scheduled with consulting MO, Dr. Jayson Alvarez, & requested appts expedited. Name: Ne Calle  : 1945  MRN: [id number]    Oncology Navigation- Initial Note:    Intake-  Contact Type: Telephone  Notes: Introductory phone call made to patient's daughter, Naomi, instructed Carmen Soliman will be navigating oncology care &  may call writer with any questions/concerns @ [phone] prn. Discussed potential barriers to care, Naomi verbalized financial concerns r/t pt on fixed income. Instructed Carmen Soliman will contact [facility] , & request contact Naomi. Per Naomi pt's daughter, Valeriy Delacruz with his fiances\" & requested [employer]. Discussed community resources with [facility], offered referral to Jes lora @ this time. Naomi stated unsure d/c plans from rehab @ this time. Naomi stated willing to move in with pt if pt allows. Naomi stated recently dx with covid 19 & pt verbalized concerns being around Naomi. Notified [facility] pt scheduled incorrectly with Dr. Nancy Ibanez & pt to be seen by MO/RO sooner than previously scheduled. Discussed [facility]. Naomi requested dual MO/RO appts @ [facility]. Spoke with Gewndolyn Mccray [facility] , updated on pt, requested previous MO/RO appts canceled, & dual Dr. Gemini Gonzalez appts scheduled.   Pt scheduled 1/15/2021 @ [facility] to arrive @ 0800 for RO nurse, 0830 Dr. Topher Selby consult, Bernadette Gonzalez post hospital f/u. Spoke with Yanni Miranda, updated on canceled appts & dual RO/MO 1/15/2021 appt details. Yanni Miranda verbalized understanding & stated will arrange for transportation. Shahab Ventura updated on 1/15/2021 appt details. Shahab Ventura verbalized understanding & thanked writer. Attempted to contact Damian Tinsley, no answer, ARLIN left updated on pt. [address] written materials along with writer's business card mailed to patient. Will continue to follow.     Electronically signed by Kat Zavala RN on 1/4/2021 at 4:24 PM

## 2021-01-05 ENCOUNTER — OUTSIDE SERVICES (OUTPATIENT)
Dept: FAMILY MEDICINE CLINIC | Age: 76
End: 2021-01-05
Payer: MEDICARE

## 2021-01-05 DIAGNOSIS — E78.49 OTHER HYPERLIPIDEMIA: ICD-10-CM

## 2021-01-05 DIAGNOSIS — M19.90 ARTHRITIS: ICD-10-CM

## 2021-01-05 DIAGNOSIS — K21.9 GASTROESOPHAGEAL REFLUX DISEASE WITHOUT ESOPHAGITIS: ICD-10-CM

## 2021-01-05 DIAGNOSIS — R73.01 IFG (IMPAIRED FASTING GLUCOSE): ICD-10-CM

## 2021-01-05 DIAGNOSIS — R53.83 OTHER FATIGUE: ICD-10-CM

## 2021-01-05 DIAGNOSIS — R47.01 APHASIA: ICD-10-CM

## 2021-01-05 DIAGNOSIS — E78.5 HYPERLIPIDEMIA, UNSPECIFIED HYPERLIPIDEMIA TYPE: ICD-10-CM

## 2021-01-05 DIAGNOSIS — G61.9 INFLAMMATORY NEUROPATHY (HCC): ICD-10-CM

## 2021-01-05 DIAGNOSIS — K59.00 CONSTIPATION, UNSPECIFIED CONSTIPATION TYPE: ICD-10-CM

## 2021-01-05 DIAGNOSIS — M62.81 MUSCLE WEAKNESS (GENERALIZED): ICD-10-CM

## 2021-01-05 DIAGNOSIS — F41.9 ANXIETY: ICD-10-CM

## 2021-01-05 DIAGNOSIS — R26.81 UNSTEADINESS ON FEET: ICD-10-CM

## 2021-01-05 DIAGNOSIS — R56.9 SEIZURES (HCC): ICD-10-CM

## 2021-01-05 DIAGNOSIS — N40.1 BENIGN PROSTATIC HYPERPLASIA WITH URINARY OBSTRUCTION: ICD-10-CM

## 2021-01-05 DIAGNOSIS — G60.3 IDIOPATHIC PROGRESSIVE NEUROPATHY: ICD-10-CM

## 2021-01-05 DIAGNOSIS — D69.6 THROMBOCYTOPENIA (HCC): ICD-10-CM

## 2021-01-05 DIAGNOSIS — I10 ESSENTIAL HYPERTENSION: ICD-10-CM

## 2021-01-05 DIAGNOSIS — N13.8 BENIGN PROSTATIC HYPERPLASIA WITH URINARY OBSTRUCTION: ICD-10-CM

## 2021-01-05 DIAGNOSIS — C71.2 MALIGNANT NEOPLASM OF TEMPORAL LOBE (HCC): ICD-10-CM

## 2021-01-05 PROBLEM — G62.9 PERIPHERAL NEUROPATHY: Status: ACTIVE | Noted: 2021-01-05

## 2021-01-05 PROCEDURE — 99305 1ST NF CARE MODERATE MDM 35: CPT | Performed by: FAMILY MEDICINE

## 2021-01-05 RX ORDER — BISACODYL 10 MG
10 SUPPOSITORY, RECTAL RECTAL PRN
COMMUNITY

## 2021-01-05 RX ORDER — ACETAMINOPHEN 325 MG/1
650 TABLET ORAL EVERY 4 HOURS PRN
COMMUNITY

## 2021-01-05 RX ORDER — SENNA PLUS 8.6 MG/1
1 TABLET ORAL 2 TIMES DAILY
COMMUNITY

## 2021-01-06 ENCOUNTER — TELEPHONE (OUTPATIENT)
Dept: ONCOLOGY | Age: 76
End: 2021-01-06

## 2021-01-06 ENCOUNTER — OFFICE VISIT (OUTPATIENT)
Dept: NEUROSURGERY | Age: 76
End: 2021-01-06

## 2021-01-06 VITALS
SYSTOLIC BLOOD PRESSURE: 120 MMHG | WEIGHT: 192 LBS | DIASTOLIC BLOOD PRESSURE: 78 MMHG | HEART RATE: 74 BPM | BODY MASS INDEX: 24.65 KG/M2

## 2021-01-06 DIAGNOSIS — Z98.890 S/P CRANIOTOMY: ICD-10-CM

## 2021-01-06 DIAGNOSIS — C71.9 GBM (GLIOBLASTOMA MULTIFORME) (HCC): Primary | ICD-10-CM

## 2021-01-06 PROCEDURE — 99024 POSTOP FOLLOW-UP VISIT: CPT | Performed by: NURSE PRACTITIONER

## 2021-01-06 NOTE — PROGRESS NOTES
José Tompkins  [facility] [address] [facility] [address] [id number]  Dept: [phone]    Patient:  Adora Cranker  YOB: 1945  Date: 1/6/21    The patient is a 66 y.o. male who presents today for consult of the following problems:     Chief Complaint   Patient presents with    Post-Op Check     Brain mass         HPI:     Adora Cranker is a 66 y.o. male who presents to the office for post-op evaluation s/p craniotomy for tumor resection. Pathology positive for GBM WHO grade 4. Patient is a somewhat limited historian, does present to appointment alone. Some persistent confusion and short-term memory issues. Patient states that he is doing well. Denies any headaches. Incision is healing well. Does have some decrease sensation to bilateral lower extremities, states this is been present for many years, it is not new. Some worsened vision, states this is seemingly new. Does have appointment next week with medical and radiation oncology. Maintained on antiepileptic. Completed course of Decadron. Incision CDI, some adhesive still overlying incision  Sensation grossly intact  Bilateral upper extremity strength 5/5   Bilateral lower extremity strength 4/5-patient states this is chronic, unclear baseline  Perrl  Mild decrease to peripheral vision bilaterally    Date of surgery: 12/14/2020    Assessment and Plan:      1. GBM (glioblastoma multiforme) ([facility] [address]    2. S/P craniotomy          Plan: Patient has appropriate oncology follow-up next week, encouraged to maintain these appointments. Oncology nurse navigator following. Continue antiepileptic medications, follow-up with neurology as planned. Incision healing well. Repeat MRI in 1 to 2 weeks, postop appointment in approximately 4 weeks with Dr. Sherry Claire. Followup: Return in about 4 weeks (around 2/3/2021), or if symptoms worsen or fail to improve.     Prescriptions Ordered:  No orders of the defined types were placed in this encounter. Orders Placed:  Orders Placed This Encounter   Procedures    MRI BRAIN W WO CONTRAST     Standing Status:   Future     Standing Expiration Date:   1/6/2022     Order Specific Question:   Reason for exam:     Answer:   post-op GBM resection        Electronically signed by MING Rasmussen CNP on 1/6/2021 at 9:44 AM    Please note that this chart was generated using voice recognition Dragon dictation software. Although every effort was made to ensure the accuracy of this automated transcription, some errors in transcription may have occurred.

## 2021-01-06 NOTE — TELEPHONE ENCOUNTER
received referral from Nurse Navigator stating patient had concerns about medical bills and to contact his daughter, Robin Brown, who is his financial power of .  called Angela and went over Waleen.  sent paperwork to Robin Brown and encouraged her to call with any questions.

## 2021-01-08 NOTE — PROGRESS NOTES
[facility]  Date of Service:  1/5/2021  Elvis Guerra  Date of Birth 1945  MRN: [id number]  Code Status----[facility] -- Palliative care. HPI:  Pleasant elderly gentleman here after recent diagnosis of grade 4 glioblastoma multiforme on the left posterior temporal lobe. Patient initially presented with mental status changes near syncope or syncope. He was found to have a larger left posterior temporal brain mass with edema and slight shift. He was initially treated with steroids and subsequently consult with neurosurgery and underwent a craniotomy with left temporal lobe tumor resection December 14, 2020. He is being transferred here for rehab. Immunization History   Administered Date(s) Administered    Influenza A (H5N1) Monovalent Vaccine, Adjuvanted-2013 12/22/2009    Influenza Virus Vaccine 12/22/2009, 02/05/2016, 09/26/2017    Influenza, Quadv, Recombinant, IM PF (Flublok 18 yrs and older) 10/17/2018, 10/30/2019, 09/23/2020    Pneumococcal Conjugate 13-valent ([id number]) 02/05/2016    Pneumococcal Polysaccharide ([id number]) 12/22/2009    Zoster Live (Zostavax) 02/06/2016       No Known Allergies    Past Medical History:   Diagnosis Date    Aphasia 1/5/2021    Arthritis 1/5/2021    Benign prostatic hyperplasia with urinary obstruction 1/5/2021    Essential hypertension 1/5/2021    Gastroesophageal reflux disease without esophagitis 1/5/2021    Muscle weakness (generalized) 1/5/2021    Other hyperlipidemia 1/5/2021    Peripheral neuropathy 1/5/2021    Unsteadiness on feet 1/5/2021       Past Surgical History:   Procedure Laterality Date    CRANIOTOMY Left 12/14/2020    temporal for tumor    CRANIOTOMY Left 12/14/2020    TEMPORAL CRANIOTOMY FOR TUMOR, IMAGE GUIDED, AWAKE performed by Lorraine King DO at [address] History     Socioeconomic History    Marital status:       Spouse name: Not on file  Number of children: Not on file    Years of education: Not on file    Highest education level: Not on file   Occupational History    Not on file   Social Needs    Financial resource strain: Not on file    Food insecurity     Worry: Not on file     Inability: Not on file    Transportation needs     Medical: Not on file     Non-medical: Not on file   Tobacco Use    Smoking status: Former Smoker     Quit date:      Years since quittin.0    Smokeless tobacco: Never Used   Substance and Sexual Activity    Alcohol use: Not on file     Comment: SAUNDRA     Drug use: Not on file    Sexual activity: Not on file     Comment: SAUNDRA   Lifestyle    Physical activity     Days per week: Not on file     Minutes per session: Not on file    Stress: Not on file   Relationships    Social connections     Talks on phone: Not on file     Gets together: Not on file     Attends [employer] service: Not on file     Active member of club or organization: Not on file     Attends meetings of clubs or organizations: Not on file     Relationship status: Not on file    Intimate partner violence     Fear of current or ex partner: Not on file     Emotionally abused: Not on file     Physically abused: Not on file     Forced sexual activity: Not on file   Other Topics Concern    Not on file   Social History Narrative    Not on file       Current Outpatient Medications   Medication Sig Dispense Refill    senna (SENOKOT) 8.6 MG tablet Take 1 tablet by mouth 2 times daily      acetaminophen (TYLENOL) 325 MG tablet Take 650 mg by mouth every 4 hours as needed for Pain      magnesium hydroxide (MILK OF MAGNESIA) 400 MG/5ML suspension Take 30 mLs by mouth as needed for Constipation      bisacodyl (DULCOLAX) 10 MG suppository Place 10 mg rectally as needed for Constipation      carbamide peroxide (DEBROX) 6.5 % otic solution Place 5 drops into both ears as needed  divalproex (DEPAKOTE ER) 500 MG extended release tablet Take 1 tablet by mouth 2 times daily 30 tablet 3    venlafaxine (EFFEXOR) 37.5 MG tablet Take 1 tablet by mouth daily 90 tablet 3    atorvastatin (LIPITOR) 40 MG tablet Take 1 tablet by mouth nightly 30 tablet 3    carvedilol (COREG) 3.125 MG tablet Take 1 tablet by mouth 2 times daily (with meals) 60 tablet 3     No current facility-administered medications for this visit. REVIEW OF SYSTEMS:  He denies any headache. Minimal tenderness over his surgical site on the left temporal area. He denies any current bowel or bladder issues. He denies any sense of peripheral weakness or numbness on the right side. He reports that his speech is improving. He denies any fever or chills. PHYSICAL EXAM:  Vitals per nursing notes. Constitutional:   Pleasant age appropriate male. No apparent distress. HENT:   Neck supple. No lymphadenopathy. He is edentulous but does report that he has full dentures at home. EYES:  Pupils were equal, round, reactive to light. Extraocular muscles grossly intact. Cardiovascular:   Regular rate and rhythm. Pulmonary:   Lungs sound grossly clear. Abdominal:  Soft. Nontender. Nondistended. Active bowel sounds. Musculoskeletal:      He moves all of his extremities well. He can do simple motor skills, finger pointing, and making hand signs. Skin:  He has a healing curved scar over the left parietal area from above the ear curving around posteriorly and then upward and anterior to form a flap. But edges are well approximated there is some dry scale and scab attached. There is no surrounding erythema. Incision site looks to be healing well.      Neurological: His speech is a little thick due to cleft palate repair. His words are fairly understandable today. He does have frequent word finding issues. He is not able to initially name the town where he lives but eventually comes up with it. Similarly, he is unable to say the name of the state that his daughter lives in but he knows that it is [address]. No results displayed because visit has over 200 results. ASSESSMENT/PLAN:  1. Brain cancer. Left posterior temporal lobe grade 4 glioblastoma multiforme with complicating encephalopathy, cerebral edema, and questionable seizures. He has had resection as of December 14, 2020. Recommendations were made to follow up with radiation oncology an oncologist doctor Proctor as an outpatient to discuss on going follow up and additional treatment. Continue with speech therapy, occupational therapy, and physical therapy. 2. BPH with history of urinary obstruction. No active treatment at present. He denies any significant signs of retention or urinary obstruction. 3. Idiopathic peripheral neuropathy, most in the feet. No active treatment at present. He denies any significant pain or disability. 4. History of hypertension. He is currently on Coreg 3.125mg twice daily. Blood pressure under fair control. Continue current dosing. 5. GERD. No active treatment. He does have milk of magnesia as needed at present. 6. History of arthritis. Currently has no focal joint complaints. He is on no anti-inflammatory medication. He does have Tylenol for PRN use. 7. Hyperlipidemia. Currently on atorvastatin 40mg at bedtime. Plan biannual lab work to include lipid profile and liver profile. 8. History of impaired fasting blood sugar. No recent labs. Will likely recheck serially with his updated labs. 9. History of anxiety. Currently on venlafaxine daily. He seems to be doing well at present. Will continue current dose in the absence of side effects. 10. Thrombocytopenia. No evidence of bruising or bleeding. Currently have no recent lab work to compare to. Will update platelet count with next CBC. 11. History of Constipation. He currently is on scheduled Senokot with PRN to include Dulcolax and milk of magnesia. 12. Questionable seizures. He was started on Depakote at last hospitalization. No definitive seizure activity reported. He is unaware of any seizure activity. Remainder of chronic health conditions stable and unchanged  Plan as noted above. Will follow up for routine monthly rounds. They will call sooner with any concerns prior. Ce Vaca am personally transcribing for Jose Nielson MD 1/8/21 at 9:54 AM EST. Luis Lang MD, personally performed the services described in this document as transcribed by the , and it is both accurate and complete.     Electronically signed by Jose Nielson MD on 2/10/21 at 9:00 AM EST

## 2021-01-14 ENCOUNTER — HOSPITAL ENCOUNTER (OUTPATIENT)
Dept: MRI IMAGING | Age: 76
Discharge: HOME | End: 2021-01-16
Payer: MEDICARE

## 2021-01-14 DIAGNOSIS — C71.9 GBM (GLIOBLASTOMA MULTIFORME) (HCC): ICD-10-CM

## 2021-01-14 DIAGNOSIS — Z98.890 S/P CRANIOTOMY: ICD-10-CM

## 2021-01-14 PROCEDURE — 70553 MRI BRAIN STEM W/O & W/DYE: CPT

## 2021-01-14 PROCEDURE — A9579 GAD-BASE MR CONTRAST NOS,1ML: HCPCS | Performed by: NURSE PRACTITIONER

## 2021-01-14 PROCEDURE — 6360000004 HC RX CONTRAST MEDICATION: Performed by: NURSE PRACTITIONER

## 2021-01-14 RX ADMIN — GADOTERIDOL 15 ML: 279.3 INJECTION, SOLUTION INTRAVENOUS at 16:23

## 2021-01-15 ENCOUNTER — HOSPITAL ENCOUNTER (OUTPATIENT)
Dept: RADIATION ONCOLOGY | Age: 76
Discharge: HOME | End: 2021-01-15
Payer: MEDICARE

## 2021-01-15 ENCOUNTER — OFFICE VISIT (OUTPATIENT)
Dept: ONCOLOGY | Age: 76
End: 2021-01-15
Payer: MEDICARE

## 2021-01-15 ENCOUNTER — TELEPHONE (OUTPATIENT)
Dept: ONCOLOGY | Age: 76
End: 2021-01-15

## 2021-01-15 VITALS
SYSTOLIC BLOOD PRESSURE: 126 MMHG | HEART RATE: 78 BPM | DIASTOLIC BLOOD PRESSURE: 77 MMHG | BODY MASS INDEX: 24.27 KG/M2 | WEIGHT: 189 LBS | TEMPERATURE: 98 F | RESPIRATION RATE: 14 BRPM

## 2021-01-15 VITALS
HEART RATE: 78 BPM | OXYGEN SATURATION: 97 % | RESPIRATION RATE: 14 BRPM | BODY MASS INDEX: 24.27 KG/M2 | TEMPERATURE: 98 F | WEIGHT: 189 LBS | DIASTOLIC BLOOD PRESSURE: 77 MMHG | SYSTOLIC BLOOD PRESSURE: 126 MMHG

## 2021-01-15 DIAGNOSIS — C71.9 GBM (GLIOBLASTOMA MULTIFORME) (HCC): Primary | ICD-10-CM

## 2021-01-15 PROCEDURE — 1111F DSCHRG MED/CURRENT MED MERGE: CPT | Performed by: INTERNAL MEDICINE

## 2021-01-15 PROCEDURE — 99214 OFFICE O/P EST MOD 30 MIN: CPT | Performed by: INTERNAL MEDICINE

## 2021-01-15 PROCEDURE — G8482 FLU IMMUNIZE ORDER/ADMIN: HCPCS | Performed by: INTERNAL MEDICINE

## 2021-01-15 PROCEDURE — 99211 OFF/OP EST MAY X REQ PHY/QHP: CPT | Performed by: INTERNAL MEDICINE

## 2021-01-15 PROCEDURE — 1036F TOBACCO NON-USER: CPT | Performed by: INTERNAL MEDICINE

## 2021-01-15 PROCEDURE — 4040F PNEUMOC VAC/ADMIN/RCVD: CPT | Performed by: INTERNAL MEDICINE

## 2021-01-15 PROCEDURE — G8427 DOCREV CUR MEDS BY ELIG CLIN: HCPCS | Performed by: INTERNAL MEDICINE

## 2021-01-15 PROCEDURE — 99213 OFFICE O/P EST LOW 20 MIN: CPT | Performed by: RADIOLOGY

## 2021-01-15 PROCEDURE — G8420 CALC BMI NORM PARAMETERS: HCPCS | Performed by: INTERNAL MEDICINE

## 2021-01-15 PROCEDURE — 99204 OFFICE O/P NEW MOD 45 MIN: CPT | Performed by: RADIOLOGY

## 2021-01-15 PROCEDURE — 1123F ACP DISCUSS/DSCN MKR DOCD: CPT | Performed by: INTERNAL MEDICINE

## 2021-01-15 NOTE — PROGRESS NOTES
Chief Complaint   Patient presents with    Follow-up     review status of disease    Follow-Up from Hospital    Fatigue       DIAGNOSIS:        GBM     CURRENT THERAPY:         S/p resection 12/14/2020    BRIEF CASE HISTORY:      The patient is a 66 y.o.  male who is admitted to the hospital for acute encephalopathy and left brain mass. Patient presented with confusion and strokelike symptoms about a week ago. Found to have a left temporal lobe mass with cerebral edema and midline shift on brain imaging. He was managing neuro ICU and received steroids and antiseizure medications. He underwent craniotomy with resection of left temporal lobe tumor on 12/14/2020. Pathology report on sample obtained showed grade 4 glioblastoma multiforme. His past medical history includes essential hypertension and hyperlipidemia. He lost his wife to cancer. His 2 daughters are his designated health power of . Patient is pleasantly confused and is very hard of hearing. He appears to understand simple commands and conversations. He is currently on the neuro stepdown floor. INTERIM HISTORY:   The patient is seen for follow up brain tumor. Resected 12/14/2020. Pathology showed GBM. Doing well clinically. No headache. No dizziness. No seizures. Past Medical History:   has a past medical history of Aphasia, Arthritis, Benign prostatic hyperplasia with urinary obstruction, Essential hypertension, Gastroesophageal reflux disease without esophagitis, Muscle weakness (generalized), Other hyperlipidemia, Peripheral neuropathy, and Unsteadiness on feet. Past Surgical History:   has a past surgical history that includes craniotomy (Left, 12/14/2020) and craniotomy (Left, 12/14/2020). Medications:    Reviewed in Epic     Allergies:  Patient has no known allergies. Social History:   reports that he quit smoking about 60 years ago.  He has never used smokeless tobacco.     Family History: family Date/Time     12/28/2020 0512    K 4.2 12/28/2020 0512     12/28/2020 0512    CO2 24 12/28/2020 0512    BUN 16 12/28/2020 0512    CREATININE 0.91 12/28/2020 0512        Component Value Date/Time    CALCIUM 8.5 (L) 12/28/2020 0512    ALKPHOS 50 12/19/2020 1322    AST 25 12/19/2020 1322    ALT 88 (H) 12/19/2020 1322    BILITOT 0.53 12/19/2020 1322            IMPRESSION:   1. Glioblastoma multiforme. High-grade. 2. Cerebral edema. 3. Essential hypertension. 4. Mild thrombocytopenia. RECOMMENDATIONS:  Records, labs and imaging reviewed. Explained the nature of this cancer and treatment plans. Explained the treatment options. We recommend chemoradiation with the use  Of Temodar followed by maintenance Temodar treatment. Patient declines treatment. Family will have meeting with patient and will decide. RV PRN. [address] [facility]                            This note is created with the assistance of a speech recognition program.  While intending to generate a document that actually reflects the content of the visit, the document can still have some errors including those of syntax and sound a like substitutions which may escape proof reading. It such instances, actual meaning can be extrapolated by contextual diversion.

## 2021-01-15 NOTE — TELEPHONE ENCOUNTER
AVS from 1/15/21    RV PRN    PT will contact to schedule as needed    PT was given AVS     Electronically signed by Dylan Turner on 1/15/2021 at 11:49 AM

## 2021-01-15 NOTE — PROGRESS NOTES
Referring Physician: Dr Pastor Aldridge:    01/15/21 0800   BP: 126/77   Pulse: 78   Resp: 14   Temp: 98 °F (36.7 °C)   SpO2: 97%    :  Patient Currently in Pain: Denies             Wt Readings from Last 1 Encounters:   01/15/21 189 lb (85.7 kg)        Body mass index is 24.27 kg/m². Immunizations:    Influenza status:    [x]   Current   []   Patient declined    Pneumococcal status:  [x]   Current  []   Patient declined    Smoking Status:    [] Smoker - PPD:   [x] Nonsmoker - Quit Date: 63              [] Never a smoker      No chief complaint on file. Cancer Staging  No matching staging information was found for the patient. Prior Radiation Therapy? No   If yes, site treated:   Facility:                             Date:    Concurrent Chemo/radiation? No   If yes, start date:    Prior Chemotherapy? No   If yes    Facility:                             Date:    Prior Hormonal Therapy? No   If yes   Facility:                             Date:    Head and Neck Cancer? No   If yes, please remind physician to place swallow study order and speech therapy order.       Pacemaker/Defibulator/ICD:  No             BREAST/GYN  Patient only:     LMP:    Age at first Menses:    Para:    :    Cup size:    Lymphedema Evaluation[de-identified]   [] left arm      [] right arm  Location:     Measurement (cm)    Upper Bicep :    Lower Bicep :           Current Outpatient Medications   Medication Sig Dispense Refill    senna (SENOKOT) 8.6 MG tablet Take 1 tablet by mouth 2 times daily      acetaminophen (TYLENOL) 325 MG tablet Take 650 mg by mouth every 4 hours as needed for Pain      magnesium hydroxide (MILK OF MAGNESIA) 400 MG/5ML suspension Take 30 mLs by mouth as needed for Constipation      bisacodyl (DULCOLAX) 10 MG suppository Place 10 mg rectally as needed for Constipation      carbamide peroxide (DEBROX) 6.5 % otic solution Place 5 drops into both ears as needed  divalproex (DEPAKOTE ER) 500 MG extended release tablet Take 1 tablet by mouth 2 times daily 30 tablet 3    venlafaxine (EFFEXOR) 37.5 MG tablet Take 1 tablet by mouth daily 90 tablet 3    atorvastatin (LIPITOR) 40 MG tablet Take 1 tablet by mouth nightly 30 tablet 3    carvedilol (COREG) 3.125 MG tablet Take 1 tablet by mouth 2 times daily (with meals) 60 tablet 3     No current facility-administered medications for this encounter. Past Medical History:   Diagnosis Date    Aphasia 2021    Arthritis 2021    Benign prostatic hyperplasia with urinary obstruction 2021    Essential hypertension 2021    Gastroesophageal reflux disease without esophagitis 2021    Muscle weakness (generalized) 2021    Other hyperlipidemia 2021    Peripheral neuropathy 2021    Unsteadiness on feet 2021       Past Surgical History:   Procedure Laterality Date    CRANIOTOMY Left 2020    temporal for tumor    CRANIOTOMY Left 2020    TEMPORAL CRANIOTOMY FOR TUMOR, IMAGE GUIDED, AWAKE performed by Lino Childress DO at [place] [address]       No family history on file. Social History     Socioeconomic History    Marital status:       Spouse name: Not on file    Number of children: Not on file    Years of education: Not on file    Highest education level: Not on file   Occupational History    Not on file   Social Needs    Financial resource strain: Not on file    Food insecurity     Worry: Not on file     Inability: Not on file    Transportation needs     Medical: Not on file     Non-medical: Not on file   Tobacco Use    Smoking status: Former Smoker     Quit date:      Years since quittin.0    Smokeless tobacco: Never Used   Substance and Sexual Activity    Alcohol use: Not on file     Comment: SAUNDRA     Drug use: Not on file    Sexual activity: Not on file     Comment: SAUNDRA   Lifestyle    Physical activity     Days per week: Not on file Minutes per session: Not on file    Stress: Not on file   Relationships    Social connections     Talks on phone: Not on file     Gets together: Not on file     Attends [employer] service: Not on file     Active member of club or organization: Not on file     Attends meetings of clubs or organizations: Not on file     Relationship status: Not on file    Intimate partner violence     Fear of current or ex partner: Not on file     Emotionally abused: Not on file     Physically abused: Not on file     Forced sexual activity: Not on file   Other Topics Concern    Not on file   Social History Narrative    Not on file             FALLS RISK SCREEN  Instructions:  Assess the patient and enter the appropriate indicators that are present for fall risk identification. Total the numbers entered and assign a fall risk score from Table 2.  Reassess patient at a minimum every 12 weeks or with status change. Assessment   Date  1/15/2021     1. Mental Ability: confusion/cognitively impaired 3     2. Elimination Issues: incontinence, frequency 0       3. Ambulatory: use of assistive devices (walker, cane, off-loading devices),        attached to equipment (IV pole, oxygen) 2     4. Sensory Limitations: dizziness, vertigo, impaired vision 0     5. Age less than 65        0     6. Age 72 or greater 1     7. Medication: diuretics, strong analgesics, hypnotics, sedatives,        antihypertensive agents 3   8. Falls:  recent history of falls within the last 3 months (not to include slipping or        tripping) 0   TOTAL 9    If score of 4 or greater was education given? Yes       TABLE 2   Risk Score Risk Level Plan of Care   0-3 Little or  No Risk 1. Provide assistance as indicated for ambulation activities  2. Reorient confused/cognitively impaired patient  3. Call-light/bell within patient's reach  4.   Chair/bed in low position, stretcher/bed with siderails up except when performing patient care activities 5.  Educate patient/family/caregiver on falls prevention  6.  Reassess in 12 weeks or with any noted change in patient condition which places them at a risk for a fall   4-6 Moderate Risk 1. Provide assistance as indicated for ambulation activities  2. Reorient confused/cognitively impaired patient  3. Call-light/bell within patient's reach  4. Chair/bed in low position, stretcher/bed with siderails up except when performing patient care activities  5. Educate patient/family/caregiver on falls prevention     7 or   Higher High Risk 1. Place patient in easily observable treatment room  2. Patient attended at all times by family member or staff  3. Provide assistance as indicated for ambulation activities  4. Reorient confused/cognitively impaired patient  5. Call-light/bell within patient's reach  6. Chair/bed in low position, stretcher/bed with siderails up except when performing patient care activities  7. Educate patient/family/caregiver on falls prevention             Assessment/Plan: Patient was seen today for consultation. Pt is pleasantly confused, alert to only self. He presents with his daughter, Jada Lee. Pt denies pain headaches at this time. Dr Britany Kovacs notified and examined pt. Pt and pt's daughter are not sure what they would like to do for treatment. Placed on pending list to f/u with final decision.          Dalton Chavez 1/15/2021 8:15 AM

## 2021-01-16 NOTE — CONSULTS
[address]            Radiation Oncology          [address] [facility] [address]        [place]: [phone]        F: [phone]       [employer]           1/15/2021    Patient: Arley Plata   YOB: 1945       Dear Dr Cosmo Arteaga: Thank you for referring Arley Plata to me for evaluation. Below are the relevant portions of my assessment and plan of care. If you have questions, please do not hesitate to call me. I look forward to following this patient along with you.      Sincerely,    Electronically signed by Georgina Salter MD on 1/15/21 at 11:41 PM EST    CC: Patient Care Team:  Sophia Britt MD as PCP - General (Family Medicine)  Linda Reyes MD as Consulting Physician (Oncology)  Oscar Harry MD as Resident (Neurology)  Melisa Mtz RN as Nurse Navigator (Oncology)  ------------------------------------------------------------------------------------------------------------------------------------------------------------------------------------------      RADIATION ONCOLOGY NEW PATIENT VISIT:    Date of Service: 1/15/2021    Location:  [facility] Radiation Oncology,   [address] [place] [address]   [phone]     Patient ID:   Arley Plata  : 1945   MRN: [id number]    CHIEF COMPLAINT: \"Brain tumor\"    DIAGNOSIS:  Cancer Staging  GBM (glioblastoma multiforme) ([facility] [address]  Staging form: Central Nervous System Tumors  - Clinical stage from 2020: No stage assigned - Signed by Georgina Salter MD on 1/15/2021  -s/p resection 20    HISTORY OF PRESENT ILLNESS: Mr Keeley Lopez is a 66year old gentleman who presented to the ED with AMS and impaired speech, and was found on CT to head a tumor in the left temporal lobe on 12/9/20. He had a MRI Brain that confirmed a ring enhancing lesion. He had a CT of the chest abdomen and pelvis that was negative for other sites of disease. Patient underwent a craniotomy on 12/14/20 that revealed a glioblastoma WHO grade 4. He post-operatively was doing well and recovering his speech. He is currently at a nursing home. His daughter does note that he is having more symptoms of confusion, and difficulty with word finding now. He did have a post op MRI on 12/15/20 that showed questionable enhancement, however his repeat MRI on 1/14/21 showed evidence of recurrent disease. Patient is currently not on Decadron. The family does express that patient initially has no intention of pursuing treatments after surgery. His daughter is his POA and they would like to discuss options but are unsure of plans of treatment. He does have vision changes, but denies having any headaches, chest pain, SOB, abdominal pain, N/V, leg pain, or swelling. He does have weakness in his feet and trouble lifting them with greater weakness L>R.     PRIOR RADIATION HISTORY:  No prior history of radiation therapy    PACEMAKER: None    PAST MEDICAL HISTORY:  Past Medical History:   Diagnosis Date    Aphasia 1/5/2021    Arthritis 1/5/2021    Benign prostatic hyperplasia with urinary obstruction 1/5/2021    Essential hypertension 1/5/2021    Gastroesophageal reflux disease without esophagitis 1/5/2021    Muscle weakness (generalized) 1/5/2021    Other hyperlipidemia 1/5/2021    Peripheral neuropathy 1/5/2021    Unsteadiness on feet 1/5/2021       PAST SURGICAL HISTORY:  Past Surgical History:   Procedure Laterality Date    CRANIOTOMY Left 12/14/2020    temporal for tumor    CRANIOTOMY Left 12/14/2020 TEMPORAL CRANIOTOMY FOR TUMOR, IMAGE GUIDED, AWAKE performed by Laverne Sloan DO at [address]:    Current Outpatient Medications:     senna (SENOKOT) 8.6 MG tablet, Take 1 tablet by mouth 2 times daily, Disp: , Rfl:     acetaminophen (TYLENOL) 325 MG tablet, Take 650 mg by mouth every 4 hours as needed for Pain, Disp: , Rfl:     magnesium hydroxide (MILK OF MAGNESIA) 400 MG/5ML suspension, Take 30 mLs by mouth as needed for Constipation, Disp: , Rfl:     bisacodyl (DULCOLAX) 10 MG suppository, Place 10 mg rectally as needed for Constipation, Disp: , Rfl:     carbamide peroxide (DEBROX) 6.5 % otic solution, Place 5 drops into both ears as needed, Disp: , Rfl:     divalproex (DEPAKOTE ER) 500 MG extended release tablet, Take 1 tablet by mouth 2 times daily, Disp: 30 tablet, Rfl: 3    venlafaxine (EFFEXOR) 37.5 MG tablet, Take 1 tablet by mouth daily, Disp: 90 tablet, Rfl: 3    atorvastatin (LIPITOR) 40 MG tablet, Take 1 tablet by mouth nightly, Disp: 30 tablet, Rfl: 3    carvedilol (COREG) 3.125 MG tablet, Take 1 tablet by mouth 2 times daily (with meals), Disp: 60 tablet, Rfl: 3    ALLERGIES:   No Known Allergies    SOCIAL HISTORY:  Social History     Socioeconomic History    Marital status:       Spouse name: Not on file    Number of children: Not on file    Years of education: Not on file    Highest education level: Not on file   Occupational History    Not on file   Social Needs    Financial resource strain: Not on file    Food insecurity     Worry: Not on file     Inability: Not on file    Transportation needs     Medical: Not on file     Non-medical: Not on file   Tobacco Use    Smoking status: Former Smoker     Quit date:      Years since quittin.0    Smokeless tobacco: Never Used   Substance and Sexual Activity    Alcohol use: Not on file     Comment: SAUNDRA     Drug use: Not on file    Sexual activity: Not on file     Comment: SAUNDRA   Lifestyle  Physical activity     Days per week: Not on file     Minutes per session: Not on file    Stress: Not on file   Relationships    Social connections     Talks on phone: Not on file     Gets together: Not on file     Attends Anabaptist service: Not on file     Active member of club or organization: Not on file     Attends meetings of clubs or organizations: Not on file     Relationship status: Not on file    Intimate partner violence     Fear of current or ex partner: Not on file     Emotionally abused: Not on file     Physically abused: Not on file     Forced sexual activity: Not on file   Other Topics Concern    Not on file   Social History Narrative    Not on file       FAMILY HISTORY:  No family history on file. REVIEW OF SYSTEMS:    GENERAL/CONSTITUTIONAL: The patient denies fever, fatigue, weight gain or weight loss. HEENT: The patient denies pain, redness, loss of vision, double or blurred vision. The patient denies ringing in the ears, loss of hearing, hoarseness, trouble swallowing, or painful swallowing. CARDIOVASCULAR: The patient denies chest pain, irregular heartbeats, sudden changes in heartbeat or palpitation. RESPIRATORY: The patient denies shortness of breath, cough, hemoptysis. GASTROINTESTINAL: The patient denies nausea, vomiting, diarrhea, constipation, blood in the stools. GENITOURINARY: The patient denies difficult urination, pain or burning with urination, blood in the urine. MUSCULOSKELETAL: (+) Weakness in feet L>R. The patient denies arm, buttock, thigh or calf cramps. No joint or muscle pain. SKIN: The patient denies easy bruising, skin redness, skin rash, hives. NEUROLOGIC: (+) Confusion, decrease strength in lower extremities. The patient denies headache, dizziness, fainting, muscle spasm, loss of consciousness. ENDOCRINE: The patient denies intolerance to hot or cold temperature, flushing. HEMATOLOGIC/LYMPHATIC: The patient denies anemia, bleeding tendency or clotting tendency. ALLERGIC/IMMUNOLOGIC: The patient denies rhinitis, asthma, skin sensitivity. PYSCHOLOGIC: The patient denies any depression, anxiety, or confusion. A full 14 point review of systems was performed and assessed and found to be negative except as noted above. PHYSICAL EXAMINATION:    CHAPERONE: Family/friend/companieon Present    ECO Symptomatic, <50% in bed during the day    VITAL SIGNS: /77   Pulse 78   Temp 98 °F (36.7 °C)   Resp 14   Wt 189 lb (85.7 kg)   SpO2 97%   BMI 24.27 kg/m²   GENERAL:  General appearance is that of an elderly male in wheelchair, well-nourished, well-developed in no apparent distress. HEENT: Normocephalic, atraumatic, EOMI, moist mucosa, no erythema, (+) healing scar on L scalp. NECK:  No adenopathy or a palpable thyroid mass, trachea is midline. LYMPHATICS: No cervical, supraclavicular, or axillary adenopathy. HEART:  Regular rate and rhythm, S1, S2, no murmurs. LUNGS:  Clear to auscultation bilaterally with no wheezing or crackles. ABDOMEN:  Soft, nontender, non distended, and no hepatosplenomegaly. EXTREMITIES:  No clubbing, cyanosis, or edema. No calf tenderness. MSK:  No CVA or spinal tenderness. NEUROLOGICAL: Alert but oriented to self and place. CN II-XII grossly intact, blurred vision bilaterally. Sensation intact bilaterally, strength decrease in both feet. SKIN: No erythema or desquamation.         LABS:  WBC   Date Value Ref Range Status   2020 4.7 3.5 - 11.3 k/uL Final     Segs Absolute   Date Value Ref Range Status   12/10/2020 3.58 1.50 - 8.10 k/uL Final     Hemoglobin   Date Value Ref Range Status   2020 14.0 13.0 - 17.0 g/dL Final     Platelets   Date Value Ref Range Status   2020 See Reflexed IPF Result 138 - 453 k/uL Final       IMAGIN/9/20 CT Head  Impression 1. Noncontrast CT of the head demonstrates presence of a 3 cm left posterior   temporal lobe mass with surrounding vasogenic edema.  Minimal midline shift   from left to right is noted.  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 MRI of the brain with contrast is recommended. 2. Unremarkable CTA of the head and neck. 12/9/20 MRI Brain  Impression   Ring-enhancing left temporal lobe mass consistent with abscess versus primary   or secondary neoplasm.  Local edema and slight midline shift.  Mixed   cytotoxic and vasogenic edema.  Microscopic hemorrhage. 12/15/20 MRI Brain  Impression   Expected postoperative findings status left temporal craniotomy with T2   prolongation identified within left temporal lobe extending along splenium   corpus callosum worrisome for possibility of underlying infiltrative tumor. Additionally there is evidence of questionable subependymal enhancement which   could be related to recent surgery/postoperative proteinaceous debris/blood   products versus early subependymal spread of tumor.       There is evidence of intrinsic T1 shortening identified on operative bed   consistent with postop blood and proteinaceous debris postoperative changes   without convincing evidence of enhancing nodularity within the resection   cavity.       Postoperative extra-axial fluid collection identified on left.       7 mm of left-to-right midline shift.       Continued surveillance is recommended given the above findings. 1/14/21 MRI Brain  Impression   1. Postsurgical changes from left lateral craniotomy for tumor resection. Thick enhancing soft tissue along the margins of the resection cavity most   worrisome for recurrent tumor.  Additionally, there is surrounding signal   abnormality in the left temporal lobe, basal ganglia, and left thalamus   suspicious for nonenhancing infiltrative tumor.    2. Enhancement abuts  the ependyma of the left ventricular trigone suspicious for ependymal tumor infiltration.  Alternatively, this could represent   postsurgical granulation tissue. 3. Extra-axial fluid along the left frontal lobe measures up to 1 cm in   thickness.  Rightward shift of midline structures by 0.5 cm. PATHOLOGY:  12/14/20 Craniotomy  -- Diagnosis --   1-5.  LEFT TEMPORAL LOBE BRAIN MASS, BIOPSIES AND RESECTION:      -   GLIOBLASTOMA, WHO GRADE 4.      ASSESSMENT AND PLAN:   Yvonne Hart is a 66 y.o. male with a Cancer Staging  GBM (glioblastoma multiforme) ([facility] [address]  Staging form: Central Nervous System Tumors  - Clinical stage from 12/14/2020: No stage assigned - Signed by Michelle Whyte MD on 1/15/2021    -s/p resection 12/14/20     We reviewed with the patient and family the testing that has been done so far, including imaging and pathology results, and the symptoms of confusion. We also reviewed their staging based on the testing that as been done and the recommendations per the NCCN guidelines. We discussed the options of treatment, including surgery, chemotherapy, and radiation, and the rationale of why radiation therapy would be indicated for this diagnosis. We also discussed that they have the option to not pursue our recommended treatment as well. We reviewed his recent MRI from yesterday that shows evidence of progression of his disease. This coincides with his recent worsening of symptoms. However the patient had expressed to his daughter who is POA that he did not want aggressive treatments prior to his acute symptom exacerbation. However I did discuss and offer the option of a shorten hypofractionated course that could provide disease and symptom control. We reviewed the logistics of treatment planning, including a CT Simulation session, as well as daily treatments for approximately 1-3 weeks. With regards to radiation to the brain, I discussed the possible short-term side effects of brain radiation which included skin irritation (causing redness, dryness, or peeling), tiredness, low blood counts (causing infection or bleeding), hair loss, headache, and nausea/vomiting. Possible long-term side effects discussed included hyperpigmentation of the skin, permanent hair loss or change in color and texture of the hair if it does regrow, damage to the normal brain resulting in decreased mentation, numbness and weakness, cataract formation and decreased pituitary function. After ample time to review the patient's questions, patient and his family was provided with our contact information and told to notify us if they decide to pursue treatment. We will prescribe Decadron 8mg BID to be taken at the nursing home to help with potential symptoms from the tumor causing any edema. Patient was in agreement with my recommendations. All questions were answered to their satisfaction. Patient was advised to contact us anytime should they have any questions or concerns. I spent greater than 60 minutes counseling and evaluating the different treatment options available to the patient and formulating a plan of action today. Duane Lor MD  Electronically signed by Duane Lor, MD on 1/15/21 at 11:41 PM EST      Drugs Prescribed:  New Prescriptions    No medications on file       Orders Placed:   No orders of the defined types were placed in this encounter.         CC:  Patient Care Team:  Gifty Drake MD as PCP - General (Family Medicine)  Sadiq Rodas MD as Consulting Physician (Oncology)  Fred Erickson MD as Resident (Neurology)  Arley Albert RN as Nurse Navigator (Oncology)

## 2021-01-22 ENCOUNTER — TELEPHONE (OUTPATIENT)
Dept: RADIATION ONCOLOGY | Age: 76
End: 2021-01-22

## 2021-01-22 ENCOUNTER — TELEPHONE (OUTPATIENT)
Dept: ONCOLOGY | Age: 76
End: 2021-01-22

## 2021-01-22 NOTE — TELEPHONE ENCOUNTER
Writer spoke with pt's daughter, Tatyana Ricks, who reports pt was placed under Hospice care on Monday and will not be having  treatment.

## 2021-01-22 NOTE — TELEPHONE ENCOUNTER
Name: Liliana Burr  : 1945  MRN: [id number]    Oncology Navigation Follow-Up Note    Contact Type:  Telephone  Notes: Jami Rock, [facility] RO, alerted writer pt receiving hospice care. Navigation dc'd.     Electronically signed by Kat Zavala RN on 2021 at 11:27 AM

## 2021-02-02 ENCOUNTER — TELEPHONE (OUTPATIENT)
Dept: FAMILY MEDICINE CLINIC | Age: 76
End: 2021-02-02
Payer: MEDICARE

## 2021-02-02 DIAGNOSIS — R47.01 APHASIA: ICD-10-CM

## 2021-02-02 DIAGNOSIS — I10 ESSENTIAL HYPERTENSION, MALIGNANT: ICD-10-CM

## 2021-02-02 DIAGNOSIS — C71.2 MALIGNANT NEOPLASM OF TEMPORAL LOBE OF BRAIN (HCC): Primary | ICD-10-CM

## 2021-02-02 DIAGNOSIS — G93.40 ENCEPHALOPATHY, UNSPECIFIED: ICD-10-CM

## 2021-02-02 DIAGNOSIS — D69.6 THROMBOCYTOPENIA, UNSPECIFIED (HCC): ICD-10-CM

## 2021-02-02 DIAGNOSIS — G93.6 CEREBRAL EDEMA (HCC): ICD-10-CM

## 2021-02-02 DIAGNOSIS — G62.0 POLYNEUROPATHY DUE TO DRUG (HCC): ICD-10-CM

## 2021-02-02 PROCEDURE — G0180 MD CERTIFICATION HHA PATIENT: HCPCS | Performed by: FAMILY MEDICINE

## (undated) DEVICE — BLADE ES ELASTOMERIC COAT INSUL DURABLE BEND UPTO 90DEG

## (undated) DEVICE — E-Z CLEAN, NON-STICK, PTFE COATED, ELECTROSURGICAL BLADE ELECTRODE, MODIFIED EXTENDED INSULATION, 2.5 INCH (6.35 CM): Brand: MEGADYNE

## (undated) DEVICE — SVMMC CRANI PK

## (undated) DEVICE — DRESSING,GAUZE,XEROFORM,CURAD,1"X8",ST: Brand: CURAD

## (undated) DEVICE — COVER US PRB W15XL120CM W/ GEL RUBBERBAND TAPE STRP FLD GEN

## (undated) DEVICE — SEALANT TISS 4 CC FIBRIN VISTASEAL

## (undated) DEVICE — CLAMP SCALP STR EDGE HVY

## (undated) DEVICE — DRESSING TRNSPAR W5XL4.5IN FLM SHT SEMIPERMEABLE WIND

## (undated) DEVICE — SUTURE D SPEC VCRL 2 0 D8876

## (undated) DEVICE — 3M™ IOBAN™ 2 ANTIMICROBIAL INCISE DRAPE 6650EZ: Brand: IOBAN™ 2

## (undated) DEVICE — LARGE BLUNT, DUAL PACK: Brand: LINA SKIN HOOK™

## (undated) DEVICE — INTENDED FOR TISSUE SEPARATION, AND OTHER PROCEDURES THAT REQUIRE A SHARP SURGICAL BLADE TO PUNCTURE OR CUT.: Brand: BARD-PARKER ® CARBON RIB-BACK BLADES

## (undated) DEVICE — 1000 S-DRAPE TOWEL DRAPE 10/BX: Brand: STERI-DRAPE™

## (undated) DEVICE — AGENT HEMSTAT W2XL14IN OXIDIZED REGENERATED CELOS ABSRB FOR

## (undated) DEVICE — GAUZE,SPONGE,FLUFF,6"X6.75",STRL,5/TRAY: Brand: MEDLINE

## (undated) DEVICE — AGENT HEMSTAT W2XL4IN OXIDIZED REGENERATED CELOS ABSRB

## (undated) DEVICE — PAD,NON-ADHERENT,3X8,STERILE,LF,1/PK: Brand: MEDLINE

## (undated) DEVICE — CONTAINER,SPECIMEN,4OZ,OR STRL: Brand: MEDLINE

## (undated) DEVICE — SYRINGE MED 10ML TRNSLUC BRL PLUNG BLK MRK POLYPR CTRL

## (undated) DEVICE — MARKER,SKIN,WI/RULER AND LABELS: Brand: MEDLINE

## (undated) DEVICE — ADHESIVE SKIN CLSR 0.7ML TOP DERMBND ADV

## (undated) DEVICE — SPONGE,NEURO,0.5"X0.5",XR,STRL,10/PK: Brand: MEDLINE

## (undated) DEVICE — DRESSING BORDERED ADH GZ UNIV GEN USE 8INX4IN AND 6INX2IN

## (undated) DEVICE — CONNECTOR TBNG WHT PLAS SUCT STR 5IN1 LTWT W/ M CONN

## (undated) DEVICE — CRANIOTOMY DRAPE, STERILE: Brand: MEDLINE

## (undated) DEVICE — ZYPHR DISPOSABLE CRANIAL PERFORATOR, LARGE 14/11MM: Brand: ZYPHR

## (undated) DEVICE — BATTERY PACK 2 FOR QUIKDRIVE: Brand: UNIVERSAL NEURO 2, QUIKDRIVE

## (undated) DEVICE — DRAPE SLUSH DISC W44XL66IN ST FOR RND BSIN HUSH SLUSH SYS

## (undated) DEVICE — AGENT HEMOSTATIC SURGIFLOW MATRIX KIT W/THROMBIN

## (undated) DEVICE — GOWN,AURORA,NONREINFORCED,LARGE: Brand: MEDLINE

## (undated) DEVICE — Device: Brand: SNAP ON SPHERZ

## (undated) DEVICE — DISPOSABLE IRRIGATION BIPOLAR CORD, M1000 TYPE: Brand: KIRWAN

## (undated) DEVICE — GLOVE ORANGE PI 7 1/2   MSG9075

## (undated) DEVICE — 3.0MM PRECISION NEURO (MATCH HEAD)

## (undated) DEVICE — 2.3MM TAPERED ROUTER

## (undated) DEVICE — SPONGE LAP W18XL18IN WHT COT 4 PLY FLD STRUNG RADPQ DISP ST

## (undated) DEVICE — DISPOSABLE BIPOLAR FORCEPS 7" (17.8CM) COHEN BAYONET, INSULATED, IRRIGATING, 1.5MM TIP: Brand: KIRWAN

## (undated) DEVICE — BLADE CLP TAPR HD WET DRY CAPABILITY GTT IN CHARGING USE

## (undated) DEVICE — APPLICATOR MEDICATED 10.5 CC SOLUTION HI LT ORNG CHLORAPREP

## (undated) DEVICE — Device

## (undated) DEVICE — TOTAL TRAY, 16FR 10ML SIL FOLEY, URN: Brand: MEDLINE

## (undated) DEVICE — GOWN,AURORA,NONRNF,XL,30/CS: Brand: MEDLINE

## (undated) DEVICE — NEEDLE HYPO 25GA L1.5IN BLU POLYPR HUB S STL REG BVL STR

## (undated) DEVICE — DRAPE,REIN 53X77,STERILE: Brand: MEDLINE

## (undated) DEVICE — TUBING, SUCTION, 9/32" X 20', STRAIGHT: Brand: MEDLINE INDUSTRIES, INC.

## (undated) DEVICE — DRAPE MICSCP W117XL305CM DIA65MM LENS W VARI LENS2 FOR LEICA

## (undated) DEVICE — DRESSING BORDERED ADH GZ UNIV GEN USE 5IN 4IN AND 2 1/2IN

## (undated) DEVICE — ELECTRODE ES L2.75IN S STL INSUL BLDE W/ SL EDGE

## (undated) DEVICE — GOWN,SURGICAL,AURORA,SLEEVE: Brand: MEDLINE